# Patient Record
Sex: FEMALE | Race: BLACK OR AFRICAN AMERICAN | NOT HISPANIC OR LATINO | Employment: OTHER | URBAN - METROPOLITAN AREA
[De-identification: names, ages, dates, MRNs, and addresses within clinical notes are randomized per-mention and may not be internally consistent; named-entity substitution may affect disease eponyms.]

---

## 2017-02-06 ENCOUNTER — HOSPITAL ENCOUNTER (EMERGENCY)
Facility: MEDICAL CENTER | Age: 82
End: 2017-02-07
Attending: EMERGENCY MEDICINE
Payer: MEDICARE

## 2017-02-06 DIAGNOSIS — R55 NEAR SYNCOPE: ICD-10-CM

## 2017-02-06 LAB
ALBUMIN SERPL BCP-MCNC: 3.9 G/DL (ref 3.2–4.9)
ALBUMIN/GLOB SERPL: 1.5 G/DL
ALP SERPL-CCNC: 99 U/L (ref 30–99)
ALT SERPL-CCNC: 13 U/L (ref 2–50)
ANION GAP SERPL CALC-SCNC: 4 MMOL/L (ref 0–11.9)
APPEARANCE UR: ABNORMAL
APTT PPP: 28.2 SEC (ref 24.7–36)
AST SERPL-CCNC: 25 U/L (ref 12–45)
BACTERIA #/AREA URNS HPF: ABNORMAL /HPF
BASOPHILS # BLD AUTO: 0.6 % (ref 0–1.8)
BASOPHILS # BLD: 0.03 K/UL (ref 0–0.12)
BILIRUB SERPL-MCNC: 0.7 MG/DL (ref 0.1–1.5)
BILIRUB UR QL STRIP.AUTO: NEGATIVE
BUN SERPL-MCNC: 14 MG/DL (ref 8–22)
CALCIUM SERPL-MCNC: 9.6 MG/DL (ref 8.5–10.5)
CHLORIDE SERPL-SCNC: 97 MMOL/L (ref 96–112)
CO2 SERPL-SCNC: 34 MMOL/L (ref 20–33)
COLOR UR: YELLOW
CREAT SERPL-MCNC: 3.1 MG/DL (ref 0.5–1.4)
CULTURE IF INDICATED INDCX: YES UA CULTURE
EKG IMPRESSION: NORMAL
EOSINOPHIL # BLD AUTO: 0.06 K/UL (ref 0–0.51)
EOSINOPHIL NFR BLD: 1.1 % (ref 0–6.9)
EPI CELLS #/AREA URNS HPF: ABNORMAL /HPF
ERYTHROCYTE [DISTWIDTH] IN BLOOD BY AUTOMATED COUNT: 57.9 FL (ref 35.9–50)
GFR SERPL CREATININE-BSD FRML MDRD: 14 ML/MIN/1.73 M 2
GLOBULIN SER CALC-MCNC: 2.6 G/DL (ref 1.9–3.5)
GLUCOSE SERPL-MCNC: 86 MG/DL (ref 65–99)
GLUCOSE UR STRIP.AUTO-MCNC: NEGATIVE MG/DL
HCT VFR BLD AUTO: 32.5 % (ref 37–47)
HGB BLD-MCNC: 10.2 G/DL (ref 12–16)
IMM GRANULOCYTES # BLD AUTO: 0.02 K/UL (ref 0–0.11)
IMM GRANULOCYTES NFR BLD AUTO: 0.4 % (ref 0–0.9)
INR PPP: 1.12 (ref 0.87–1.13)
KETONES UR STRIP.AUTO-MCNC: NEGATIVE MG/DL
LEUKOCYTE ESTERASE UR QL STRIP.AUTO: ABNORMAL
LYMPHOCYTES # BLD AUTO: 1.05 K/UL (ref 1–4.8)
LYMPHOCYTES NFR BLD: 19.6 % (ref 22–41)
MCH RBC QN AUTO: 31.7 PG (ref 27–33)
MCHC RBC AUTO-ENTMCNC: 31.4 G/DL (ref 33.6–35)
MCV RBC AUTO: 100.9 FL (ref 81.4–97.8)
MICRO URNS: ABNORMAL
MONOCYTES # BLD AUTO: 0.64 K/UL (ref 0–0.85)
MONOCYTES NFR BLD AUTO: 12 % (ref 0–13.4)
MUCOUS THREADS #/AREA URNS HPF: ABNORMAL /HPF
NEUTROPHILS # BLD AUTO: 3.55 K/UL (ref 2–7.15)
NEUTROPHILS NFR BLD: 66.3 % (ref 44–72)
NITRITE UR QL STRIP.AUTO: NEGATIVE
NRBC # BLD AUTO: 0 K/UL
NRBC BLD AUTO-RTO: 0 /100 WBC
PH UR STRIP.AUTO: 8.5 [PH]
PLATELET # BLD AUTO: 107 K/UL (ref 164–446)
PMV BLD AUTO: 12 FL (ref 9–12.9)
POTASSIUM SERPL-SCNC: 4.9 MMOL/L (ref 3.6–5.5)
PROT SERPL-MCNC: 6.5 G/DL (ref 6–8.2)
PROT UR QL STRIP: 200 MG/DL
PROTHROMBIN TIME: 14.8 SEC (ref 12–14.6)
RBC # BLD AUTO: 3.22 M/UL (ref 4.2–5.4)
RBC # URNS HPF: ABNORMAL /HPF
RBC UR QL AUTO: ABNORMAL
SODIUM SERPL-SCNC: 135 MMOL/L (ref 135–145)
SP GR UR STRIP.AUTO: 1.01
TROPONIN I SERPL-MCNC: 0.04 NG/ML (ref 0–0.04)
WBC # BLD AUTO: 5.4 K/UL (ref 4.8–10.8)
WBC #/AREA URNS HPF: ABNORMAL /HPF

## 2017-02-06 PROCEDURE — 700111 HCHG RX REV CODE 636 W/ 250 OVERRIDE (IP): Performed by: EMERGENCY MEDICINE

## 2017-02-06 PROCEDURE — 36415 COLL VENOUS BLD VENIPUNCTURE: CPT

## 2017-02-06 PROCEDURE — 81001 URINALYSIS AUTO W/SCOPE: CPT

## 2017-02-06 PROCEDURE — 80053 COMPREHEN METABOLIC PANEL: CPT

## 2017-02-06 PROCEDURE — 93005 ELECTROCARDIOGRAM TRACING: CPT | Performed by: EMERGENCY MEDICINE

## 2017-02-06 PROCEDURE — 85025 COMPLETE CBC W/AUTO DIFF WBC: CPT

## 2017-02-06 PROCEDURE — 304562 HCHG STAT O2 MASK/CANNULA

## 2017-02-06 PROCEDURE — 85730 THROMBOPLASTIN TIME PARTIAL: CPT

## 2017-02-06 PROCEDURE — 85610 PROTHROMBIN TIME: CPT

## 2017-02-06 PROCEDURE — 87086 URINE CULTURE/COLONY COUNT: CPT

## 2017-02-06 PROCEDURE — 96365 THER/PROPH/DIAG IV INF INIT: CPT

## 2017-02-06 PROCEDURE — 99284 EMERGENCY DEPT VISIT MOD MDM: CPT

## 2017-02-06 PROCEDURE — 84484 ASSAY OF TROPONIN QUANT: CPT

## 2017-02-06 RX ORDER — CEFTRIAXONE 1 G/1
1 INJECTION, POWDER, FOR SOLUTION INTRAMUSCULAR; INTRAVENOUS ONCE
Status: COMPLETED | OUTPATIENT
Start: 2017-02-07 | End: 2017-02-06

## 2017-02-06 RX ORDER — CEFDINIR 300 MG/1
300 CAPSULE ORAL 2 TIMES DAILY
Qty: 20 CAP | Refills: 0 | Status: SHIPPED | OUTPATIENT
Start: 2017-02-06 | End: 2017-05-12

## 2017-02-06 RX ORDER — NITROFURANTOIN 25; 75 MG/1; MG/1
100 CAPSULE ORAL 2 TIMES DAILY
Qty: 14 CAP | Refills: 0 | Status: SHIPPED | OUTPATIENT
Start: 2017-02-06 | End: 2017-05-12

## 2017-02-06 RX ADMIN — CEFTRIAXONE 1 G: 1 INJECTION, POWDER, FOR SOLUTION INTRAMUSCULAR; INTRAVENOUS at 23:51

## 2017-02-06 ASSESSMENT — LIFESTYLE VARIABLES: DO YOU DRINK ALCOHOL: NO

## 2017-02-06 NOTE — ED AVS SNAPSHOT
Home Care Instructions                                                                                                                Skye James   MRN: [id number]    Department:  [facility], Emergency Dept   Date of Visit:  2/6/2017            [facility], Emergency Dept    [address] [place] [id number]    Phone:  [phone]      You were seen by     Angel Edge M.D.      Your Diagnosis Was     Near syncope     R55       These are the medications you received during your hospitalization from 02/06/2017 2026 to 02/07/2017 0029     Date/Time Order Dose Route Action    02/06/2017 2351 cefTRIAXone (ROCEPHIN) injection 1 g 1 g Intravenous Given      Follow-up Information     1. Follow up with [facility], Emergency Dept.    Specialty:  Emergency Medicine    Why:  for any further symptoms    Contact information    [address]  [facility] [address] [phone]        2. Follow up with Alistair Gandara M.D. In 2 days.    Specialty:  Internal Medicine    Contact information    [address] [place] [id number] [phone]        Medication Information     Review all of your home medications and newly ordered medications with your primary doctor and/or pharmacist as soon as possible. Follow medication instructions as directed by your doctor and/or pharmacist.     Please keep your complete medication list with you and share with your physician. Update the information when medications are discontinued, doses are changed, or new medications (including over-the-counter products) are added; and carry medication information at all times in the event of emergency situations.               Medication List      START taking these medications        Instructions    cefdinir 300 MG Caps   Commonly known as:  OMNICEF    Take 1 Cap by mouth 2 times a day.   Dose:  300 mg       nitrofurantoin monohydr macro 100 MG Caps   Commonly known  as:  MACROBID    Take 1 Cap by mouth 2 times a day.   Dose:  100 mg         ASK your doctor about these medications        Instructions    DIALYVITE 800 PO    Take  by mouth.       * losartan 25 MG Tabs   Commonly known as:  COZAAR    Take 25 mg by mouth every day.   Dose:  25 mg       * losartan 50 MG Tabs   Commonly known as:  COZAAR    Take 1 Tab by mouth every day.   Dose:  50 mg       metoprolol SR 25 MG Tb24   Commonly known as:  TOPROL XL    Take 25 mg by mouth every day.   Dose:  25 mg       SENSIPAR 90 MG Tabs   Generic drug:  Cinacalcet HCl        Sevelamer Carbonate 800 MG Tabs    Take 1 Tab by mouth 2 Times a Day.   Dose:  1 Tab       simvastatin 40 MG Tabs   Commonly known as:  ZOCOR    Take 40 mg by mouth every evening.   Dose:  40 mg       warfarin 4 MG Tabs   Commonly known as:  COUMADIN    Take 1 Tab by mouth every day.   Dose:  4 mg       * Notice:  This list has 2 medication(s) that are the same as other medications prescribed for you. Read the directions carefully, and ask your doctor or other care provider to review them with you.            Procedures and tests performed during your visit     CBC WITH DIFFERENTIAL    CMP    EKG (ER)    ESTIMATED GFR    PT/INR    PTT    TROPONIN    URINALYSIS,CULTURE IF INDICATED    URINE CULTURE(NEW)    URINE MICROSCOPIC (W/UA)        Discharge Instructions       Near-Syncope  Near-syncope (commonly known as near fainting) is sudden weakness, dizziness, or feeling like you might pass out. During an episode of near-syncope, you may also develop pale skin, have tunnel vision, or feel sick to your stomach (nauseous). Near-syncope may occur when getting up after sitting or while standing for a long time. It is caused by a sudden decrease in blood flow to the brain. This decrease can result from various causes or triggers, most of which are not serious. However, because near-syncope can sometimes be a sign of something serious, a medical evaluation is required. The  specific cause is often not determined.  HOME CARE INSTRUCTIONS   Monitor your condition for any changes. The following actions may help to alleviate any discomfort you are experiencing:  · Have someone stay with you until you feel stable.  · Lie down right away and prop your feet up if you start feeling like you might faint. Breathe deeply and steadily. Wait until all the symptoms have passed. Most of these episodes last only a few minutes. You may feel tired for several hours.    · Drink enough fluids to keep your urine clear or pale yellow.    · If you are taking blood pressure or heart medicine, get up slowly when seated or lying down. Take several minutes to sit and then stand. This can reduce dizziness.  · Follow up with your health care provider as directed.   SEEK IMMEDIATE MEDICAL CARE IF:   · You have a severe headache.    · You have unusual pain in the chest, abdomen, or back.    · You are bleeding from the mouth or rectum, or you have black or tarry stool.    · You have an irregular or very fast heartbeat.    · You have repeated fainting or have seizure-like jerking during an episode.    · You faint when sitting or lying down.    · You have confusion.    · You have difficulty walking.    · You have severe weakness.    · You have vision problems.    MAKE SURE YOU:   · Understand these instructions.  · Will watch your condition.  · Will get help right away if you are not doing well or get worse.     This information is not intended to replace advice given to you by your health care provider. Make sure you discuss any questions you have with your health care provider.     Document Released: 12/18/2006 Document Revised: 12/23/2014 Document Reviewed: 05/23/2014  Elsevier Interactive Patient Education ©2016 [employer]            Patient Information     Patient Information    Following emergency treatment: all patient requiring follow-up care must return either to a private physician or a clinic if your  condition worsens before you are able to obtain further medical attention, please return to the emergency room.     Billing Information    At [facility], we work to make the billing process streamlined for our patients.  Our Representatives are here to answer any questions you may have regarding your hospital bill.  If you have insurance coverage and have supplied your insurance information to us, we will submit a claim to your insurer on your behalf.  Should you have any questions regarding your bill, we can be reached online or by phone as follows:  Online: You are able pay your bills online or live chat with our representatives about any billing questions you may have. We are here to help Monday - Friday from 8:00am to 7:30pm and 9:00am - 12:00pm on Saturdays.  Please visit [email or web address]  for more information.   Phone:  [phone] or [phone]    Please note that your emergency physician, surgeon, pathologist, radiologist, anesthesiologist, and other specialists are not employed by [facility] and will therefore bill separately for their services.  Please contact them directly for any questions concerning their bills at the numbers below:     Emergency Physician Services:  [phone]  [facility]:  [phone]  [facility]:  [phone]  [facility]:  [phone]    1. Your final bill may vary from the amount quoted upon discharge if all procedures are not complete at that time, or if your doctor has additional procedures of which we are not aware. You will receive an additional bill if you return to the Emergency Department at [facility] for suture removal regardless of the facility of which the sutures were placed.     2. Please arrange for settlement of this account at the emergency registration.    3. All self-pay accounts are due in full at the time of treatment.  If you are unable to meet this obligation  then payment is expected within 4-5 days.     4. If you have had radiology studies (CT, X-ray, Ultrasound, MRI), you have received a preliminary result during your emergency department visit. Please contact the radiology department [phone] to receive a copy of your final result. Please discuss the Final result with your primary physician or with the follow up physician provided.     Crisis Hotline:  [place] Crisis Hotline:  1-406-MXSNGGJ or 1-315.162.8914  [place] Crisis Hotline:    1-777.924.1379 or 590-760-3294         ED Discharge Follow Up Questions    1. In order to provide you with very good care, we would like to follow up with a phone call in the next few days.  May we have your permission to contact you?     YES /  NO    2. What is the best phone number to call you? (       )_____-__________    3. What is the best time to call you?      Morning  /  Afternoon  /  Evening                   Patient Signature:  ____________________________________________________________    Date:  ____________________________________________________________      Your appointments     Jun 23, 2017  9:00 AM   Established Patient with Alistair Gandara M.D.   [facility]    [address] [id number] [phone]           You will be receiving a confirmation call a few days before your appointment from our automated call confirmation system.

## 2017-02-06 NOTE — ED AVS SNAPSHOT
2/7/2017          Skye James  [address]  [place] [address]    Dear Skye:    [facility] wants to ensure your discharge home is safe and you or your loved ones have had all your questions answered regarding your care after you leave the hospital.    You may receive a telephone call within two days of your discharge.  This call is to make certain you understand your discharge instructions as well as ensure we provided you with the best care possible during your stay with us.     The call will only last approximately 3-5 minutes and will be done by a nurse.    Once again, we want to ensure your discharge home is safe and that you have a clear understanding of any next steps in your care.  If you have any questions or concerns, please do not hesitate to contact us, we are here for you.  Thank you for choosing [facility] for your healthcare needs.    Sincerely,    Jorge Mckenzie    [facility]

## 2017-02-06 NOTE — ED AVS SNAPSHOT
[employer] ([employer]) Access Code: [email or web address]  Expires: 3/9/2017 12:28 AM    Your email address is not on file at [facility].  Email Addresses are required for you to sign up for [employer] ([employer]), please contact [phone] to verify your personal information and to provide your email address prior to attempting to register for [employer] ([employer]).    Skye James  [address] [place] [address]    [employer] ([employer])  A secure, online tool to manage your health information     [facility]’s [employer] ([employer])® is a secure, online tool that connects you to your personalized health information from the privacy of your home -- day or night - making it very easy for you to manage your healthcare. Once the activation process is completed, you can even access your medical information using the [employer] ([employer]) katheryn, which is available for free in the Apple Katheryn store or Google Play store.     To learn more about [employer] ([employer]), visit [email or web address]    There are two levels of access available (as shown below):   My Chart Features  [facility] Primary Care Doctor [facility]  Specialists [facility]  Urgent  Care Non-[facility] Primary Care Doctor   Email your healthcare team securely and privately 24/7 X X X    Manage appointments: schedule your next appointment; view details of past/upcoming appointments X      Request prescription refills. X      View recent personal medical records, including lab and immunizations X X X X   View health record, including health history, allergies, medications X X X X   Read reports about your outpatient visits, procedures, consult and ER notes X X X X   See your discharge summary, which is a recap of your hospital and/or ER visit that includes your diagnosis, lab results, and care plan X X  X     How to register for [employer]:  Once your e-mail address has been verified, follow the following steps to sign up for [employer] ([employer]).     1. Go to  [email or web address]  2. Click on the Sign Up Now box, which takes you to the New Member Sign Up page.  You will need to provide the following information:  a. Enter your [employer] Access Code exactly as it appears at the top of this page. (You will not need to use this code after you’ve completed the sign-up process. If you do not sign up before the expiration date, you must request a new code.)   b. Enter your date of birth.   c. Enter your home email address.   d. Click Submit, and follow the next screen’s instructions.  3. Create a D[employer] Pharm[employer]st ID. This will be your [employer] login ID and cannot be changed, so think of one that is secure and easy to remember.  4. Create a [employer] password. You can change your password at any time.  5. Enter your Password Reset Question and Answer. This can be used at a later time if you forget your password.   6. Enter your e-mail address. This allows you to receive e-mail notifications when new information is available in [employer].  7. Click Sign Up. You can now view your health information.    For assistance activating your [employer] account, call [phone]

## 2017-02-07 VITALS
HEART RATE: 65 BPM | SYSTOLIC BLOOD PRESSURE: 179 MMHG | TEMPERATURE: 99 F | DIASTOLIC BLOOD PRESSURE: 67 MMHG | HEIGHT: 62 IN | BODY MASS INDEX: 21.16 KG/M2 | WEIGHT: 115 LBS | RESPIRATION RATE: 20 BRPM | OXYGEN SATURATION: 90 %

## 2017-02-07 NOTE — ED NOTES
Pt's granddaughter (Ariana) called @ [phone] for ride. Per granddaughter, she will talk to her mother to come pick the pt.

## 2017-02-07 NOTE — DISCHARGE INSTRUCTIONS
Near-Syncope  Near-syncope (commonly known as near fainting) is sudden weakness, dizziness, or feeling like you might pass out. During an episode of near-syncope, you may also develop pale skin, have tunnel vision, or feel sick to your stomach (nauseous). Near-syncope may occur when getting up after sitting or while standing for a long time. It is caused by a sudden decrease in blood flow to the brain. This decrease can result from various causes or triggers, most of which are not serious. However, because near-syncope can sometimes be a sign of something serious, a medical evaluation is required. The specific cause is often not determined.  HOME CARE INSTRUCTIONS   Monitor your condition for any changes. The following actions may help to alleviate any discomfort you are experiencing:  · Have someone stay with you until you feel stable.  · Lie down right away and prop your feet up if you start feeling like you might faint. Breathe deeply and steadily. Wait until all the symptoms have passed. Most of these episodes last only a few minutes. You may feel tired for several hours.    · Drink enough fluids to keep your urine clear or pale yellow.    · If you are taking blood pressure or heart medicine, get up slowly when seated or lying down. Take several minutes to sit and then stand. This can reduce dizziness.  · Follow up with your health care provider as directed.   SEEK IMMEDIATE MEDICAL CARE IF:   · You have a severe headache.    · You have unusual pain in the chest, abdomen, or back.    · You are bleeding from the mouth or rectum, or you have black or tarry stool.    · You have an irregular or very fast heartbeat.    · You have repeated fainting or have seizure-like jerking during an episode.    · You faint when sitting or lying down.    · You have confusion.    · You have difficulty walking.    · You have severe weakness.    · You have vision problems.    MAKE SURE YOU:   · Understand these instructions.  · Will  watch your condition.  · Will get help right away if you are not doing well or get worse.     This information is not intended to replace advice given to you by your health care provider. Make sure you discuss any questions you have with your health care provider.     Document Released: 12/18/2006 Document Revised: 12/23/2014 Document Reviewed: 05/23/2014  [employer] Interactive Patient Education ©2016 Elsevier Inc.

## 2017-02-07 NOTE — ED PROVIDER NOTES
"ED Provider Note    CHIEF COMPLAINT  Chief Complaint   Patient presents with   • Near Syncopal     @ 1800. Per pt, pt had an episode of nause prior to the event. Pt denies SOB and CP. Per report, pt had dialysis earlier today        HPI  Skye James is a 81 y.o. female who presents to the emergency department after near syncopal episode. The patient states that she was at [facility] with her grandson when she was eating some chicken McNuggets and then felt extremely nauseated and felt like she was given a pass out. The patient did have an episode of emesis and then a near syncopal episode but she states that she did not lose consciousness. She did not have any focal weakness. She states periodically over the last couple weeks she's had several spells where she felt like she is going to collapse. Again she did not have any focal weakness. The patient does have dialysis-dependent renal failure and she goes to dialysis on Monday Wednesday and Friday and she has not missed an appointment. She has not had any fevers. She is not any change in bowel or bladder function. She states she does have a heart murmur due to a leaking valve but otherwise denies chest pain and states she had no difficulty breathing with the event tonight. She is on anticoagulants for history of a DVT.    REVIEW OF SYSTEMS  See HPI for further details. All other systems are negative.     PAST MEDICAL HISTORY  Past Medical History   Diagnosis Date   • GOUT    • other      \"thick blood\"   • Hypertension    • CKD (chronic kidney disease)    • Renal disorder    • Arrhythmia    • Heart murmur    • MEDICAL HOME 4/23/2013   • Arthritis      left knee   • DVT (deep venous thrombosis) (CMS-HCC)      history of DVT 3 times   • CATARACT      gabi surgery  completed   • Preoperative clearance 7/15/2013   • Unspecified hemorrhagic conditions (CMS-HCC)      on coumadin   • Dialysis      M, W, F in [place]   • Pneumonia 5/24/2014   • Other specified disorder " "of intestines      constipation   • ASTHMA      no meds necessary since moving to [place] from [place]   • Asthma      inhalers as needed   • Anemia    • Dental disorder      dentures   • Falls    • Anginal syndrome (CMS-HCC)    • Glaucoma    • Fall    • Indigestion    • Disorder of thyroid    • Chronic anticoagulation    • Moderate mitral regurgitation    • PAH (pulmonary artery hypertension) - liekly due to mitral regurgitation but with history of thromboembolic disease        SOCIAL HISTORY  Social History     Social History   • Marital Status:      Spouse Name: N/A   • Number of Children: N/A   • Years of Education: N/A     Social History Main Topics   • Smoking status: Never Smoker    • Smokeless tobacco: Never Used   • Alcohol Use: No   • Drug Use: No   • Sexual Activity: No     Other Topics Concern   • Not on file     Social History Narrative           PHYSICAL EXAM  VITAL SIGNS: /67 mmHg  Pulse 67  Temp(Src) 37.6 °C (99.7 °F)  Resp 16  Ht 1.575 m (5' 2\")  Wt 52.164 kg (115 lb)  BMI 21.03 kg/m2  SpO2 99%  LMP 01/01/1970  Constitutional: Chronically ill in appearance.   HENT: Normocephalic, Atraumatic, tympanic membranes are intact and nonerythematous bilaterally, Oropharynx moist without exudates or erythema, Nose normal.   Eyes: PERRLA, EOMI, Conjunctiva normal.  Neck: Supple without meningismus  Lymphatic: No lymphadenopathy noted.   Cardiovascular: Normal heart rate, Normal rhythm, systolic murmurs, No rubs, No gallops.   Thorax & Lungs: Normal breath sounds, No respiratory distress, No wheezing, No chest tenderness.   Abdomen: Bowel sounds normal, Soft, No tenderness, no rebound, no guarding, no distention, No masses, No pulsatile masses.   Skin: Warm, Dry, No erythema, No rash.   Back: No tenderness, No CVA tenderness.   Extremities: Atraumatic with symmetric distal pulses, No edema, No tenderness, No cyanosis, No clubbing.   Neurologic: Alert & oriented x 3, cranial nerves II through " XII are intact, Normal motor function, Normal sensory function, No focal deficits noted.   Psychiatric: Affect normal, Judgment normal, Mood normal.     EKG  Twelve-lead EKG interpreted by myself shows a normal sinus rhythm with a ventricular rate of 63, normal QRS, normal intervals, T waves inverted in V1 and V2, no ST segment elevation or depression.    COURSE & MEDICAL DECISION MAKING  Pertinent Labs & Imaging studies reviewed. (See chart for details)  This an 81-year-old female who presents emergency department after near syncopal episode. Her blood work appears stable. She did go to dialysis earlier today and they may have removed too much fluid and this compounded with her mitral regurgitation may have caused the near syncope. She's been in a sinus rhythm throughout her stay in the emergency department. She is neurologically intact. She does have evidence of a urinary tract infection but no evidence of urosepsis. She'll receive Macrobid therapy and we discussed inpatient observation versus discharge and she like to go home at this time. Therefore the patient be discharged instructions to follow up with primary care doctor in 48-72 hours for repeat examination and return for any further symptoms.    FINAL IMPRESSION  1. Near syncope  2. Urinary tract infection     Disposition  The patient will be discharged in stable condition.    Electronically signed by: Angel Edge, 2/6/2017 8:36 PM

## 2017-02-07 NOTE — ED NOTES
Patient given discharge teaching and education. Verbalizes understanding. Given chance to ask questions, all questions answered. Educated patient of signs and symptoms to returned to ER, and educated patient they can return for any concerning symptoms. One prescriptions provided to patient. Education given to patient about medications.Patient states they have their belongings. Denies additional needs at this time. Reports pain is well controlled. Patient monitored every hour and PRN for safety and comfort. Patient from [place]  via wheelchair.

## 2017-02-07 NOTE — ED NOTES
[place]. Pt given warm blankets. Pt updated on POC. All questions answered. Will continue to monitor.

## 2017-02-07 NOTE — ED NOTES
"Skye Oneal James  81 y.o. female  Chief Complaint   Patient presents with   • Near Syncopal     @ 1800. Per pt, pt had an episode of nause prior to the event. Pt denies SOB and CP. Per report, pt had dialysis earlier today      Pt BIB [facility] for above complaints. Per pt, pt was at [facility] when she started \"feeling sick and weak.\" Pt speaking in full sentences. A&OX4. Follows commands and responds appropriately to questions. ERP @ BS  "

## 2017-02-08 ENCOUNTER — TELEPHONE (OUTPATIENT)
Dept: MEDICAL GROUP | Facility: CLINIC | Age: 82
End: 2017-02-08

## 2017-02-08 LAB
BACTERIA UR CULT: NORMAL
SIGNIFICANT IND 70042: NORMAL
SITE SITE: NORMAL
SOURCE SOURCE: NORMAL

## 2017-02-08 NOTE — TELEPHONE ENCOUNTER
Patient was seen at the ER on 2/6/17 for kidney infection she was given Macrobid and Cefdinir, Patient would like to know if she can take this medications with the ones she already takes     Please Advice

## 2017-02-09 NOTE — TELEPHONE ENCOUNTER
Please inform patient continue with Macrobid for now, if no improvement then we'll switch the medication

## 2017-03-21 ENCOUNTER — HOSPITAL ENCOUNTER (EMERGENCY)
Facility: MEDICAL CENTER | Age: 82
End: 2017-03-21
Attending: EMERGENCY MEDICINE
Payer: MEDICARE

## 2017-03-21 ENCOUNTER — APPOINTMENT (OUTPATIENT)
Dept: RADIOLOGY | Facility: MEDICAL CENTER | Age: 82
End: 2017-03-21
Attending: EMERGENCY MEDICINE
Payer: MEDICARE

## 2017-03-21 VITALS
HEIGHT: 61 IN | RESPIRATION RATE: 16 BRPM | SYSTOLIC BLOOD PRESSURE: 169 MMHG | HEART RATE: 62 BPM | TEMPERATURE: 97.3 F | DIASTOLIC BLOOD PRESSURE: 43 MMHG | OXYGEN SATURATION: 94 %

## 2017-03-21 DIAGNOSIS — R10.9 ABDOMINAL CRAMPS: ICD-10-CM

## 2017-03-21 LAB
ALBUMIN SERPL BCP-MCNC: 3.8 G/DL (ref 3.2–4.9)
ALBUMIN/GLOB SERPL: 1.2 G/DL
ALP SERPL-CCNC: 140 U/L (ref 30–99)
ALT SERPL-CCNC: 14 U/L (ref 2–50)
ANION GAP SERPL CALC-SCNC: 10 MMOL/L (ref 0–11.9)
APTT PPP: 29.1 SEC (ref 24.7–36)
AST SERPL-CCNC: 28 U/L (ref 12–45)
BASOPHILS # BLD AUTO: 0.9 % (ref 0–1.8)
BASOPHILS # BLD: 0.03 K/UL (ref 0–0.12)
BILIRUB SERPL-MCNC: 0.9 MG/DL (ref 0.1–1.5)
BUN SERPL-MCNC: 21 MG/DL (ref 8–22)
CALCIUM SERPL-MCNC: 8.3 MG/DL (ref 8.5–10.5)
CHLORIDE SERPL-SCNC: 97 MMOL/L (ref 96–112)
CO2 SERPL-SCNC: 32 MMOL/L (ref 20–33)
CREAT SERPL-MCNC: 4.5 MG/DL (ref 0.5–1.4)
EOSINOPHIL # BLD AUTO: 0.1 K/UL (ref 0–0.51)
EOSINOPHIL NFR BLD: 3 % (ref 0–6.9)
ERYTHROCYTE [DISTWIDTH] IN BLOOD BY AUTOMATED COUNT: 56.5 FL (ref 35.9–50)
GFR SERPL CREATININE-BSD FRML MDRD: 9 ML/MIN/1.73 M 2
GLOBULIN SER CALC-MCNC: 3.3 G/DL (ref 1.9–3.5)
GLUCOSE SERPL-MCNC: 87 MG/DL (ref 65–99)
HCT VFR BLD AUTO: 33.5 % (ref 37–47)
HGB BLD-MCNC: 10.6 G/DL (ref 12–16)
IMM GRANULOCYTES # BLD AUTO: 0 K/UL (ref 0–0.11)
IMM GRANULOCYTES NFR BLD AUTO: 0 % (ref 0–0.9)
INR PPP: 1.08 (ref 0.87–1.13)
LIPASE SERPL-CCNC: 23 U/L (ref 11–82)
LYMPHOCYTES # BLD AUTO: 0.98 K/UL (ref 1–4.8)
LYMPHOCYTES NFR BLD: 29.3 % (ref 22–41)
MCH RBC QN AUTO: 31.5 PG (ref 27–33)
MCHC RBC AUTO-ENTMCNC: 31.6 G/DL (ref 33.6–35)
MCV RBC AUTO: 99.7 FL (ref 81.4–97.8)
MONOCYTES # BLD AUTO: 0.37 K/UL (ref 0–0.85)
MONOCYTES NFR BLD AUTO: 11 % (ref 0–13.4)
NEUTROPHILS # BLD AUTO: 1.87 K/UL (ref 2–7.15)
NEUTROPHILS NFR BLD: 55.8 % (ref 44–72)
NRBC # BLD AUTO: 0 K/UL
NRBC BLD AUTO-RTO: 0 /100 WBC
PLATELET # BLD AUTO: 118 K/UL (ref 164–446)
PMV BLD AUTO: 11.6 FL (ref 9–12.9)
POTASSIUM SERPL-SCNC: 4.5 MMOL/L (ref 3.6–5.5)
PROT SERPL-MCNC: 7.1 G/DL (ref 6–8.2)
PROTHROMBIN TIME: 14.3 SEC (ref 12–14.6)
RBC # BLD AUTO: 3.36 M/UL (ref 4.2–5.4)
SODIUM SERPL-SCNC: 139 MMOL/L (ref 135–145)
WBC # BLD AUTO: 3.4 K/UL (ref 4.8–10.8)

## 2017-03-21 PROCEDURE — 80053 COMPREHEN METABOLIC PANEL: CPT

## 2017-03-21 PROCEDURE — 74176 CT ABD & PELVIS W/O CONTRAST: CPT

## 2017-03-21 PROCEDURE — 85730 THROMBOPLASTIN TIME PARTIAL: CPT

## 2017-03-21 PROCEDURE — 99284 EMERGENCY DEPT VISIT MOD MDM: CPT

## 2017-03-21 PROCEDURE — 85610 PROTHROMBIN TIME: CPT

## 2017-03-21 PROCEDURE — 83690 ASSAY OF LIPASE: CPT

## 2017-03-21 PROCEDURE — 85025 COMPLETE CBC W/AUTO DIFF WBC: CPT

## 2017-03-21 NOTE — ED AVS SNAPSHOT
3/21/2017          Skye James  [address]  [place] [address]    Dear Skye:    [facility] wants to ensure your discharge home is safe and you or your loved ones have had all your questions answered regarding your care after you leave the hospital.    You may receive a telephone call within two days of your discharge.  This call is to make certain you understand your discharge instructions as well as ensure we provided you with the best care possible during your stay with us.     The call will only last approximately 3-5 minutes and will be done by a nurse.    Once again, we want to ensure your discharge home is safe and that you have a clear understanding of any next steps in your care.  If you have any questions or concerns, please do not hesitate to contact us, we are here for you.  Thank you for choosing [facility] for your healthcare needs.    Sincerely,    Jorge Mckenzie    [facility]

## 2017-03-21 NOTE — ED PROVIDER NOTES
"ED Provider Note    CHIEF COMPLAINT  Chief Complaint   Patient presents with   • Abdominal Cramps     pt reports 1 episode of loose stool this morning.        HPI  Skye James is a 81 y.o. female who presents for evaluation of crampy abdominal pain mild nausea. The patient has an extensive past medical history as listed below. She is on dialysis Monday Wednesday and Friday she went to her dialysis appointment yesterday. She's had some reported abdominal cramps, slightly right of midline. She denies any vomiting blood or hematochezia emesis or melena. She is apparently on Coumadin for history of blood clots. Denies any other symptoms such as chest pain diaphoresis fevers chills. Nothing seems to make it better or worse pain is nonradiating    REVIEW OF SYSTEMS  See HPI for further details. No lethargy cyanosis or apnea All other systems are negative.     PAST MEDICAL HISTORY  Past Medical History   Diagnosis Date   • GOUT    • other      \"thick blood\"   • Hypertension    • CKD (chronic kidney disease)    • Renal disorder    • Arrhythmia    • Heart murmur    • MEDICAL HOME 4/23/2013   • Arthritis      left knee   • DVT (deep venous thrombosis) (CMS-HCC)      history of DVT 3 times   • CATARACT      gabi surgery  completed   • Preoperative clearance 7/15/2013   • Unspecified hemorrhagic conditions (CMS-HCC)      on coumadin   • Dialysis      M, W, F in [place]   • Pneumonia 5/24/2014   • Other specified disorder of intestines      constipation   • ASTHMA      no meds necessary since moving to [place] from [place]   • Asthma      inhalers as needed   • Anemia    • Dental disorder      dentures   • Falls    • Anginal syndrome (CMS-HCC)    • Glaucoma    • Fall    • Indigestion    • Disorder of thyroid    • Chronic anticoagulation    • Moderate mitral regurgitation    • PAH (pulmonary artery hypertension) - liekly due to mitral regurgitation but with history of thromboembolic disease        FAMILY " HISTORY  Noncontributory    SOCIAL HISTORY  Social History     Social History   • Marital Status:      Spouse Name: N/A   • Number of Children: N/A   • Years of Education: N/A     Social History Main Topics   • Smoking status: Never Smoker    • Smokeless tobacco: Never Used   • Alcohol Use: No   • Drug Use: No   • Sexual Activity: No     Other Topics Concern   • Not on file     Social History Narrative     No IV drugs  SURGICAL HISTORY  Past Surgical History   Procedure Laterality Date   • Tonsillectomy     • Hysterectomy laparoscopy       L side   • Av fistula creation  6/22/2010     Performed by MICHA KIRBY at SURGERY [facility] ORS   • Recovery  4/3/2012     Performed by SURGERY, IR-RECOVERY at SURGERY SAME DAY [facility] ORS   • Cataract phaco with iol  6/20/2012     Performed by LEO RENDON at [facility]GERY [facility]S   • Recovery  11/27/2012     Performed by Ir-Recovery Surgery at SURGERY SAME DAY [facility] ORS   • Recovery  7/16/2013     Performed by Ir-Recovery Surgery at SURGERY SAME DAY [facility] ORS   • Recovery  8/5/2014     Performed by Ir-Recovery Surgery at SURGERY SAME DAY [facility] ORS       CURRENT MEDICATIONS  No current facility-administered medications for this encounter.    Current outpatient prescriptions:   •  nitrofurantoin monohydr macro (MACROBID) 100 MG Cap, Take 1 Cap by mouth 2 times a day., Disp: 14 Cap, Rfl: 0  •  cefdinir (OMNICEF) 300 MG Cap, Take 1 Cap by mouth 2 times a day., Disp: 20 Cap, Rfl: 0  •  losartan (COZAAR) 50 MG Tab, Take 1 Tab by mouth every day., Disp: 90 Tab, Rfl: 3  •  losartan (COZAAR) 25 MG Tab, Take 25 mg by mouth every day., Disp: , Rfl:   •  SENSIPAR 90 MG Tab, , Disp: , Rfl:   •  warfarin (COUMADIN) 4 MG Tab, Take 1 Tab by mouth every day., Disp: 90 Tab, Rfl: 3  •  metoprolol SR (TOPROL XL) 25 MG TABLET SR 24 HR, Take 25 mg by mouth every day., Disp: , Rfl:   •  B Complex-C-Folic Acid (DIALYVITE 800 PO), Take  by mouth., Disp: , Rfl:   •   "Sevelamer Carbonate 800 MG Tab, Take 1 Tab by mouth 2 Times a Day., Disp: , Rfl:   •  simvastatin (ZOCOR) 40 MG Tab, Take 40 mg by mouth every evening., Disp: , Rfl:       ALLERGIES  Allergies   Allergen Reactions   • Nkda [No Known Drug Allergy]        PHYSICAL EXAM  VITAL SIGNS: /43 mmHg  Pulse 62  Temp(Src) 36.3 °C (97.3 °F) (Temporal)  Resp 16  Ht 1.549 m (5' 1\")  SpO2 94%  LMP 01/01/1970      Constitutional: Well developed, Well nourished, No acute distress, Non-toxic appearance.   HENT: Normocephalic, Atraumatic, Bilateral external ears normal, Oropharynx moist, No oral exudates, Nose normal.   Eyes: PERRLA, EOMI, Conjunctiva normal, No discharge.   Neck: Normal range of motion, No tenderness, Supple, No stridor.   Cardiovascular: Normal heart rate, Normal rhythm, No murmurs, No rubs, No gallops.   Thorax & Lungs: Normal breath sounds, No respiratory distress, No wheezing, No chest tenderness.   Abdomen: Bowel sounds normal, Soft, minimal right mid and lower abdominal pain no rebound or guarding  Skin: Warm, Dry, No erythema, No rash.   Back: No tenderness, No CVA tenderness.   Extremities: Intact distal pulses, No edema, No tenderness, No cyanosis, No clubbing. Palpable thrill overlying the AV fistula in the right upper extremity  Neurologic: Alert & oriented x 3, Normal motor function, Normal sensory function, No focal deficits noted.   Psychiatric: Affect normal, Judgment normal, Mood normal.       COURSE & MEDICAL DECISION MAKING  Pertinent Labs & Imaging studies reviewed. (See chart for details)  Results for orders placed or performed during the hospital encounter of 03/21/17   CBC WITH DIFFERENTIAL   Result Value Ref Range    WBC 3.4 (L) 4.8 - 10.8 K/uL    RBC 3.36 (L) 4.20 - 5.40 M/uL    Hemoglobin 10.6 (L) 12.0 - 16.0 g/dL    Hematocrit 33.5 (L) 37.0 - 47.0 %    MCV 99.7 (H) 81.4 - 97.8 fL    MCH 31.5 27.0 - 33.0 pg    MCHC 31.6 (L) 33.6 - 35.0 g/dL    RDW 56.5 (H) 35.9 - 50.0 fL    " Platelet Count 118 (L) 164 - 446 K/uL    MPV 11.6 9.0 - 12.9 fL    Neutrophils-Polys 55.80 44.00 - 72.00 %    Lymphocytes 29.30 22.00 - 41.00 %    Monocytes 11.00 0.00 - 13.40 %    Eosinophils 3.00 0.00 - 6.90 %    Basophils 0.90 0.00 - 1.80 %    Immature Granulocytes 0.00 0.00 - 0.90 %    Nucleated RBC 0.00 /100 WBC    Neutrophils (Absolute) 1.87 (L) 2.00 - 7.15 K/uL    Lymphs (Absolute) 0.98 (L) 1.00 - 4.80 K/uL    Monos (Absolute) 0.37 0.00 - 0.85 K/uL    Eos (Absolute) 0.10 0.00 - 0.51 K/uL    Baso (Absolute) 0.03 0.00 - 0.12 K/uL    Immature Granulocytes (abs) 0.00 0.00 - 0.11 K/uL    NRBC (Absolute) 0.00 K/uL   COMP METABOLIC PANEL   Result Value Ref Range    Sodium 139 135 - 145 mmol/L    Potassium 4.5 3.6 - 5.5 mmol/L    Chloride 97 96 - 112 mmol/L    Co2 32 20 - 33 mmol/L    Anion Gap 10.0 0.0 - 11.9    Glucose 87 65 - 99 mg/dL    Bun 21 8 - 22 mg/dL    Creatinine 4.50 (H) 0.50 - 1.40 mg/dL    Calcium 8.3 (L) 8.5 - 10.5 mg/dL    AST(SGOT) 28 12 - 45 U/L    ALT(SGPT) 14 2 - 50 U/L    Alkaline Phosphatase 140 (H) 30 - 99 U/L    Total Bilirubin 0.9 0.1 - 1.5 mg/dL    Albumin 3.8 3.2 - 4.9 g/dL    Total Protein 7.1 6.0 - 8.2 g/dL    Globulin 3.3 1.9 - 3.5 g/dL    A-G Ratio 1.2 g/dL   LIPASE   Result Value Ref Range    Lipase 23 11 - 82 U/L   PROTHROMBIN TIME   Result Value Ref Range    PT 14.3 12.0 - 14.6 sec    INR 1.08 0.87 - 1.13   APTT   Result Value Ref Range    APTT 29.1 24.7 - 36.0 sec   ESTIMATED GFR   Result Value Ref Range    GFR If  11 (A) >60 mL/min/1.73 m 2    GFR If Non African American 9 (A) >60 mL/min/1.73 m 2      CT-ABDOMEN-PELVIS W/O   Final Result         1. Diffuse anasarca. No evidence of acute inflammatory change but study is very limited due to nonuse of intravenous contrast.      2. Colonic diverticula.      3. Patchy left lower lobe opacity, atelectasis versus consolidation.      4. Cardiomegaly.          The patient here has reassuring laboratory studies other than  chronic renal failure. No leukocytosis anemia or bandemia. CT scan without contrast was performed due to her ongoing ability to produce urine and did not want to risk additional nephrotoxicity. She does have evidence of anasarca but no obvious inflammatory or obstructive process. Here she has no evidence of peritonitis on clinical exam. I performed serial abdominal exams and there is no evidence of peritonitis and her pain spontaneously resolved. I counseled the patient on return precautions  FINAL IMPRESSION  1.   1. Abdominal cramps             Electronically signed by: Harsha Tate, 3/21/2017 10:44 AM

## 2017-03-21 NOTE — ED AVS SNAPSHOT
Home Care Instructions                                                                                                                Skye James   MRN: [id number]    Department:  [facility], Emergency Dept   Date of Visit:  3/21/2017            [facility], Emergency Dept    [address] [place] [id number]    Phone:  [phone]      You were seen by     Harsha Tate M.D.      Your Diagnosis Was     Abdominal cramps     R10.9       Follow-up Information     1. Follow up with [facility], Emergency Dept In 2 days.    Specialty:  Emergency Medicine    Why:  As needed, If symptoms worsen    Contact information    [address] [place] [id number] [phone]      Medication Information     Review all of your home medications and newly ordered medications with your primary doctor and/or pharmacist as soon as possible. Follow medication instructions as directed by your doctor and/or pharmacist.     Please keep your complete medication list with you and share with your physician. Update the information when medications are discontinued, doses are changed, or new medications (including over-the-counter products) are added; and carry medication information at all times in the event of emergency situations.               Medication List      ASK your doctor about these medications        Instructions    Morning Afternoon Evening Bedtime    cefdinir 300 MG Caps   Commonly known as:  OMNICEF        Take 1 Cap by mouth 2 times a day.   Dose:  300 mg                        DIALYVITE 800 PO        Take  by mouth.                        * losartan 25 MG Tabs   Commonly known as:  COZAAR        Take 25 mg by mouth every day.   Dose:  25 mg                        * losartan 50 MG Tabs   Commonly known as:  COZAAR        Take 1 Tab by mouth every day.   Dose:  50 mg                        metoprolol SR 25 MG Tb24   Commonly known as:  TOPROL XL           Take 25 mg by mouth every day.   Dose:  25 mg                        nitrofurantoin monohydr macro 100 MG Caps   Commonly known as:  MACROBID        Take 1 Cap by mouth 2 times a day.   Dose:  100 mg                        SENSIPAR 90 MG Tabs   Generic drug:  Cinacalcet HCl                             Sevelamer Carbonate 800 MG Tabs        Take 1 Tab by mouth 2 Times a Day.   Dose:  1 Tab                        simvastatin 40 MG Tabs   Commonly known as:  ZOCOR        Take 40 mg by mouth every evening.   Dose:  40 mg                        warfarin 4 MG Tabs   Commonly known as:  COUMADIN        Take 1 Tab by mouth every day.   Dose:  4 mg                        * Notice:  This list has 2 medication(s) that are the same as other medications prescribed for you. Read the directions carefully, and ask your doctor or other care provider to review them with you.            Procedures and tests performed during your visit     APTT    CBC WITH DIFFERENTIAL    COMP METABOLIC PANEL    CT-ABDOMEN-PELVIS W/O    ESTIMATED GFR    LIPASE    PROTHROMBIN TIME        Discharge Instructions       Abdominal Pain (Nonspecific)  Your exam might not show the exact reason you have abdominal pain. Since there are many different causes of abdominal pain, another checkup and more tests may be needed. It is very important to follow up for lasting (persistent) or worsening symptoms. A possible cause of abdominal pain in any person who still has his or her appendix is acute appendicitis. Appendicitis is often hard to diagnose. Normal blood tests, urine tests, ultrasound, and CT scans do not completely rule out early appendicitis or other causes of abdominal pain. Sometimes, only the changes that happen over time will allow appendicitis and other causes of abdominal pain to be determined. Other potential problems that may require surgery may also take time to become more apparent. Because of this, it is important that you follow all of the  instructions below.  HOME CARE INSTRUCTIONS   · Rest as much as possible.   · Do not eat solid food until your pain is gone.   · While adults or children have pain: A diet of water, weak decaffeinated tea, broth or bouillon, gelatin, oral rehydration solutions (ORS), frozen ice pops, or ice chips may be helpful.   · When pain is gone in adults or children: Start a light diet (dry toast, crackers, applesauce, or white rice). Increase the diet slowly as long as it does not bother you. Eat no dairy products (including cheese and eggs) and no spicy, fatty, fried, or high-fiber foods.   · Use no alcohol, caffeine, or cigarettes.   · Take your regular medicines unless your caregiver told you not to.   · Take any prescribed medicine as directed.   · Only take over-the-counter or prescription medicines for pain, discomfort, or fever as directed by your caregiver. Do not give aspirin to children.   If your caregiver has given you a follow-up appointment, it is very important to keep that appointment. Not keeping the appointment could result in a permanent injury and/or lasting (chronic) pain and/or disability. If there is any problem keeping the appointment, you must call to reschedule.   SEEK IMMEDIATE MEDICAL CARE IF:   · Your pain is not gone in 24 hours.   · Your pain becomes worse, changes location, or feels different.   · You or your child has an oral temperature above 102° F (38.9° C), not controlled by medicine.   · Your baby is older than 3 months with a rectal temperature of 102° F (38.9° C) or higher.   · Your baby is 3 months old or younger with a rectal temperature of 100.4° F (38° C) or higher.   · You have shaking chills.   · You keep throwing up (vomiting) or cannot drink liquids.   · There is blood in your vomit or you see blood in your bowel movements.   · Your bowel movements become dark or black.   · You have frequent bowel movements.   · Your bowel movements stop (become blocked) or you cannot pass gas.     · You have bloody, frequent, or painful urination.   · You have yellow discoloration in the skin or whites of the eyes.   · Your stomach becomes bloated or bigger.   · You have dizziness or fainting.   · You have chest or back pain.   MAKE SURE YOU:   · Understand these instructions.   · Will watch your condition.   · Will get help right away if you are not doing well or get worse.   Document Released: 12/18/2006 Document Revised: 03/11/2013 Document Reviewed: 11/15/2010  ExitCare® Patient Information ©2013 [employer].          Patient Information     Patient Information    Following emergency treatment: all patient requiring follow-up care must return either to a private physician or a clinic if your condition worsens before you are able to obtain further medical attention, please return to the emergency room.     Billing Information    [facility] we work to make the billing process streamlined for our patients.  Our Representatives are here to answer any questions you may have regarding your hospital bill.  If you have insurance coverage and have supplied your insurance information to us, we will submit a claim to your insurer on your behalf.  Should you have any questions regarding your bill, we can be reached online or by phone as follows:  Online: You are able pay your bills online or live chat with our representatives about any billing questions you may have. We are here to help Monday - Friday from 8:00am to 7:30pm and 9:00am - 12:00pm on Saturdays.  Please visit [email or web address]  for more information.   Phone:  [phone] or [phone]    Please note that your emergency physician, surgeon, pathologist, radiologist, anesthesiologist, and other specialists are not employed by [facility] and will therefore bill separately for their services.  Please contact them directly for any questions concerning their bills at the numbers below:     Emergency Physician  Services:  [phone]  [facility]:  [phone]  [facility]:  [phone]  [facility]:  [phone]    1. Your final bill may vary from the amount quoted upon discharge if all procedures are not complete at that time, or if your doctor has additional procedures of which we are not aware. You will receive an additional bill if you return to the Emergency Department at [facility] for suture removal regardless of the facility of which the sutures were placed.     2. Please arrange for settlement of this account at the emergency registration.    3. All self-pay accounts are due in full at the time of treatment.  If you are unable to meet this obligation then payment is expected within 4-5 days.     4. If you have had radiology studies (CT, X-ray, Ultrasound, MRI), you have received a preliminary result during your emergency department visit. Please contact the radiology department [phone] to receive a copy of your final result. Please discuss the Final result with your primary physician or with the follow up physician provided.     Crisis Hotline:  [place] Crisis Hotline:  9-300-IRVDQLK or 1-627.938.9921  [place] Crisis Hotline:    1-815.602.4031 or 066-941-3663         ED Discharge Follow Up Questions    1. In order to provide you with very good care, we would like to follow up with a phone call in the next few days.  May we have your permission to contact you?     YES /  NO    2. What is the best phone number to call you? (       )_____-__________    3. What is the best time to call you?      Morning  /  Afternoon  /  Evening                   Patient Signature:  ____________________________________________________________    Date:  ____________________________________________________________      Your appointments     Jun 23, 2017  9:00 AM   Established Patient with Alistair Gandara M.D.   [facility]    [address]  [facility] [address]   [phone]           You will be receiving a confirmation call a few days before your appointment from our automated call confirmation system.

## 2017-03-21 NOTE — ED AVS SNAPSHOT
Flyr Access Code: [email or web address]  Expires: 4/18/2017 10:52 AM    Your email address is not on file at [facility] [employer].  Email Addresses are required for you to sign up for Flyr, please contact [phone] to verify your personal information and to provide your email address prior to attempting to register for Flyr.    Skye James  [address] [place] [address]    Flyr  A secure, online tool to manage your health information     [employer]’s Flyr® is a secure, online tool that connects you to your personalized health information from the privacy of your home -- day or night - making it very easy for you to manage your healthcare. Once the activation process is completed, you can even access your medical information using the Flyr katheryn, which is available for free in the Apple Katheryn store or Google Play store.     To learn more about Flyr, visit [email or web address]    There are two levels of access available (as shown below):   My Chart Features  [facility] Primary Care Doctor [facility]  Specialists [facility]  Urgent  Care Non-[facility] Primary Care Doctor   Email your healthcare team securely and privately 24/7 X X X    Manage appointments: schedule your next appointment; view details of past/upcoming appointments X      Request prescription refills. X      View recent personal medical records, including lab and immunizations X X X X   View health record, including health history, allergies, medications X X X X   Read reports about your outpatient visits, procedures, consult and ER notes X X X X   See your discharge summary, which is a recap of your hospital and/or ER visit that includes your diagnosis, lab results, and care plan X X  X     How to register for imojit:  Once your e-mail address has been verified, follow the following steps to sign up for imojit.     1. Go to  [email or web address]  2. Click on the Sign Up Now box, which takes you to the New Member Sign Up page.  You will need to provide the following information:  a. Enter your [employer] Access Code exactly as it appears at the top of this page. (You will not need to use this code after you’ve completed the sign-up process. If you do not sign up before the expiration date, you must request a new code.)   b. Enter your date of birth.   c. Enter your home email address.   d. Click Submit, and follow the next screen’s instructions.  3. Create a [employer] ID. This will be your [employer] login ID and cannot be changed, so think of one that is secure and easy to remember.  4. Create a [employer] password. You can change your password at any time.  5. Enter your Password Reset Question and Answer. This can be used at a later time if you forget your password.   6. Enter your e-mail address. This allows you to receive e-mail notifications when new information is available in [employer].  7. Click Sign Up. You can now view your health information.    For assistance activating your [employer] account, call [phone]

## 2017-03-21 NOTE — ED NOTES
"Chief Complaint   Patient presents with   • Abdominal Cramps     pt reports 1 episode of loose stool this morning.      Pt reports generally not feeling well, on dialysis, last dialysis yesterday.  Blood pressure 169/43, pulse 62, temperature 36.3 °C (97.3 °F), temperature source Temporal, resp. rate 16, height 1.549 m (5' 1\"), last menstrual period 01/01/1970, SpO2 94 %.  Pt informed of wait times. Educated on triage process.  Asked to return to triage RN for any new or worsening of symptoms. Thanked for patience.        "

## 2017-03-21 NOTE — DISCHARGE INSTRUCTIONS
Abdominal Pain (Nonspecific)  Your exam might not show the exact reason you have abdominal pain. Since there are many different causes of abdominal pain, another checkup and more tests may be needed. It is very important to follow up for lasting (persistent) or worsening symptoms. A possible cause of abdominal pain in any person who still has his or her appendix is acute appendicitis. Appendicitis is often hard to diagnose. Normal blood tests, urine tests, ultrasound, and CT scans do not completely rule out early appendicitis or other causes of abdominal pain. Sometimes, only the changes that happen over time will allow appendicitis and other causes of abdominal pain to be determined. Other potential problems that may require surgery may also take time to become more apparent. Because of this, it is important that you follow all of the instructions below.  HOME CARE INSTRUCTIONS   · Rest as much as possible.   · Do not eat solid food until your pain is gone.   · While adults or children have pain: A diet of water, weak decaffeinated tea, broth or bouillon, gelatin, oral rehydration solutions (ORS), frozen ice pops, or ice chips may be helpful.   · When pain is gone in adults or children: Start a light diet (dry toast, crackers, applesauce, or white rice). Increase the diet slowly as long as it does not bother you. Eat no dairy products (including cheese and eggs) and no spicy, fatty, fried, or high-fiber foods.   · Use no alcohol, caffeine, or cigarettes.   · Take your regular medicines unless your caregiver told you not to.   · Take any prescribed medicine as directed.   · Only take over-the-counter or prescription medicines for pain, discomfort, or fever as directed by your caregiver. Do not give aspirin to children.   If your caregiver has given you a follow-up appointment, it is very important to keep that appointment. Not keeping the appointment could result in a permanent injury and/or lasting (chronic) pain  and/or disability. If there is any problem keeping the appointment, you must call to reschedule.   SEEK IMMEDIATE MEDICAL CARE IF:   · Your pain is not gone in 24 hours.   · Your pain becomes worse, changes location, or feels different.   · You or your child has an oral temperature above 102° F (38.9° C), not controlled by medicine.   · Your baby is older than 3 months with a rectal temperature of 102° F (38.9° C) or higher.   · Your baby is 3 months old or younger with a rectal temperature of 100.4° F (38° C) or higher.   · You have shaking chills.   · You keep throwing up (vomiting) or cannot drink liquids.   · There is blood in your vomit or you see blood in your bowel movements.   · Your bowel movements become dark or black.   · You have frequent bowel movements.   · Your bowel movements stop (become blocked) or you cannot pass gas.   · You have bloody, frequent, or painful urination.   · You have yellow discoloration in the skin or whites of the eyes.   · Your stomach becomes bloated or bigger.   · You have dizziness or fainting.   · You have chest or back pain.   MAKE SURE YOU:   · Understand these instructions.   · Will watch your condition.   · Will get help right away if you are not doing well or get worse.   Document Released: 12/18/2006 Document Revised: 03/11/2013 Document Reviewed: 11/15/2010  ExitCare® Patient Information ©2013 [employer].

## 2017-04-16 ENCOUNTER — HOSPITAL ENCOUNTER (EMERGENCY)
Facility: MEDICAL CENTER | Age: 82
End: 2017-04-17
Attending: EMERGENCY MEDICINE
Payer: MEDICARE

## 2017-04-16 VITALS
BODY MASS INDEX: 19.72 KG/M2 | SYSTOLIC BLOOD PRESSURE: 149 MMHG | RESPIRATION RATE: 22 BRPM | OXYGEN SATURATION: 91 % | TEMPERATURE: 97.8 F | DIASTOLIC BLOOD PRESSURE: 69 MMHG | WEIGHT: 107.14 LBS | HEART RATE: 56 BPM | HEIGHT: 62 IN

## 2017-04-16 DIAGNOSIS — Z87.440 HISTORY OF URINARY TRACT INFECTION: ICD-10-CM

## 2017-04-16 LAB
APPEARANCE UR: ABNORMAL
BACTERIA #/AREA URNS HPF: ABNORMAL /HPF
BILIRUB UR QL CFM: NEGATIVE
COLOR UR: YELLOW
CULTURE IF INDICATED INDCX: YES UA CULTURE
EPI CELLS #/AREA URNS HPF: ABNORMAL /HPF
GLUCOSE UR STRIP.AUTO-MCNC: NEGATIVE MG/DL
KETONES UR STRIP.AUTO-MCNC: ABNORMAL MG/DL
LEUKOCYTE ESTERASE UR QL STRIP.AUTO: NEGATIVE
MICRO URNS: ABNORMAL
MUCOUS THREADS #/AREA URNS HPF: ABNORMAL /HPF
NITRITE UR QL STRIP.AUTO: NEGATIVE
PH UR STRIP.AUTO: 5.5 [PH]
PROT UR QL STRIP: 100 MG/DL
RBC # URNS HPF: ABNORMAL /HPF
RBC UR QL AUTO: ABNORMAL
SP GR UR STRIP.AUTO: 1.02
UNIDENT CRYS URNS QL MICRO: ABNORMAL /HPF
WBC #/AREA URNS HPF: ABNORMAL /HPF
YEAST #/AREA URNS HPF: ABNORMAL /HPF

## 2017-04-16 PROCEDURE — 99283 EMERGENCY DEPT VISIT LOW MDM: CPT

## 2017-04-16 PROCEDURE — 51701 INSERT BLADDER CATHETER: CPT

## 2017-04-16 PROCEDURE — 87086 URINE CULTURE/COLONY COUNT: CPT

## 2017-04-16 PROCEDURE — 81001 URINALYSIS AUTO W/SCOPE: CPT

## 2017-04-16 ASSESSMENT — PAIN SCALES - GENERAL: PAINLEVEL_OUTOF10: 0

## 2017-04-16 NOTE — ED AVS SNAPSHOT
4/17/2017    Skye James  [address]  [place] [address]    Dear Skye:    [facility] wants to ensure your discharge home is safe and you or your loved ones have had all of your questions answered regarding your care after you leave the hospital.    Below is a list of resources and contact information should you have any questions regarding your hospital stay, follow-up instructions, or active medical symptoms.    Questions or Concerns Regarding… Contact   Medical Questions Related to Your Discharge  (7 days a week, 8am-5pm) Contact a Nurse Care Coordinator   [phone]   Medical Questions Not Related to Your Discharge  (24 hours a day / 7 days a week)  Contact the Nurse Health Line   [phone]    Medications or Discharge Instructions Refer to your discharge packet   or contact your [facility] Primary Care Provider   [phone]   Follow-up Appointment(s) Schedule your appointment via Adonit   or contact Scheduling [phone]   Billing Review your statement via Adonit  or contact Billing [phone]   Medical Records Review your records via Adonit   or contact Medical Records [phone]     You may receive a telephone call within two days of discharge. This call is to make certain you understand your discharge instructions and have the opportunity to have any questions answered. You can also easily access your medical information, test results and upcoming appointments via the Adonit free online health management tool. You can learn more and sign up at [email or web address]. For assistance setting up your Adonit account, please call [phone].    Once again, we want to ensure your discharge home is safe and that you have a clear understanding of any next steps in your care. If you have any questions or concerns, please do not hesitate to contact us, we are here for you. Thank you for choosing [facility] for your healthcare needs.    Sincerely,    Your [facility] Healthcare Team

## 2017-04-16 NOTE — ED AVS SNAPSHOT
Home Care Instructions                                                                                                                Skye James   MRN: [id number]    Department:  [facility], Emergency Dept   Date of Visit:  4/16/2017            [facility], Emergency Dept    [address]    [place] [address]    Phone:  [phone]      You were seen by     1. Steven Green M.D.    2. Cathryn Steele M.D.      Your Diagnosis Was     History of urinary tract infection     Z87.440       Follow-up Information     1. Follow up with Alistair Gandara M.D..    Specialty:  Internal Medicine    Contact information    [address] [place] [address] [phone]        Medication Information     Review all of your home medications and newly ordered medications with your primary doctor and/or pharmacist as soon as possible. Follow medication instructions as directed by your doctor and/or pharmacist.     Please keep your complete medication list with you and share with your physician. Update the information when medications are discontinued, doses are changed, or new medications (including over-the-counter products) are added; and carry medication information at all times in the event of emergency situations.               Medication List      ASK your doctor about these medications        Instructions    Morning Afternoon Evening Bedtime    cefdinir 300 MG Caps   Commonly known as:  OMNICEF        Take 1 Cap by mouth 2 times a day.   Dose:  300 mg                        DIALYVITE 800 PO        Take  by mouth.                        * losartan 25 MG Tabs   Commonly known as:  COZAAR        Take 25 mg by mouth every day.   Dose:  25 mg                        * losartan 50 MG Tabs   Commonly known as:  COZAAR        Take 1 Tab by mouth every day.   Dose:  50 mg                        metoprolol SR 25 MG Tb24   Commonly known as:  TOPROL XL       Take 25 mg by mouth every day.   Dose:  25 mg                        nitrofurantoin monohydr macro 100 MG Caps   Commonly known as:  MACROBID        Take 1 Cap by mouth 2 times a day.   Dose:  100 mg                        SENSIPAR 90 MG Tabs   Generic drug:  Cinacalcet HCl                             Sevelamer Carbonate 800 MG Tabs        Take 1 Tab by mouth 2 Times a Day.   Dose:  1 Tab                        simvastatin 40 MG Tabs   Commonly known as:  ZOCOR        Take 40 mg by mouth every evening.   Dose:  40 mg                        warfarin 4 MG Tabs   Commonly known as:  COUMADIN        Take 1 Tab by mouth every day.   Dose:  4 mg                        * Notice:  This list has 2 medication(s) that are the same as other medications prescribed for you. Read the directions carefully, and ask your doctor or other care provider to review them with you.            Procedures and tests performed during your visit     INSERTION CATH MINI    UR BILI ICTOTEST    URINALYSIS CULTURE, IF INDICATED    URINE MICROSCOPIC (W/UA)        Discharge Instructions       Urinary Tract Infection  Urinary tract infections (UTIs) can develop anywhere along your urinary tract. Your urinary tract is your body's drainage system for removing wastes and extra water. Your urinary tract includes two kidneys, two ureters, a bladder, and a urethra. Your kidneys are a pair of bean-shaped organs. Each kidney is about the size of your fist. They are located below your ribs, one on each side of your spine.  CAUSES  Infections are caused by microbes, which are microscopic organisms, including fungi, viruses, and bacteria. These organisms are so small that they can only be seen through a microscope. Bacteria are the microbes that most commonly cause UTIs.  SYMPTOMS   Symptoms of UTIs may vary by age and gender of the patient and by the location of the infection. Symptoms in young women typically include a frequent and intense urge to urinate  and a painful, burning feeling in the bladder or urethra during urination. Older women and men are more likely to be tired, shaky, and weak and have muscle aches and abdominal pain. A fever may mean the infection is in your kidneys. Other symptoms of a kidney infection include pain in your back or sides below the ribs, nausea, and vomiting.  DIAGNOSIS  To diagnose a UTI, your caregiver will ask you about your symptoms. Your caregiver also will ask to provide a urine sample. The urine sample will be tested for bacteria and white blood cells. White blood cells are made by your body to help fight infection.  TREATMENT   Typically, UTIs can be treated with medication. Because most UTIs are caused by a bacterial infection, they usually can be treated with the use of antibiotics. The choice of antibiotic and length of treatment depend on your symptoms and the type of bacteria causing your infection.  HOME CARE INSTRUCTIONS  · If you were prescribed antibiotics, take them exactly as your caregiver instructs you. Finish the medication even if you feel better after you have only taken some of the medication.  · Drink enough water and fluids to keep your urine clear or pale yellow.  · Avoid caffeine, tea, and carbonated beverages. They tend to irritate your bladder.  · Empty your bladder often. Avoid holding urine for long periods of time.  · Empty your bladder before and after sexual intercourse.  · After a bowel movement, women should cleanse from front to back. Use each tissue only once.  SEEK MEDICAL CARE IF:   · You have back pain.  · You develop a fever.  · Your symptoms do not begin to resolve within 3 days.  SEEK IMMEDIATE MEDICAL CARE IF:   · You have severe back pain or lower abdominal pain.  · You develop chills.  · You have nausea or vomiting.  · You have continued burning or discomfort with urination.  MAKE SURE YOU:   · Understand these instructions.  · Will watch your condition.  · Will get help right away if  you are not doing well or get worse.     This information is not intended to replace advice given to you by your health care provider. Make sure you discuss any questions you have with your health care provider.     Document Released: 09/27/2006 Document Revised: 01/08/2016 Document Reviewed: 01/25/2013  [employer] Interactive Patient Education ©2016 [employer]            Patient Information     Patient Information    Following emergency treatment: all patient requiring follow-up care must return either to a private physician or a clinic if your condition worsens before you are able to obtain further medical attention, please return to the emergency room.     Billing Information    At [facility], we work to make the billing process streamlined for our patients.  Our Representatives are here to answer any questions you may have regarding your hospital bill.  If you have insurance coverage and have supplied your insurance information to us, we will submit a claim to your insurer on your behalf.  Should you have any questions regarding your bill, we can be reached online or by phone as follows:  Online: You are able pay your bills online or live chat with our representatives about any billing questions you may have. We are here to help Monday - Friday from 8:00am to 7:30pm and 9:00am - 12:00pm on Saturdays.  Please visit [email or web address]  for more information.   Phone:  [phone] or [phone]    Please note that your emergency physician, surgeon, pathologist, radiologist, anesthesiologist, and other specialists are not employed by [facility] and will therefore bill separately for their services.  Please contact them directly for any questions concerning their bills at the numbers below:     Emergency Physician Services:  [phone]  [facility]:  [phone]  [facility]:  [phone]  [facility]:  [phone]    1. Your  final bill may vary from the amount quoted upon discharge if all procedures are not complete at that time, or if your doctor has additional procedures of which we are not aware. You will receive an additional bill if you return to the Emergency Department at [facility] for suture removal regardless of the facility of which the sutures were placed.     2. Please arrange for settlement of this account at the emergency registration.    3. All self-pay accounts are due in full at the time of treatment.  If you are unable to meet this obligation then payment is expected within 4-5 days.     4. If you have had radiology studies (CT, X-ray, Ultrasound, MRI), you have received a preliminary result during your emergency department visit. Please contact the radiology department [phone] to receive a copy of your final result. Please discuss the Final result with your primary physician or with the follow up physician provided.     Crisis Hotline:  [place] Crisis Hotline:  4-724-RGGLHON or 1-920.438.6898  [place] Crisis Hotline:    1-398.296.8203 or 766-308-3222         ED Discharge Follow Up Questions    1. In order to provide you with very good care, we would like to follow up with a phone call in the next few days.  May we have your permission to contact you?     YES /  NO    2. What is the best phone number to call you? (       )_____-__________    3. What is the best time to call you?      Morning  /  Afternoon  /  Evening                   Patient Signature:  ____________________________________________________________    Date:  ____________________________________________________________      Your appointments     Jun 23, 2017  9:00 AM   Established Patient with Alistair Gandara M.D.   [facility]    [address] [id number] [phone]           You will be receiving a confirmation call a few days before your appointment from our automated call  confirmation system.

## 2017-04-16 NOTE — ED AVS SNAPSHOT
LeadPoint Access Code: [email or web address]  Expires: 4/18/2017 10:52 AM    Your email address is not on file at [facility].  Email Addresses are required for you to sign up for LeadPoint, please contact [phone] to verify your personal information and to provide your email address prior to attempting to register for LeadPoint.    Skye James  [address] [place] [address]    LeadPoint  A secure, online tool to manage your health information     [facility]’s LeadPoint® is a secure, online tool that connects you to your personalized health information from the privacy of your home -- day or night - making it very easy for you to manage your healthcare. Once the activation process is completed, you can even access your medical information using the LeadPoint katheryn, which is available for free in the Apple Katheryn store or Google Play store.     To learn more about LeadPoint, visit [email or web address]    There are two levels of access available (as shown below):   My Chart Features  [facility] Primary Care Doctor [facility]  Specialists [facility]  Urgent  Care Non-[facility] Primary Care Doctor   Email your healthcare team securely and privately 24/7 X X X    Manage appointments: schedule your next appointment; view details of past/upcoming appointments X      Request prescription refills. X      View recent personal medical records, including lab and immunizations X X X X   View health record, including health history, allergies, medications X X X X   Read reports about your outpatient visits, procedures, consult and ER notes X X X X   See your discharge summary, which is a recap of your hospital and/or ER visit that includes your diagnosis, lab results, and care plan X X  X     How to register for Xubat:  Once your e-mail address has been verified, follow the following steps to sign up for Xubat.     1. Go to  [email or web address]  2. Click on the Sign Up Now box, which takes you to the New Member Sign Up page.  You will need to provide the following information:  a. Enter your [employer] K[employer] Access Code exactly as it appears at the top of this page. (You will not need to use this code after you’ve completed the sign-up process. If you do not sign up before the expiration date, you must request a new code.)   b. Enter your date of birth.   c. Enter your home email address.   d. Click Submit, and follow the next screen’s instructions.  3. Create a i2O Watert ID. This will be your [employer] K[employer] login ID and cannot be changed, so think of one that is secure and easy to remember.  4. Create a [employer] K[employer] password. You can change your password at any time.  5. Enter your Password Reset Question and Answer. This can be used at a later time if you forget your password.   6. Enter your e-mail address. This allows you to receive e-mail notifications when new information is available in [employer] K[employer]  7. Click Sign Up. You can now view your health information.    For assistance activating your [employer] account, call [phone]

## 2017-04-17 NOTE — DISCHARGE INSTRUCTIONS
Urinary Tract Infection  Urinary tract infections (UTIs) can develop anywhere along your urinary tract. Your urinary tract is your body's drainage system for removing wastes and extra water. Your urinary tract includes two kidneys, two ureters, a bladder, and a urethra. Your kidneys are a pair of bean-shaped organs. Each kidney is about the size of your fist. They are located below your ribs, one on each side of your spine.  CAUSES  Infections are caused by microbes, which are microscopic organisms, including fungi, viruses, and bacteria. These organisms are so small that they can only be seen through a microscope. Bacteria are the microbes that most commonly cause UTIs.  SYMPTOMS   Symptoms of UTIs may vary by age and gender of the patient and by the location of the infection. Symptoms in young women typically include a frequent and intense urge to urinate and a painful, burning feeling in the bladder or urethra during urination. Older women and men are more likely to be tired, shaky, and weak and have muscle aches and abdominal pain. A fever may mean the infection is in your kidneys. Other symptoms of a kidney infection include pain in your back or sides below the ribs, nausea, and vomiting.  DIAGNOSIS  To diagnose a UTI, your caregiver will ask you about your symptoms. Your caregiver also will ask to provide a urine sample. The urine sample will be tested for bacteria and white blood cells. White blood cells are made by your body to help fight infection.  TREATMENT   Typically, UTIs can be treated with medication. Because most UTIs are caused by a bacterial infection, they usually can be treated with the use of antibiotics. The choice of antibiotic and length of treatment depend on your symptoms and the type of bacteria causing your infection.  HOME CARE INSTRUCTIONS  · If you were prescribed antibiotics, take them exactly as your caregiver instructs you. Finish the medication even if you feel better after you  have only taken some of the medication.  · Drink enough water and fluids to keep your urine clear or pale yellow.  · Avoid caffeine, tea, and carbonated beverages. They tend to irritate your bladder.  · Empty your bladder often. Avoid holding urine for long periods of time.  · Empty your bladder before and after sexual intercourse.  · After a bowel movement, women should cleanse from front to back. Use each tissue only once.  SEEK MEDICAL CARE IF:   · You have back pain.  · You develop a fever.  · Your symptoms do not begin to resolve within 3 days.  SEEK IMMEDIATE MEDICAL CARE IF:   · You have severe back pain or lower abdominal pain.  · You develop chills.  · You have nausea or vomiting.  · You have continued burning or discomfort with urination.  MAKE SURE YOU:   · Understand these instructions.  · Will watch your condition.  · Will get help right away if you are not doing well or get worse.     This information is not intended to replace advice given to you by your health care provider. Make sure you discuss any questions you have with your health care provider.     Document Released: 09/27/2006 Document Revised: 01/08/2016 Document Reviewed: 01/25/2013  [employer] Interactive Patient Education ©2016 [employer]

## 2017-04-17 NOTE — ED NOTES
"Chief Complaint   Patient presents with   • Urinary Retention     pt a dialysis pt MWF and state in the last week she has the urge but \"nothing comes out.\"   /69 mmHg  Pulse 69  Temp(Src) 36.6 °C (97.8 °F)  Resp 22  Ht 1.575 m (5' 2\")  Wt 48.6 kg (107 lb 2.3 oz)  BMI 19.59 kg/m2  LMP 01/01/1970    Pt amb to triage with above complaint. Pt states dialysis pt for approx 5 years. Pt states 2 weeks ago seen at emergency and was told she has a bladder infection.  "

## 2017-04-17 NOTE — ED PROVIDER NOTES
"ED Provider Note    CHIEF COMPLAINT  Chief Complaint   Patient presents with   • Urinary Retention     pt a dialysis pt MWF and state in the last week she has the urge but \"nothing comes out.\"       HPI  Skye James is a 81 y.o. female who presents with history of bladder infection. She is on dialysis and does make some urine she was told she had a bladder infection 2 weeks ago she is here to recheck to see if she has a bladder infection still. She states she has a urinary in retention. She does not make it much urine. She gets dialysis Monday Wednesday Friday. She has no fever chills nausea vomiting abdominal pain chest pain or shortness of breath all other systems negative    REVIEW OF SYSTEMS  See HPI for further details    PAST MEDICAL HISTORY  Past Medical History   Diagnosis Date   • GOUT    • other      \"thick blood\"   • Hypertension    • CKD (chronic kidney disease)    • Renal disorder    • Arrhythmia    • Heart murmur    • MEDICAL HOME 4/23/2013   • Arthritis      left knee   • DVT (deep venous thrombosis) (CMS-HCC)      history of DVT 3 times   • CATARACT      gabi surgery  completed   • Preoperative clearance 7/15/2013   • Unspecified hemorrhagic conditions (CMS-HCC)      on coumadin   • Dialysis      M, W, F in [place]   • Pneumonia 5/24/2014   • Other specified disorder of intestines      constipation   • ASTHMA      no meds necessary since moving to [place] from [place]   • Asthma      inhalers as needed   • Anemia    • Dental disorder      dentures   • Falls    • Anginal syndrome (CMS-HCC)    • Glaucoma    • Fall    • Indigestion    • Disorder of thyroid    • Chronic anticoagulation    • Moderate mitral regurgitation    • PAH (pulmonary artery hypertension) - liekly due to mitral regurgitation but with history of thromboembolic disease        FAMILY HISTORY  Family History   Problem Relation Age of Onset   • Hypertension Mother        SOCIAL HISTORY  Social History     Social History   • Marital " "Status:      Spouse Name: N/A   • Number of Children: N/A   • Years of Education: N/A     Social History Main Topics   • Smoking status: Never Smoker    • Smokeless tobacco: Never Used   • Alcohol Use: No   • Drug Use: No   • Sexual Activity: No     Other Topics Concern   • None     Social History Narrative       SURGICAL HISTORY  Past Surgical History   Procedure Laterality Date   • Tonsillectomy     • Hysterectomy laparoscopy       L side   • Av fistula creation  6/22/2010     Performed by MICHA KIRBY at SURGERY [facility] ORS   • Recovery  4/3/2012     [facility]   • Cataract phaco with iol  6/20/2012     Performed by LEO RENDON at [facility]   • Recovery  11/27/2012     Performed by Ir-Recovery Surgery at [facility]   • Recovery  7/16/2013     [facility]   • Recovery  8/5/2014     Performed by Ir-Recovery Surgery at [facility]       CURRENT MEDICATIONS  Home Medications     **Home medications have not yet been reviewed for this encounter**          ALLERGIES  Allergies   Allergen Reactions   • Nkda [No Known Drug Allergy]        PHYSICAL EXAM  VITAL SIGNS: /69 mmHg  Pulse 69  Temp(Src) 36.6 °C (97.8 °F)  Resp 22  Ht 1.575 m (5' 2\")  Wt 48.6 kg (107 lb 2.3 oz)  BMI 19.59 kg/m2  LMP 01/01/1970    Constitutional: Patient is alert and oriented x3 in no distress   HENT: Moist mucous membranes  Eyes: No conjunctivitis or icterus  Cardiovascular: Normal heart rate   Thorax & Lungs: Clear to auscultation  Abdomen: Nontender  Back: No CVA tenderness  Psychiatric: Affect normal, Judgment normal, Mood normal.     Results for orders placed or performed during the hospital encounter of 04/16/17   URINALYSIS CULTURE, IF INDICATED   Result Value Ref Range    Micro Urine Req Microscopic         COURSE & MEDICAL DECISION MAKING  Pertinent " Labs & Imaging studies reviewed. (See chart for details)  I did review the previous urine analysis. The patient had urinalysis 50 white cells but numerous epithelial cells felt it was a contaminated specimen. I think the best way to assess whether the patient truly has a UTI is to get a cath UA she is agreeable less being obtained.    Urinalysis was obtained and shows no UTI. Patient's discharged to follow up for dialysis        FINAL IMPRESSION  1. History of UTI  2.   3.         Electronically signed by: Steven Green, 4/16/2017 9:27 PM

## 2017-04-17 NOTE — ED NOTES
Seen and examined by erp-orders recvd . Plan reviewed with pt and grand dtr. Urine spec obtained via minicath

## 2017-04-17 NOTE — ED PROVIDER NOTES
ED Provider Note    2333: Patient updated unremarkable urinalysis, 0-2 white blood cells, nitrite negative, no evidence of UTI. She has dialysis tomorrow and will follow up with primary care.

## 2017-04-19 LAB
BACTERIA UR CULT: NORMAL
SIGNIFICANT IND 70042: NORMAL
SITE SITE: NORMAL
SOURCE SOURCE: NORMAL

## 2017-05-12 ENCOUNTER — RESOLUTE PROFESSIONAL BILLING HOSPITAL PROF FEE (OUTPATIENT)
Dept: HOSPITALIST | Facility: MEDICAL CENTER | Age: 82
End: 2017-05-12
Payer: MEDICARE

## 2017-05-12 ENCOUNTER — APPOINTMENT (OUTPATIENT)
Dept: RADIOLOGY | Facility: MEDICAL CENTER | Age: 82
DRG: 871 | End: 2017-05-12
Attending: EMERGENCY MEDICINE
Payer: MEDICARE

## 2017-05-12 ENCOUNTER — HOSPITAL ENCOUNTER (INPATIENT)
Facility: MEDICAL CENTER | Age: 82
LOS: 5 days | DRG: 871 | End: 2017-05-17
Attending: EMERGENCY MEDICINE | Admitting: HOSPITALIST
Payer: MEDICARE

## 2017-05-12 DIAGNOSIS — J18.9 PNEUMONIA DUE TO INFECTIOUS ORGANISM, UNSPECIFIED LATERALITY, UNSPECIFIED PART OF LUNG: ICD-10-CM

## 2017-05-12 PROBLEM — R65.10 SIRS (SYSTEMIC INFLAMMATORY RESPONSE SYNDROME) (HCC): Status: ACTIVE | Noted: 2017-05-12

## 2017-05-12 PROBLEM — J96.01 ACUTE RESPIRATORY FAILURE WITH HYPOXIA (HCC): Status: ACTIVE | Noted: 2017-05-12

## 2017-05-12 LAB
ALBUMIN SERPL BCP-MCNC: 3.7 G/DL (ref 3.2–4.9)
ALBUMIN/GLOB SERPL: 1.1 G/DL
ALP SERPL-CCNC: 132 U/L (ref 30–99)
ALT SERPL-CCNC: 13 U/L (ref 2–50)
AMORPH CRY #/AREA URNS HPF: PRESENT /HPF
ANION GAP SERPL CALC-SCNC: 8 MMOL/L (ref 0–11.9)
APPEARANCE UR: CLEAR
AST SERPL-CCNC: 33 U/L (ref 12–45)
BASOPHILS # BLD AUTO: 1.2 % (ref 0–1.8)
BASOPHILS # BLD: 0.04 K/UL (ref 0–0.12)
BILIRUB SERPL-MCNC: 1 MG/DL (ref 0.1–1.5)
BILIRUB UR QL STRIP.AUTO: NEGATIVE
BUN SERPL-MCNC: 16 MG/DL (ref 8–22)
CALCIUM SERPL-MCNC: 9.1 MG/DL (ref 8.5–10.5)
CHLORIDE SERPL-SCNC: 94 MMOL/L (ref 96–112)
CO2 SERPL-SCNC: 33 MMOL/L (ref 20–33)
COLOR UR: YELLOW
CREAT SERPL-MCNC: 3.25 MG/DL (ref 0.5–1.4)
EOSINOPHIL # BLD AUTO: 0.02 K/UL (ref 0–0.51)
EOSINOPHIL NFR BLD: 0.6 % (ref 0–6.9)
ERYTHROCYTE [DISTWIDTH] IN BLOOD BY AUTOMATED COUNT: 56.9 FL (ref 35.9–50)
GFR SERPL CREATININE-BSD FRML MDRD: 14 ML/MIN/1.73 M 2
GLOBULIN SER CALC-MCNC: 3.4 G/DL (ref 1.9–3.5)
GLUCOSE SERPL-MCNC: 85 MG/DL (ref 65–99)
GLUCOSE UR STRIP.AUTO-MCNC: NEGATIVE MG/DL
HCT VFR BLD AUTO: 33.7 % (ref 37–47)
HGB BLD-MCNC: 10.9 G/DL (ref 12–16)
IMM GRANULOCYTES # BLD AUTO: 0.01 K/UL (ref 0–0.11)
IMM GRANULOCYTES NFR BLD AUTO: 0.3 % (ref 0–0.9)
INR PPP: 1.08 (ref 0.87–1.13)
KETONES UR STRIP.AUTO-MCNC: NEGATIVE MG/DL
LACTATE BLD-SCNC: 1.4 MMOL/L (ref 0.5–2)
LACTATE BLD-SCNC: 1.5 MMOL/L (ref 0.5–2)
LEUKOCYTE ESTERASE UR QL STRIP.AUTO: NEGATIVE
LYMPHOCYTES # BLD AUTO: 0.53 K/UL (ref 1–4.8)
LYMPHOCYTES NFR BLD: 16.2 % (ref 22–41)
MCH RBC QN AUTO: 31.7 PG (ref 27–33)
MCHC RBC AUTO-ENTMCNC: 32.3 G/DL (ref 33.6–35)
MCV RBC AUTO: 98 FL (ref 81.4–97.8)
MICRO URNS: ABNORMAL
MONOCYTES # BLD AUTO: 0.52 K/UL (ref 0–0.85)
MONOCYTES NFR BLD AUTO: 15.9 % (ref 0–13.4)
MUCOUS THREADS #/AREA URNS HPF: NORMAL /HPF
NEUTROPHILS # BLD AUTO: 2.15 K/UL (ref 2–7.15)
NEUTROPHILS NFR BLD: 65.8 % (ref 44–72)
NITRITE UR QL STRIP.AUTO: NEGATIVE
NRBC # BLD AUTO: 0 K/UL
NRBC BLD AUTO-RTO: 0 /100 WBC
PH UR STRIP.AUTO: 8 [PH]
PLATELET # BLD AUTO: 71 K/UL (ref 164–446)
PMV BLD AUTO: 12 FL (ref 9–12.9)
POTASSIUM SERPL-SCNC: 4 MMOL/L (ref 3.6–5.5)
PROT SERPL-MCNC: 7.1 G/DL (ref 6–8.2)
PROT UR QL STRIP: >=500 MG/DL
PROTHROMBIN TIME: 14.3 SEC (ref 12–14.6)
RBC # BLD AUTO: 3.44 M/UL (ref 4.2–5.4)
RBC # URNS HPF: NORMAL /HPF
RBC UR QL AUTO: NEGATIVE
SODIUM SERPL-SCNC: 135 MMOL/L (ref 135–145)
SP GR UR STRIP.AUTO: 1
WBC # BLD AUTO: 3.3 K/UL (ref 4.8–10.8)
WBC #/AREA URNS HPF: NORMAL /HPF

## 2017-05-12 PROCEDURE — 700111 HCHG RX REV CODE 636 W/ 250 OVERRIDE (IP): Performed by: EMERGENCY MEDICINE

## 2017-05-12 PROCEDURE — 87503 INFLUENZA DNA AMP PROB ADDL: CPT

## 2017-05-12 PROCEDURE — 99285 EMERGENCY DEPT VISIT HI MDM: CPT

## 2017-05-12 PROCEDURE — 96365 THER/PROPH/DIAG IV INF INIT: CPT

## 2017-05-12 PROCEDURE — 87502 INFLUENZA DNA AMP PROBE: CPT

## 2017-05-12 PROCEDURE — 71010 DX-CHEST-PORTABLE (1 VIEW): CPT

## 2017-05-12 PROCEDURE — 36415 COLL VENOUS BLD VENIPUNCTURE: CPT

## 2017-05-12 PROCEDURE — 80053 COMPREHEN METABOLIC PANEL: CPT

## 2017-05-12 PROCEDURE — 304561 HCHG STAT O2

## 2017-05-12 PROCEDURE — 99223 1ST HOSP IP/OBS HIGH 75: CPT | Mod: AI | Performed by: HOSPITALIST

## 2017-05-12 PROCEDURE — 87040 BLOOD CULTURE FOR BACTERIA: CPT

## 2017-05-12 PROCEDURE — A9270 NON-COVERED ITEM OR SERVICE: HCPCS | Performed by: HOSPITALIST

## 2017-05-12 PROCEDURE — 83605 ASSAY OF LACTIC ACID: CPT

## 2017-05-12 PROCEDURE — 85025 COMPLETE CBC W/AUTO DIFF WBC: CPT

## 2017-05-12 PROCEDURE — 85610 PROTHROMBIN TIME: CPT

## 2017-05-12 PROCEDURE — 770006 HCHG ROOM/CARE - MED/SURG/GYN SEMI*

## 2017-05-12 PROCEDURE — 700105 HCHG RX REV CODE 258: Performed by: HOSPITALIST

## 2017-05-12 PROCEDURE — 700102 HCHG RX REV CODE 250 W/ 637 OVERRIDE(OP): Performed by: HOSPITALIST

## 2017-05-12 PROCEDURE — 304562 HCHG STAT O2 MASK/CANNULA

## 2017-05-12 PROCEDURE — 87086 URINE CULTURE/COLONY COUNT: CPT

## 2017-05-12 PROCEDURE — 700111 HCHG RX REV CODE 636 W/ 250 OVERRIDE (IP): Performed by: HOSPITALIST

## 2017-05-12 PROCEDURE — 81001 URINALYSIS AUTO W/SCOPE: CPT

## 2017-05-12 RX ORDER — METOPROLOL SUCCINATE 25 MG/1
25 TABLET, EXTENDED RELEASE ORAL DAILY
Status: DISCONTINUED | OUTPATIENT
Start: 2017-05-12 | End: 2017-05-17 | Stop reason: HOSPADM

## 2017-05-12 RX ORDER — AZITHROMYCIN 250 MG/1
250 TABLET, FILM COATED ORAL EVERY 24 HOURS
Status: COMPLETED | OUTPATIENT
Start: 2017-05-13 | End: 2017-05-16

## 2017-05-12 RX ORDER — AMOXICILLIN 250 MG
2 CAPSULE ORAL 2 TIMES DAILY
Status: DISCONTINUED | OUTPATIENT
Start: 2017-05-12 | End: 2017-05-17 | Stop reason: HOSPADM

## 2017-05-12 RX ORDER — SEVELAMER HYDROCHLORIDE 800 MG/1
800 TABLET, FILM COATED ORAL 2 TIMES DAILY WITH MEALS
Status: DISCONTINUED | OUTPATIENT
Start: 2017-05-12 | End: 2017-05-17 | Stop reason: HOSPADM

## 2017-05-12 RX ORDER — AMPICILLIN AND SULBACTAM 2; 1 G/1; G/1
3 INJECTION, POWDER, FOR SOLUTION INTRAMUSCULAR; INTRAVENOUS ONCE
Status: COMPLETED | OUTPATIENT
Start: 2017-05-12 | End: 2017-05-12

## 2017-05-12 RX ORDER — SODIUM CHLORIDE 9 MG/ML
1000 INJECTION, SOLUTION INTRAVENOUS ONCE
Status: COMPLETED | OUTPATIENT
Start: 2017-05-12 | End: 2017-05-12

## 2017-05-12 RX ORDER — SODIUM CHLORIDE 9 MG/ML
500 INJECTION, SOLUTION INTRAVENOUS
Status: DISCONTINUED | OUTPATIENT
Start: 2017-05-12 | End: 2017-05-17 | Stop reason: HOSPADM

## 2017-05-12 RX ORDER — AZITHROMYCIN 500 MG/1
500 INJECTION, POWDER, LYOPHILIZED, FOR SOLUTION INTRAVENOUS ONCE
Status: DISCONTINUED | OUTPATIENT
Start: 2017-05-12 | End: 2017-05-12

## 2017-05-12 RX ORDER — ONDANSETRON 4 MG/1
4 TABLET, ORALLY DISINTEGRATING ORAL EVERY 4 HOURS PRN
Status: DISCONTINUED | OUTPATIENT
Start: 2017-05-12 | End: 2017-05-17 | Stop reason: HOSPADM

## 2017-05-12 RX ORDER — SIMVASTATIN 40 MG
40 TABLET ORAL NIGHTLY
Status: DISCONTINUED | OUTPATIENT
Start: 2017-05-12 | End: 2017-05-17 | Stop reason: HOSPADM

## 2017-05-12 RX ORDER — AMPICILLIN AND SULBACTAM 2; 1 G/1; G/1
3 INJECTION, POWDER, FOR SOLUTION INTRAMUSCULAR; INTRAVENOUS ONCE
Status: DISCONTINUED | OUTPATIENT
Start: 2017-05-12 | End: 2017-05-12

## 2017-05-12 RX ORDER — SODIUM CHLORIDE 9 MG/ML
30 INJECTION, SOLUTION INTRAVENOUS
Status: DISCONTINUED | OUTPATIENT
Start: 2017-05-12 | End: 2017-05-12

## 2017-05-12 RX ORDER — GUAIFENESIN/DEXTROMETHORPHAN 100-10MG/5
10 SYRUP ORAL EVERY 6 HOURS PRN
Status: DISCONTINUED | OUTPATIENT
Start: 2017-05-12 | End: 2017-05-17 | Stop reason: HOSPADM

## 2017-05-12 RX ORDER — LOSARTAN POTASSIUM 25 MG/1
25 TABLET ORAL DAILY
COMMUNITY

## 2017-05-12 RX ORDER — ONDANSETRON 2 MG/ML
4 INJECTION INTRAMUSCULAR; INTRAVENOUS EVERY 4 HOURS PRN
Status: DISCONTINUED | OUTPATIENT
Start: 2017-05-12 | End: 2017-05-17 | Stop reason: HOSPADM

## 2017-05-12 RX ORDER — WARFARIN SODIUM 4 MG/1
4 TABLET ORAL
Status: COMPLETED | OUTPATIENT
Start: 2017-05-12 | End: 2017-05-13

## 2017-05-12 RX ORDER — BISACODYL 10 MG
10 SUPPOSITORY, RECTAL RECTAL
Status: DISCONTINUED | OUTPATIENT
Start: 2017-05-12 | End: 2017-05-17 | Stop reason: HOSPADM

## 2017-05-12 RX ORDER — POLYETHYLENE GLYCOL 3350 17 G/17G
1 POWDER, FOR SOLUTION ORAL
Status: DISCONTINUED | OUTPATIENT
Start: 2017-05-12 | End: 2017-05-17 | Stop reason: HOSPADM

## 2017-05-12 RX ORDER — AZITHROMYCIN 250 MG/1
500 TABLET, FILM COATED ORAL ONCE
Status: COMPLETED | OUTPATIENT
Start: 2017-05-12 | End: 2017-05-12

## 2017-05-12 RX ORDER — SEVELAMER CARBONATE 800 MG/1
1 TABLET, FILM COATED ORAL 2 TIMES DAILY
Status: DISCONTINUED | OUTPATIENT
Start: 2017-05-12 | End: 2017-05-12

## 2017-05-12 RX ORDER — ACETAMINOPHEN 325 MG/1
650 TABLET ORAL EVERY 6 HOURS PRN
Status: DISCONTINUED | OUTPATIENT
Start: 2017-05-12 | End: 2017-05-17 | Stop reason: HOSPADM

## 2017-05-12 RX ORDER — LOSARTAN POTASSIUM 25 MG/1
25 TABLET ORAL DAILY
Status: DISCONTINUED | OUTPATIENT
Start: 2017-05-12 | End: 2017-05-17 | Stop reason: HOSPADM

## 2017-05-12 RX ADMIN — METOPROLOL SUCCINATE 25 MG: 25 TABLET, EXTENDED RELEASE ORAL at 20:32

## 2017-05-12 RX ADMIN — SODIUM CHLORIDE 1000 ML: 9 INJECTION, SOLUTION INTRAVENOUS at 20:11

## 2017-05-12 RX ADMIN — RENAGEL 800 MG: 800 TABLET ORAL at 20:32

## 2017-05-12 RX ADMIN — AMPICILLIN SODIUM AND SULBACTAM SODIUM 3 G: 2; 1 INJECTION, POWDER, FOR SOLUTION INTRAMUSCULAR; INTRAVENOUS at 20:35

## 2017-05-12 RX ADMIN — ACETAMINOPHEN 650 MG: 325 TABLET, FILM COATED ORAL at 20:11

## 2017-05-12 RX ADMIN — SIMVASTATIN 40 MG: 40 TABLET, FILM COATED ORAL at 20:32

## 2017-05-12 RX ADMIN — AZITHROMYCIN 500 MG: 250 TABLET, FILM COATED ORAL at 20:32

## 2017-05-12 RX ADMIN — GUAIFENESIN AND DEXTROMETHORPHAN 10 ML: 100; 10 SYRUP ORAL at 23:35

## 2017-05-12 RX ADMIN — LOSARTAN POTASSIUM 25 MG: 50 TABLET, FILM COATED ORAL at 20:32

## 2017-05-12 ASSESSMENT — COPD QUESTIONNAIRES
COPD SCREENING SCORE: 4
HAVE YOU SMOKED AT LEAST 100 CIGARETTES IN YOUR ENTIRE LIFE: NO/DON'T KNOW
DURING THE PAST 4 WEEKS HOW MUCH DID YOU FEEL SHORT OF BREATH: SOME OF THE TIME
DO YOU EVER COUGH UP ANY MUCUS OR PHLEGM?: NO/ONLY WITH OCCASIONAL COLDS OR INFECTIONS

## 2017-05-12 ASSESSMENT — LIFESTYLE VARIABLES
DO YOU DRINK ALCOHOL: NO
EVER_SMOKED: NEVER
ALCOHOL_USE: NO

## 2017-05-12 ASSESSMENT — PAIN SCALES - GENERAL: PAINLEVEL_OUTOF10: 5

## 2017-05-12 NOTE — IP AVS SNAPSHOT
5/17/2017    Skye James  [address]  [place] [address]    Dear Skye:    [facility] wants to ensure your discharge home is safe and you or your loved ones have had all of your questions answered regarding your care after you leave the hospital.    Below is a list of resources and contact information should you have any questions regarding your hospital stay, follow-up instructions, or active medical symptoms.    Questions or Concerns Regarding… Contact   Medical Questions Related to Your Discharge  (7 days a week, 8am-5pm) Contact a Nurse Care Coordinator   [phone]   Medical Questions Not Related to Your Discharge  (24 hours a day / 7 days a week)  Contact the Nurse Health Line   [phone]    Medications or Discharge Instructions Refer to your discharge packet   or contact your [facility] Primary Care Provider   [phone]   Follow-up Appointment(s) Schedule your appointment via Kelan   or contact Scheduling [phone]   Billing Review your statement via Kelan  or contact Billing [phone]   Medical Records Review your records via Kelan   or contact Medical Records [phone]     You may receive a telephone call within two days of discharge. This call is to make certain you understand your discharge instructions and have the opportunity to have any questions answered. You can also easily access your medical information, test results and upcoming appointments via the Kelan free online health management tool. You can learn more and sign up at Submittable/Kelan. For assistance setting up your Kelan account, please call [phone].    Once again, we want to ensure your discharge home is safe and that you have a clear understanding of any next steps in your care. If you have any questions or concerns, please do not hesitate to contact us, we are here for you. Thank you for choosing [facility] for your healthcare needs.    Sincerely,    Your [facility] Healthcare Team

## 2017-05-12 NOTE — IP AVS SNAPSHOT
[employer] Access Code: [email or web address]  Expires: 6/15/2017 12:44 PM    Your email address is not on file at [facility].  Email Addresses are required for you to sign up for [employer], please contact [phone] to verify your personal information and to provide your email address prior to attempting to register for [employer].    Skye James  [address] [place] [address]    [employer]  A secure, online tool to manage your health information     [facility]’s [employer]® is a secure, online tool that connects you to your personalized health information from the privacy of your home -- day or night - making it very easy for you to manage your healthcare. Once the activation process is completed, you can even access your medical information using the [employer] katheryn, which is available for free in the Apple Katheryn store or Google Play store.     To learn more about [employer], visit [email or web address]    There are two levels of access available (as shown below):   My Chart Features  [facility] Primary Care Doctor [facility]  Specialists [facility]  Urgent  Care Non-[facility] Primary Care Doctor   Email your healthcare team securely and privately 24/7 X X X    Manage appointments: schedule your next appointment; view details of past/upcoming appointments X      Request prescription refills. X      View recent personal medical records, including lab and immunizations X X X X   View health record, including health history, allergies, medications X X X X   Read reports about your outpatient visits, procedures, consult and ER notes X X X X   See your discharge summary, which is a recap of your hospital and/or ER visit that includes your diagnosis, lab results, and care plan X X  X     How to register for [employer]:  Once your e-mail address has been verified, follow the following steps to sign up for [employer].     1. Go to  [email or web address]  2. Click on the Sign Up Now box, which takes you to the New Member Sign Up page.  You will need to provide the following information:  a. Enter your [employer] Access Code exactly as it appears at the top of this page. (You will not need to use this code after you’ve completed the sign-up process. If you do not sign up before the expiration date, you must request a new code.)   b. Enter your date of birth.   c. Enter your home email address.   d. Click Submit, and follow the next screen’s instructions.  3. Create a MedicAnimal.comt ID. This will be your [employer] login ID and cannot be changed, so think of one that is secure and easy to remember.  4. Create a [employer] password. You can change your password at any time.  5. Enter your Password Reset Question and Answer. This can be used at a later time if you forget your password.   6. Enter your e-mail address. This allows you to receive e-mail notifications when new information is available in [employer].  7. Click Sign Up. You can now view your health information.    For assistance activating your [employer] account, call [phone]

## 2017-05-12 NOTE — IP AVS SNAPSHOT
" <p align=\"LEFT\"><IMG SRC=\"//EMRWB/blob$/[email or web address]\" alt=\"Image\" WIDTH=\"50%\" HEIGHT=\"200\" BORDER=\"\"></p>                   Name:Skye Pateson  Medical Record Number:[id number]  CSN: [id number]    YOB: 1935   Age: 81 y.o.  Sex: female  HT:1.575 m (5' 2\") WT: 52.3 kg (115 lb 4.8 oz)          Admit Date: 5/12/2017     Discharge Date:   Today's Date: 5/17/2017  Attending Doctor:  Kavitha Sheth M.D.                  Allergies:  Nkda          Your appointments     Jun 23, 2017  9:00 AM   Established Patient with Alistair Gandara M.D.   [facility] (--)    [address]  [address]  [place] [address] [phone]           You will be receiving a confirmation call a few days before your appointment from our automated call confirmation system.              Follow-up Information     1. Follow up with Alistair Gandara M.D.. Schedule an appointment as soon as possible for a visit in 2 weeks.    Specialty:  Internal Medicine    Why:  Follow Up    Contact information    [address] [place] [address] Dr TAISHA LYONS [address] [phone]          2. Follow up with [facility], Emergency Dept.    Specialty:  Emergency Medicine    Why:  As needed, If symptoms worsen    Contact information    [address] [facility] [address] [address] [phone]         Medication List      Take these Medications        Instructions    acetaminophen 325 MG Tabs   Commonly known as:  TYLENOL    Take 2 Tabs by mouth every 6 hours as needed (Mild Pain; (Pain scale 1-3); Temp greater than 100.5 F).   Dose:  650 mg       amoxicillin-clavulanate 500-125 MG Tabs   Start taking on:  5/18/2017   Commonly known as:  AUGMENTIN    Take 1 Tab by mouth every 24 hours for 5 days.   Dose:  1 Tab       benzocaine-menthol 15-3.6 MG Lozg   Commonly known as:  CEPACOL    Spray 1 Lozenge in mouth/throat every 2 hours as needed.   Dose:  1 Lozenge       guaifenesin -10 MG/5ML Syrp syrup   Commonly known as:  " ROBITUSSIN DM    Take 10 mL by mouth every 6 hours as needed for Cough.   Dose:  10 mL       losartan 25 MG Tabs   Commonly known as:  COZAAR    Take 25 mg by mouth every day.   Dose:  25 mg       metoprolol SR 25 MG Tb24   Commonly known as:  TOPROL XL    Take 25 mg by mouth every day.   Dose:  25 mg       ondansetron 4 MG Tbdp   Commonly known as:  ZOFRAN ODT    Take 1 Tab by mouth every four hours as needed for Nausea/Vomiting (give PO if IV route is unavailable. May give per feeding tube.).   Dose:  4 mg       oseltamivir 30 MG Caps   Commonly known as:  TAMIFLU    Take 1 Cap by mouth Once for 1 dose. At 2100   Dose:  30 mg       predniSONE 20 MG Tabs   Commonly known as:  DELTASONE    Take 20mg for 3 days then 10mg for 3 days then stop       senna-docusate 8.6-50 MG Tabs   Commonly known as:  PERICOLACE or SENOKOT S    Take 2 Tabs by mouth 2 Times a Day.   Dose:  2 Tab       SENSIPAR 90 MG Tabs   Generic drug:  Cinacalcet HCl    Take 1 Tab by mouth every day.   Dose:  1 Tab       sevelamer 800 MG Tabs   Commonly known as:  RENAGEL    Take 1 Tab by mouth 2 times a day, with meals.   Dose:  800 mg       Sevelamer Carbonate 800 MG Tabs    Take 1 Tab by mouth 2 Times a Day.   Dose:  1 Tab       simvastatin 40 MG Tabs   Commonly known as:  ZOCOR    Take 40 mg by mouth every evening.   Dose:  40 mg       warfarin 2 MG Tabs   Commonly known as:  COUMADIN    Take 1 Tab by mouth COUMADIN-DAILY.   Dose:  2 mg

## 2017-05-13 ENCOUNTER — APPOINTMENT (OUTPATIENT)
Dept: RADIOLOGY | Facility: MEDICAL CENTER | Age: 82
DRG: 871 | End: 2017-05-13
Attending: HOSPITALIST
Payer: MEDICARE

## 2017-05-13 PROBLEM — A41.9 SEPSIS, UNSPECIFIED: Status: ACTIVE | Noted: 2017-05-12

## 2017-05-13 PROBLEM — D68.32 HEMORRHAGIC DISORDER DUE TO EXTRINSIC CIRCULATING ANTICOAGULANTS (HCC): Status: ACTIVE | Noted: 2017-05-13

## 2017-05-13 PROBLEM — D69.6 THROMBOCYTOPENIA (HCC): Status: ACTIVE | Noted: 2017-05-13

## 2017-05-13 PROBLEM — E78.5 HLD (HYPERLIPIDEMIA): Status: ACTIVE | Noted: 2017-05-13

## 2017-05-13 LAB
ANION GAP SERPL CALC-SCNC: 11 MMOL/L (ref 0–11.9)
BUN SERPL-MCNC: 25 MG/DL (ref 8–22)
CALCIUM SERPL-MCNC: 8.6 MG/DL (ref 8.5–10.5)
CHLORIDE SERPL-SCNC: 97 MMOL/L (ref 96–112)
CO2 SERPL-SCNC: 30 MMOL/L (ref 20–33)
CREAT SERPL-MCNC: 4.21 MG/DL (ref 0.5–1.4)
CRP SERPL HS-MCNC: 2.22 MG/DL (ref 0–0.75)
ERYTHROCYTE [DISTWIDTH] IN BLOOD BY AUTOMATED COUNT: 58.5 FL (ref 35.9–50)
FLUAV H1 2009 PAND RNA SPEC QL NAA+PROBE: NOT DETECTED
FLUAV RNA SPEC QL NAA+PROBE: NEGATIVE
FLUBV RNA SPEC QL NAA+PROBE: POSITIVE
GFR SERPL CREATININE-BSD FRML MDRD: 10 ML/MIN/1.73 M 2
GLUCOSE SERPL-MCNC: 82 MG/DL (ref 65–99)
GRAM STN SPEC: NORMAL
HCT VFR BLD AUTO: 34.1 % (ref 37–47)
HGB BLD-MCNC: 10.6 G/DL (ref 12–16)
INR PPP: 1.25 (ref 0.87–1.13)
MCH RBC QN AUTO: 31 PG (ref 27–33)
MCHC RBC AUTO-ENTMCNC: 31.1 G/DL (ref 33.6–35)
MCV RBC AUTO: 99.7 FL (ref 81.4–97.8)
PLATELET # BLD AUTO: 57 K/UL (ref 164–446)
PMV BLD AUTO: 12.8 FL (ref 9–12.9)
POTASSIUM SERPL-SCNC: 4.7 MMOL/L (ref 3.6–5.5)
PROTHROMBIN TIME: 16.1 SEC (ref 12–14.6)
RBC # BLD AUTO: 3.42 M/UL (ref 4.2–5.4)
SIGNIFICANT IND 70042: NORMAL
SITE SITE: NORMAL
SODIUM SERPL-SCNC: 138 MMOL/L (ref 135–145)
SOURCE SOURCE: NORMAL
WBC # BLD AUTO: 3.9 K/UL (ref 4.8–10.8)

## 2017-05-13 PROCEDURE — 80048 BASIC METABOLIC PNL TOTAL CA: CPT

## 2017-05-13 PROCEDURE — 85027 COMPLETE CBC AUTOMATED: CPT

## 2017-05-13 PROCEDURE — 87070 CULTURE OTHR SPECIMN AEROBIC: CPT

## 2017-05-13 PROCEDURE — 86140 C-REACTIVE PROTEIN: CPT

## 2017-05-13 PROCEDURE — 770020 HCHG ROOM/CARE - TELE (206)

## 2017-05-13 PROCEDURE — A9270 NON-COVERED ITEM OR SERVICE: HCPCS

## 2017-05-13 PROCEDURE — A9270 NON-COVERED ITEM OR SERVICE: HCPCS | Performed by: HOSPITALIST

## 2017-05-13 PROCEDURE — 700102 HCHG RX REV CODE 250 W/ 637 OVERRIDE(OP): Performed by: INTERNAL MEDICINE

## 2017-05-13 PROCEDURE — 99233 SBSQ HOSP IP/OBS HIGH 50: CPT | Performed by: INTERNAL MEDICINE

## 2017-05-13 PROCEDURE — 700102 HCHG RX REV CODE 250 W/ 637 OVERRIDE(OP)

## 2017-05-13 PROCEDURE — 700105 HCHG RX REV CODE 258: Performed by: HOSPITALIST

## 2017-05-13 PROCEDURE — 700102 HCHG RX REV CODE 250 W/ 637 OVERRIDE(OP): Performed by: HOSPITALIST

## 2017-05-13 PROCEDURE — 700111 HCHG RX REV CODE 636 W/ 250 OVERRIDE (IP): Performed by: HOSPITALIST

## 2017-05-13 PROCEDURE — 36415 COLL VENOUS BLD VENIPUNCTURE: CPT

## 2017-05-13 PROCEDURE — 71010 DX-CHEST-PORTABLE (1 VIEW): CPT

## 2017-05-13 PROCEDURE — 87205 SMEAR GRAM STAIN: CPT

## 2017-05-13 PROCEDURE — 85610 PROTHROMBIN TIME: CPT

## 2017-05-13 PROCEDURE — A9270 NON-COVERED ITEM OR SERVICE: HCPCS | Performed by: INTERNAL MEDICINE

## 2017-05-13 RX ORDER — IBUPROFEN 200 MG
200 TABLET ORAL EVERY 6 HOURS PRN
Status: DISCONTINUED | OUTPATIENT
Start: 2017-05-13 | End: 2017-05-14

## 2017-05-13 RX ORDER — WARFARIN SODIUM 4 MG/1
4 TABLET ORAL
Status: DISCONTINUED | OUTPATIENT
Start: 2017-05-13 | End: 2017-05-15

## 2017-05-13 RX ORDER — OSELTAMIVIR PHOSPHATE 30 MG/1
30 CAPSULE ORAL DAILY
Status: DISCONTINUED | OUTPATIENT
Start: 2017-05-13 | End: 2017-05-14

## 2017-05-13 RX ADMIN — AZITHROMYCIN 250 MG: 250 TABLET, FILM COATED ORAL at 21:05

## 2017-05-13 RX ADMIN — OSELTAMIVIR PHOSPHATE 30 MG: 30 CAPSULE ORAL at 10:13

## 2017-05-13 RX ADMIN — WARFARIN SODIUM 4 MG: 4 TABLET ORAL at 19:18

## 2017-05-13 RX ADMIN — WARFARIN SODIUM 4 MG: 4 TABLET ORAL at 00:30

## 2017-05-13 RX ADMIN — RENAGEL 800 MG: 800 TABLET ORAL at 10:13

## 2017-05-13 RX ADMIN — LOSARTAN POTASSIUM 25 MG: 50 TABLET, FILM COATED ORAL at 10:12

## 2017-05-13 RX ADMIN — SIMVASTATIN 40 MG: 40 TABLET, FILM COATED ORAL at 21:05

## 2017-05-13 RX ADMIN — STANDARDIZED SENNA CONCENTRATE AND DOCUSATE SODIUM 2 TABLET: 8.6; 5 TABLET, FILM COATED ORAL at 21:05

## 2017-05-13 RX ADMIN — AMPICILLIN SODIUM AND SULBACTAM SODIUM 3 G: 2; 1 INJECTION, POWDER, FOR SOLUTION INTRAMUSCULAR; INTRAVENOUS at 02:39

## 2017-05-13 RX ADMIN — METOPROLOL SUCCINATE 25 MG: 25 TABLET, EXTENDED RELEASE ORAL at 21:08

## 2017-05-13 RX ADMIN — IBUPROFEN 200 MG: 200 TABLET, FILM COATED ORAL at 12:44

## 2017-05-13 RX ADMIN — AMPICILLIN SODIUM AND SULBACTAM SODIUM 3 G: 2; 1 INJECTION, POWDER, FOR SOLUTION INTRAMUSCULAR; INTRAVENOUS at 10:12

## 2017-05-13 ASSESSMENT — PAIN SCALES - GENERAL
PAINLEVEL_OUTOF10: 0
PAINLEVEL_OUTOF10: 5
PAINLEVEL_OUTOF10: 5
PAINLEVEL_OUTOF10: 0
PAINLEVEL_OUTOF10: 0

## 2017-05-13 NOTE — CARE PLAN
Problem: Respiratory:  Goal: Respiratory status will improve  Outcome: PROGRESSING AS EXPECTED  Encourage patient to wear oxygen therapy    Problem: Fluid Volume:  Goal: Will maintain balanced intake and output  Outcome: PROGRESSING AS EXPECTED  Monitor patient fluid status every shift

## 2017-05-13 NOTE — PROGRESS NOTES
Hospital Medicine Interval Note  Date of Service:  5/13/2017    Chief Complaint  81/F with HTN, HLD ESRD on HD MWF, h/o recurrent DVT, on AC, Hypothyroidism, admitted for fevers, cough productive of yellow phlegm, and chills with malaise and fatigue. CXR showed interstitial densities. WBC 3.3. Crea 3.25, with normal K, Na. Lactate 1.4. UA unimpressive. Requiring O2. Felt to have CAP. Started on antibiotics.             Interval Problem Update  5/13/2017 - uneventful night. VSS. Requiring 2L O2 NC. Had initial fevers, but afebrile overnight. Influenza B positive. No labs this morning. CXR showed worsening left basilar opacity could relate to worsening atelectasis or developing pneumonia (personally reviewed).    > Seen and examined. Looks weak. States she's SOB. (+) cough with yellow phlegm. Denied any CP, nausea, vomiting, abd pain, diarrhea.       Consultants/Specialty  Nephrology    Disposition  Monitor on telemetry       ROS     Pertinent positives/negatives as mentioned above.     A complete review of systems was done. All other systems were negative.      Physical Exam Laboratory/Imaging   Filed Vitals:    05/12/17 2129 05/13/17 0000 05/13/17 0400 05/13/17 0800   BP:  121/37 164/63 146/52   Pulse:  62 77 68   Temp:  36.7 °C (98.1 °F) 36.8 °C (98.3 °F) 37.5 °C (99.5 °F)   Resp:  18 16 19   Height:       Weight: 45.8 kg (100 lb 15.5 oz) 46.7 kg (102 lb 15.3 oz)     SpO2:  99% 100% 98%     Physical Exam   Constitutional: She is oriented to person, place, and time. She appears well-developed and well-nourished. No distress.   Weak looking   HENT:   Head: Normocephalic and atraumatic.   Mouth/Throat: No oropharyngeal exudate.   Eyes: Conjunctivae are normal. Pupils are equal, round, and reactive to light. No scleral icterus.   Neck: Normal range of motion. Neck supple.   Cardiovascular: Normal rate and regular rhythm.  Exam reveals no gallop and no friction rub.    No murmur heard.  Pulmonary/Chest: Effort normal.  No respiratory distress. She has no wheezes. She has no rales. She exhibits no tenderness.   Diminished air entry, otherwise CTA.    Abdominal: Soft. Bowel sounds are normal. She exhibits no distension. There is no tenderness. There is no rebound and no guarding.   Musculoskeletal: Normal range of motion. She exhibits no edema or tenderness.   Lymphadenopathy:     She has no cervical adenopathy.   Neurological: She is alert and oriented to person, place, and time. No cranial nerve deficit.   Skin: Skin is warm and dry. No rash noted. No erythema. No pallor.   Psychiatric: Her behavior is normal. Judgment and thought content normal.   Flat affect   Nursing note and vitals reviewed.        Lab Results   Component Value Date/Time    WBC 3.9* 05/13/2017 07:58 AM    HEMOGLOBIN 10.6* 05/13/2017 07:58 AM    HEMATOCRIT 34.1* 05/13/2017 07:58 AM    PLATELET COUNT 57* 05/13/2017 07:58 AM     Lab Results   Component Value Date/Time    SODIUM 138 05/13/2017 07:58 AM    POTASSIUM 4.7 05/13/2017 07:58 AM    CHLORIDE 97 05/13/2017 07:58 AM    CO2 30 05/13/2017 07:58 AM    GLUCOSE 82 05/13/2017 07:58 AM    BUN 25* 05/13/2017 07:58 AM    CREATININE 4.21* 05/13/2017 07:58 AM      Assessment/Plan    Anemia of chronic disease (present on admission)  Assessment & Plan  - stable. Monitor Hgb. Transfuse for Hgb<7.     Essential hypertension (present on admission)  Assessment & Plan  - Continue metoprolol and Cozaar.    Sepsis due to influenza B (CMS-[facility]) (present on admission)  Assessment & Plan  - given her age and co-morbidities, will start her on tamiflu to complete course. Continue IV unasyn and azithromycin for now to cover for possible concomitant pneumonia. Will check CRP, and if low or trending down, will consider stopping antibiotics. Trend WBC.   - continue close hemodynamic monitoring. SO far, VS stable. Will defer IVF for now as dialysis dependent. Continue telemetry.     Acute respiratory failure with hypoxia due to influenza  B, not due to sepsis (CMS-HCC) (present on admission)  Assessment & Plan  - start tamiflu as above. Continue RT protocol, and O2 to keep sats>88%.     History of DVT of lower extremity (present on admission)  Assessment & Plan  - Continue on Coumadin.    End stage renal disease (CMS-[facility]) (present on admission)  Assessment & Plan  - I spoke with Dr. Mccarthy (nephrology) to resume her HD schedule while she's in-house.   - continue home sevelamer.    HLD (hyperlipidemia) (present on admission)  Assessment & Plan  - Continue Zocor.     Chronic thrombocytopenia (CMS-HCC) (present on admission)  Assessment & Plan  - stable. Monitor for bleeding. CBC in AM.     Hemorrhagic disorder due to extrinsic circulating anticoagulants (CMS-HCC) (present on admission)  Assessment & Plan  - for h/o recurrent DVT. Continue coumadin per pharmacy dosing.      Labs reviewed, Medications reviewed and Radiology images reviewed  Morataya catheter: No Morataya      DVT Prophylaxis: Warfarin (Coumadin)    Ulcer prophylaxis: Not indicated  Antibiotics: Treating active infection/contamination beyond 24 hours perioperative coverage  Assessed for rehab: Patient was assess for and/or received rehabilitation services during this hospitalization

## 2017-05-13 NOTE — PROGRESS NOTES
Inpatient Anticoagulation Service Note    Date: 5/12/2017  Reason for Anticoagulation: Deep Vein Thrombosis  Hemoglobin Value: 10.9  Hematocrit Value: 33.7  Lab Platelet Value: 71  Target INR: 2.0 to 3.0  INR from last 7 days     Date/Time INR Value    05/12/17 1825 1.08        Dose from last 7 days     Date/Time Dose (mg)    05/12/17 2300 4        Average Dose (mg):  (4 mg - concerned for noncompliance)  Significant Interactions: Antibiotics, Statin  Bridge Therapy: No     Comments:   History of multiple DVT.  INR today is nearly normal. Patient is not likely taking warfarin at home.  New interactions with antibiotics.  Patient was on warfarin [facility] at [facility] in [facility]. INR became supratherapeutic on her home dose.  Will start home dose tonight but will dose conservatively considering her likely noncompliance, new interactions, and acute illness.  INR tomorrow. Monitor for bleeding.      Plan: Warfarin 4mg po x1. Trend INR.   Education Material Provided?: No (chronic warfarin patient)  Pharmacist suggested discharge dosing: Possibly the home dose, question patient compliance.     Juan Miguel Conley, PharmD, BCPS

## 2017-05-13 NOTE — CARE PLAN
Problem: Knowledge Deficit  Goal: Knowledge of disease process/condition, treatment plan, diagnostic tests, and medications will improve  Outcome: PROGRESSING AS EXPECTED  Educate patient and family members on good handwashing    Problem: Fluid Volume:  Goal: Will maintain balanced intake and output  Outcome: PROGRESSING AS EXPECTED  Monitor patient's Intake and output

## 2017-05-13 NOTE — PROGRESS NOTES
Report received at bedside, assumed care. Pt is sleeping comfortably in bed, unlabored breathing. A&O x 4. No other concerns, complains or distress. Tele box on. Chart reviewed. Bed in lowest position, treaded slipper sock on, and call light within reach.

## 2017-05-13 NOTE — PROGRESS NOTES
Report received from ER nurse ; patient arrived to room 707 bed 2 accompanied by transporter and her son in stable condition with IV unasyn infusing. Patient complained of mild pain at IV site , IV positional and flushing; patient states pain subsided after placement verified. She is awake, alert and oriented with productive cough and right arm AV shunt with palpable thrill and audible bruit with dressing clean , dry and intact. Her sclera are reddened and her face appears to be swollen. Al pulses are palpable and she has a heart murmur. Skin is warm , loose and dry.

## 2017-05-13 NOTE — ED NOTES
Bib family c/o weakness, sob, fever, cough productive of clearand  bodyaches x 2 days, sepsis score of 3, charge rn aware

## 2017-05-13 NOTE — H&P
CHIEF COMPLAINT:  Fevers, cough, and chills.    PRIMARY MEDICAL PHYSICIAN:  Dr. Alistair Gandara.    HISTORY OF PRESENT ILLNESS:  This is an 81-year-old female with the multiple   medical problems who comes in with complaints of fever, cough, and chills.    The patient started feeling poorly 2 days ago on Wednesday with malaise and   fatigue.  Yesterday, she started to have fevers with shaking chills.  She also   developed cough, productive of yellow sputum with shortness of breath.  Her   granddaughter had nausea and vomiting and her son and grandson both have sore   throat.  Patient herself also reports a sore throat.  Patient does not use   oxygen at home.  She has had no abdominal pain, diarrhea, or dysuria and   otherwise prior to this was in her usual state of health, which is rather   frail and poor.    REVIEW OF SYSTEMS:  A full review of systems was completed and all pertinent   positives and negatives are included in the HPI above.    PAST MEDICAL HISTORY:  1.  Hypertension.  2.  Hyperlipidemia.  3.  End-stage renal disease, on hemodialysis Monday, Wednesday, and Friday.  4.  Deep venous thrombosis x3, on chronic anticoagulation.  5.  Angina.  6.  Glaucoma.  7.  Hypothyroidism.  8.  Pulmonary arterial hypertension.    PAST SURGICAL HISTORY:  1.  Tonsillectomy.  2.  Hysterectomy.  3.  AV fistula creation.  4.  Cataract surgery.    SOCIAL HISTORY:  No tobacco, alcohol, or illicit drugs.    FAMILY HISTORY:  Mother with hypertension.    ALLERGIES:  No known drug allergies.    HOME MEDICATIONS:  1.  Cozaar 25 mg p.o. daily.  2.  Sensipar 90 mg p.o. daily.  3.  Coumadin 4 mg p.o. daily.  4.  Metoprolol 25 mg p.o. daily.  5.  Renagel 800 mg p.o. b.i.d.  6.  Zocor 40 mg p.o. daily.    PHYSICAL EXAMINATION:  VITAL SIGNS:  Temperature 101.8, heart rate 82, respirations 16, blood   pressure 148/99, and patient is saturating at 80% on room air.  GENERAL:  This is an elderly and frail -American female who is in no    acute distress.  HEENT:  Normocephalic and atraumatic.  EOMI.  Dry mucous membranes.  NECK:  Supple.  There is no JVD and there is no supraclavicular adenopathy.  CARDIOVASCULAR:  Positive S1, S2, regular rate and rhythm.  No murmurs, rubs,   or gallops appreciated.  PULMONARY:  Very poor air movement bilaterally with coarse breath sounds at   the bases.  No wheezes, rubs, or rhonchi heard.  GASTROINTESTINAL:  Soft, nontender, and nondistended.  Positive bowel sounds.    No hepatosplenomegaly.  EXTREMITIES:  Warm and well-perfused:  No clubbing, cyanosis, or edema.    Capillary refill is less than 3 seconds.  Distal pulses are intact.  NEUROLOGICAL:  A and O x3.  Cranial nerves II-XII grossly intact.    LABORATORY STUDIES:  WBC 3.3, hemoglobin 10.9, hematocrit 33.7, and platelet   count 71.  Sodium 135, potassium 4.0, chloride 94, CO2 of 33, glucose 85, BUN   16, and creatinine 3.25.  GFR is 16.  Lactic acid 1.4.  UA is negative for   nitrites or leukocyte esterase.    RADIOGRAPHIC STUDIES:  Chest x-ray:  Interstitial densities, which could be   edema or fibrosis, enlarged cardiac silhouette, and aortic atherosclerotic   plaque.    ASSESSMENT AND PLAN:  This is an 81-year-old female who comes in with   complaints of fevers, cough and chills for the last 2 days.  Upon assessment   in the ER, she is noted to be febrile and hypoxic.  1.  Acute hypoxic respiratory failure with systemic inflammatory response   syndrome:  The patient is saturating at 80% on room air.  She is not on oxygen   at baseline.  She is febrile.  She has leukopenia, although this is chronic.    Otherwise, her blood pressure is stable with no lactic acidosis.  This could   be possible early sepsis and pulmonary etiology, although chest x-ray at this   point does not show any clear consolidations.  However, given her symptoms and   her age, we will cover empirically for community-acquired pneumonia with   Unasyn and azithromycin.  She will be on  respiratory therapy protocol.  We   will check her sputum sample.  We will follow up her blood cultures.  I will   check a influenza swab.  Her blood pressure is currently elevated.  Therefore,   there is no need for fluid resuscitation at this time.  I will plan to repeat   a chest x-ray in the morning to see, if anything has declared itself after a   night of fluid resuscitation.  2.  Hypertension.  Continue metoprolol and Cozaar.  3.  Hyperlipidemia.  Continue Zocor.  4.  End-stage renal disease.  Continue home sevelamer and we will plan to   consult nephrology in the morning for inpatient dialysis.  5.  Recurrent deep venous thrombosis:  Continue on Coumadin.  6.  Anemia of chronic disease:  At baseline.  7.  Thrombocytopenia:  The patient has had a history of this in the past,   there is no active bleeding at this time, we will continue to monitor.    CODE:  Full.       ____________________________________     MD REBECA FRY / LASHAY    DD:  05/12/2017 21:09:28  DT:  05/12/2017 21:31:38    D#:  [id number]  Job#:  [id number]

## 2017-05-13 NOTE — ED PROVIDER NOTES
ED Provider Note    Scribed for Oscar Recinos M.D. by Destiny Smallwood. 5/12/2017  6:27 PM    Primary care provider: Alistair Gandara M.D.  Means of arrival: Walk-in  History obtained from: Patient   History limited by: None    CHIEF COMPLAINT  Chief Complaint   Patient presents with   • Body Aches   • Weakness   • Shortness of Breath   • Cough       HPI  Skye James is a 81 y.o. female who presents to the Emergency Department for bilateral ear pain, onset 2 days ago. The patient also has a cough and had one episode of vomiting yesterday. She has difficulty standing up when she uses the restroom secondary to feeling light-headed and weak. She denies dysuria, abdominal pain, chest pain, or diarrhea. The patient has history of asthma. She has never drank or smoked in her life. The patient denies history of COPD.     REVIEW OF SYSTEMS  Pertinent positives include fever, ear pain, sore throat, cough, light-headedness, weakness. Pertinent negatives include no diarrhea, dysuria, abdominal pain, chest pain.  All other systems reviewed and negative.    PAST MEDICAL HISTORY   has a past medical history of GOUT; other; Hypertension; CKD (chronic kidney disease); Renal disorder; Arrhythmia; Heart murmur; MEDICAL HOME (4/23/2013); Arthritis; DVT (deep venous thrombosis) (CMS-HCC); CATARACT; Preoperative clearance (7/15/2013); Unspecified hemorrhagic conditions; Dialysis; Pneumonia (5/24/2014); Other specified disorder of intestines; ASTHMA; Asthma; Anemia; Dental disorder; Falls; Anginal syndrome (CMS-HCC); Glaucoma; Fall; Indigestion; Disorder of thyroid; Chronic anticoagulation; Moderate mitral regurgitation; and PAH (pulmonary artery hypertension) - liekly due to mitral regurgitation but with history of thromboembolic disease.    SURGICAL HISTORY   has past surgical history that includes tonsillectomy; hysterectomy laparoscopy; av fistula creation (6/22/2010); recovery (4/3/2012); cataract phaco with iol  "(6/20/2012); recovery (11/27/2012); recovery (7/16/2013); and recovery (8/5/2014).    SOCIAL HISTORY  Social History   Substance Use Topics   • Smoking status: Never Smoker    • Smokeless tobacco: Never Used   • Alcohol Use: No      History   Drug Use No       FAMILY HISTORY  Family History   Problem Relation Age of Onset   • Hypertension Mother        CURRENT MEDICATIONS  No current facility-administered medications on file prior to encounter.     Current Outpatient Prescriptions on File Prior to Encounter   Medication Sig Dispense Refill   • nitrofurantoin monohydr macro (MACROBID) 100 MG Cap Take 1 Cap by mouth 2 times a day. 14 Cap 0   • cefdinir (OMNICEF) 300 MG Cap Take 1 Cap by mouth 2 times a day. 20 Cap 0   • losartan (COZAAR) 50 MG Tab Take 1 Tab by mouth every day. 90 Tab 3   • losartan (COZAAR) 25 MG Tab Take 25 mg by mouth every day.     • SENSIPAR 90 MG Tab      • warfarin (COUMADIN) 4 MG Tab Take 1 Tab by mouth every day. 90 Tab 3   • metoprolol SR (TOPROL XL) 25 MG TABLET SR 24 HR Take 25 mg by mouth every day.     • B Complex-C-Folic Acid (DIALYVITE 800 PO) Take  by mouth.     • Sevelamer Carbonate 800 MG Tab Take 1 Tab by mouth 2 Times a Day.     • simvastatin (ZOCOR) 40 MG Tab Take 40 mg by mouth every evening.           ALLERGIES  Allergies   Allergen Reactions   • Nkda [No Known Drug Allergy]        PHYSICAL EXAM  VITAL SIGNS: /99 mmHg  Pulse 82  Temp(Src) 38.8 °C (101.8 °F)  Resp 16  Ht 1.575 m (5' 2\")  Wt 45.8 kg (100 lb 15.5 oz)  BMI 18.46 kg/m2  SpO2 94%  LMP 01/01/1970    Vital signs reviewed.  Constitutional:  Pleasant. No acute distress.   Head: Normocephalic/atraumatic  Mouth/Throat: Oropharynx is moist, no exudate or erythema.   Neck: Supple. No JVD  Cardiovascular: Regular rate and rhythm.   Pulmonary/Chest: Expiratory wheezing to right chest   Abdominal: Soft nontender  Musculoskeletal: Palpable thrill and fistula over right arm.   Lymphadenopathy: 1 palpable node over " left cervical chain.  Neurological: Normal D tendon reflexes. Strength and sensation intact  Skin: Warm, Dry. No rashes.   Psychiatric: Normal affect.     LABS  Results for orders placed or performed during the hospital encounter of 05/12/17   Lactic acid (lactate)   Result Value Ref Range    Lactic Acid 1.4 0.5 - 2.0 mmol/L   CBC WITH DIFFERENTIAL   Result Value Ref Range    WBC 3.3 (L) 4.8 - 10.8 K/uL    RBC 3.44 (L) 4.20 - 5.40 M/uL    Hemoglobin 10.9 (L) 12.0 - 16.0 g/dL    Hematocrit 33.7 (L) 37.0 - 47.0 %    MCV 98.0 (H) 81.4 - 97.8 fL    MCH 31.7 27.0 - 33.0 pg    MCHC 32.3 (L) 33.6 - 35.0 g/dL    RDW 56.9 (H) 35.9 - 50.0 fL    Platelet Count 71 (L) 164 - 446 K/uL    MPV 12.0 9.0 - 12.9 fL    Neutrophils-Polys 65.80 44.00 - 72.00 %    Lymphocytes 16.20 (L) 22.00 - 41.00 %    Monocytes 15.90 (H) 0.00 - 13.40 %    Eosinophils 0.60 0.00 - 6.90 %    Basophils 1.20 0.00 - 1.80 %    Immature Granulocytes 0.30 0.00 - 0.90 %    Nucleated RBC 0.00 /100 WBC    Neutrophils (Absolute) 2.15 2.00 - 7.15 K/uL    Lymphs (Absolute) 0.53 (L) 1.00 - 4.80 K/uL    Monos (Absolute) 0.52 0.00 - 0.85 K/uL    Eos (Absolute) 0.02 0.00 - 0.51 K/uL    Baso (Absolute) 0.04 0.00 - 0.12 K/uL    Immature Granulocytes (abs) 0.01 0.00 - 0.11 K/uL    NRBC (Absolute) 0.00 K/uL   COMP METABOLIC PANEL   Result Value Ref Range    Sodium 135 135 - 145 mmol/L    Potassium 4.0 3.6 - 5.5 mmol/L    Chloride 94 (L) 96 - 112 mmol/L    Co2 33 20 - 33 mmol/L    Anion Gap 8.0 0.0 - 11.9    Glucose 85 65 - 99 mg/dL    Bun 16 8 - 22 mg/dL    Creatinine 3.25 (H) 0.50 - 1.40 mg/dL    Calcium 9.1 8.5 - 10.5 mg/dL    AST(SGOT) 33 12 - 45 U/L    ALT(SGPT) 13 2 - 50 U/L    Alkaline Phosphatase 132 (H) 30 - 99 U/L    Total Bilirubin 1.0 0.1 - 1.5 mg/dL    Albumin 3.7 3.2 - 4.9 g/dL    Total Protein 7.1 6.0 - 8.2 g/dL    Globulin 3.4 1.9 - 3.5 g/dL    A-G Ratio 1.1 g/dL   URINALYSIS   Result Value Ref Range    Micro Urine Req Microscopic     Color Yellow      Character Clear     Specific Gravity 1.005 <1.035    Ph 8.0 5.0-8.0    Glucose Negative Negative mg/dL    Ketones Negative Negative mg/dL    Protein >=500 (A) Negative mg/dL    Bilirubin Negative Negative    Nitrite Negative Negative    Leukocyte Esterase Negative Negative    Occult Blood Negative Negative   ESTIMATED GFR   Result Value Ref Range    GFR If  16 (A) >60 mL/min/1.73 m 2    GFR If Non  14 (A) >60 mL/min/1.73 m 2   URINE MICROSCOPIC (W/UA)   Result Value Ref Range    WBC 0-2 /hpf    RBC 0-2 /hpf    Mucous Threads Few /hpf    Amorphous Crystal Present /hpf   PROTHROMBIN TIME   Result Value Ref Range    PT 14.3 12.0 - 14.6 sec    INR 1.08 0.87 - 1.13       All labs reviewed by me.    RADIOLOGY  DX-CHEST-PORTABLE (1 VIEW)   Final Result      1.  Interstitial densities which could be edema or fibrosis      2.  Enlarged cardiac silhouette      3.  Aortic atherosclerotic plaque         DX-CHEST-PORTABLE (1 VIEW)    (Results Pending)     The radiologist's interpretation of all radiological studies have been reviewed by me.    COURSE & MEDICAL DECISION MAKING  Pertinent Labs & Imaging studies reviewed. (See chart for details) The patient's [facility] and past medical  records were reviewed    6:27 PM - Patient seen and examined at bedside.  Ordered DX-chest, lactic acid, CBC with differential, CMP, Urinalysis, Urine culture, blood culture to evaluate her symptoms. The differential diagnoses include but are not limited to: pneumonia vs UTI vs sepsis. Patient's lactic acid is negative. The chest x-ray showed questionable fibrosis. I suspect given her cough and fever this is likely secondary to pneumonia. Patient will be started on antibiotics and admitted to the hospital.    7:23 PM Paged [facility].    7:27 PM - I discussed the patient's case and the above findings with Dr. Guzman (Hospitalist) who agrees to admit the patient.      DISPOSITION:  Patient will be  admitted to Dr. Guzman (Hospitalist) in guarded condition.                FINAL IMPRESSION  1. Pneumonia due to infectious organism, unspecified laterality, unspecified part of lung          I, Destiny Smallwood (Scribe), am scribing for, and in the presence of, Oscar Recinos M.D..    Electronically signed by: Destiny Smallwood (Scribe), 5/12/2017    IOscar M.D. personally performed the services described in this documentation, as scribed by Destiny Smallwood in my presence, and it is both accurate and complete.    The note accurately reflects work and decisions made by me.  Oscar Recinos  5/12/2017  8:41 PM

## 2017-05-13 NOTE — RESPIRATORY CARE
COPD EDUCATION by COPD CLINICAL EDUCATOR  5/13/2017 at 6:43 AM by Whitley Bartholomew     Patient reviewed by COPD education team. Patient does not qualify for COPD program.

## 2017-05-13 NOTE — PROGRESS NOTES
2 RN skin assessment done with DyVyang both ears, elbows, under axillary areas, heels and trochanter intact. Abdomen, trunc and sacrum intact with dry flaky skin.

## 2017-05-13 NOTE — ED NOTES
Report received from RN. Assumed care of pt. Mini cath performed, pt tolerated well. Urine sample collected and sent to lab.

## 2017-05-13 NOTE — PROGRESS NOTES
Inpatient Anticoagulation Service Note    Date: 5/13/2017  Reason for Anticoagulation: Deep Vein Thrombosis        Hemoglobin Value: 10.6  Hematocrit Value: 34.1  Lab Platelet Value: 57  Target INR: 2.0 to 3.0    INR from last 7 days     Date/Time INR Value    05/13/17 0758 (!)1.25    05/12/17 1825 1.08        Dose from last 7 days     Date/Time Dose (mg)    05/13/17 1100 4    05/12/17 2300 4        Average Dose (mg):  (4 mg - concerned for noncompliance)  Significant Interactions: Antibiotics, Statin  Bridge Therapy: No      Reversal Agent Administered: Not Applicable  Comments: 82 yo female admitted for acute respiratory failure with hypoxia.  INR upon admission was near baseline. Patient compliance in question. H/H are low but stable. Pt has thrombocytopenia, with plts trending down since the beginning of this year per our records and with previous history as well.  No active bleeding at this time. Patient has history of multiple blood clots and warfarin therapy has been resumed.  INR is sub-therapeutic with upward trend.  Although home dose is warfarin 4mg po qday, pt may not be compliant and require a lower daily dose.  Pt has also been started on antibiotics which may increase the INR as well.  Will give one more dose of warfarin 4mg po tonight and continue to trend INRs.  If significant increase over night, consider decreasing future dosing.     Plan: Give one more dose of warfarin 4mg po tonight and continue to trend INRs.  If significant increase over night, consider decreasing future dosing.     Education Material Provided?: No (chronic warfarin patient)  Pharmacist suggested discharge dosing: Cautiously consider warfarin 4mg po qday with follow up INR within 24 to 48 hours of discharge.      Thalia Diaz Pharm.D.

## 2017-05-13 NOTE — PROGRESS NOTES
Difficulty obtaining IV; 22 G inserted around 0300 by SONAL Waldron RN; Patient resting comfortably in bed.  MS: SB SR: No Ectopy  Rate 57 to 73  .20/.10/.40

## 2017-05-14 PROBLEM — R55 VASOVAGAL SYNCOPE: Status: ACTIVE | Noted: 2017-05-14

## 2017-05-14 LAB
ANION GAP SERPL CALC-SCNC: 10 MMOL/L (ref 0–11.9)
ANISOCYTOSIS BLD QL SMEAR: ABNORMAL
BACTERIA UR CULT: NORMAL
BASE EXCESS BLDA CALC-SCNC: 2 MMOL/L (ref -4–3)
BASOPHILS # BLD AUTO: 0 % (ref 0–1.8)
BASOPHILS # BLD: 0 K/UL (ref 0–0.12)
BODY TEMPERATURE: ABNORMAL CENTIGRADE
BUN SERPL-MCNC: 42 MG/DL (ref 8–22)
BURR CELLS BLD QL SMEAR: NORMAL
CALCIUM SERPL-MCNC: 9 MG/DL (ref 8.5–10.5)
CHLORIDE SERPL-SCNC: 98 MMOL/L (ref 96–112)
CO2 SERPL-SCNC: 30 MMOL/L (ref 20–33)
CREAT SERPL-MCNC: 5.85 MG/DL (ref 0.5–1.4)
CRP SERPL HS-MCNC: 4.53 MG/DL (ref 0–0.75)
EKG IMPRESSION: NORMAL
EOSINOPHIL # BLD AUTO: 0 K/UL (ref 0–0.51)
EOSINOPHIL NFR BLD: 0 % (ref 0–6.9)
ERYTHROCYTE [DISTWIDTH] IN BLOOD BY AUTOMATED COUNT: 59 FL (ref 35.9–50)
GFR SERPL CREATININE-BSD FRML MDRD: 7 ML/MIN/1.73 M 2
GLUCOSE BLD-MCNC: 114 MG/DL (ref 65–99)
GLUCOSE SERPL-MCNC: 104 MG/DL (ref 65–99)
HCO3 BLDA-SCNC: 29 MMOL/L (ref 17–25)
HCT VFR BLD AUTO: 37.3 % (ref 37–47)
HGB BLD-MCNC: 11.9 G/DL (ref 12–16)
INR PPP: 1.3 (ref 0.87–1.13)
LG PLATELETS BLD QL SMEAR: NORMAL
LYMPHOCYTES # BLD AUTO: 0.68 K/UL (ref 1–4.8)
LYMPHOCYTES NFR BLD: 11.4 % (ref 22–41)
MACROCYTES BLD QL SMEAR: ABNORMAL
MANUAL DIFF BLD: ABNORMAL
MCH RBC QN AUTO: 31.8 PG (ref 27–33)
MCHC RBC AUTO-ENTMCNC: 31.9 G/DL (ref 33.6–35)
MCV RBC AUTO: 99.7 FL (ref 81.4–97.8)
MONOCYTES # BLD AUTO: 0.47 K/UL (ref 0–0.85)
MONOCYTES NFR BLD AUTO: 7.9 % (ref 0–13.4)
MORPHOLOGY BLD-IMP: NORMAL
NEUTROPHILS # BLD AUTO: 4.84 K/UL (ref 2–7.15)
NEUTROPHILS NFR BLD: 64 % (ref 44–72)
NEUTS BAND NFR BLD MANUAL: 16.7 % (ref 0–10)
NRBC # BLD AUTO: 0 K/UL
NRBC BLD AUTO-RTO: 0 /100 WBC
OVALOCYTES BLD QL SMEAR: NORMAL
PCO2 BLDA: 57.4 MMHG (ref 26–37)
PH BLDA: 7.33 [PH] (ref 7.4–7.5)
PLATELET # BLD AUTO: 58 K/UL (ref 164–446)
PLATELET BLD QL SMEAR: NORMAL
PMV BLD AUTO: 13.3 FL (ref 9–12.9)
PO2 BLDA: 40.5 MMHG (ref 64–87)
POIKILOCYTOSIS BLD QL SMEAR: NORMAL
POTASSIUM SERPL-SCNC: 4.8 MMOL/L (ref 3.6–5.5)
PROTHROMBIN TIME: 16.6 SEC (ref 12–14.6)
RBC # BLD AUTO: 3.74 M/UL (ref 4.2–5.4)
RBC BLD AUTO: PRESENT
SAO2 % BLDA: 67.5 % (ref 93–99)
SIGNIFICANT IND 70042: NORMAL
SITE SITE: NORMAL
SODIUM SERPL-SCNC: 138 MMOL/L (ref 135–145)
SOURCE SOURCE: NORMAL
WBC # BLD AUTO: 6 K/UL (ref 4.8–10.8)

## 2017-05-14 PROCEDURE — 80048 BASIC METABOLIC PNL TOTAL CA: CPT

## 2017-05-14 PROCEDURE — A9270 NON-COVERED ITEM OR SERVICE: HCPCS

## 2017-05-14 PROCEDURE — 85610 PROTHROMBIN TIME: CPT

## 2017-05-14 PROCEDURE — 82962 GLUCOSE BLOOD TEST: CPT

## 2017-05-14 PROCEDURE — 99233 SBSQ HOSP IP/OBS HIGH 50: CPT | Performed by: INTERNAL MEDICINE

## 2017-05-14 PROCEDURE — 700105 HCHG RX REV CODE 258: Performed by: INTERNAL MEDICINE

## 2017-05-14 PROCEDURE — A9270 NON-COVERED ITEM OR SERVICE: HCPCS | Performed by: INTERNAL MEDICINE

## 2017-05-14 PROCEDURE — 700102 HCHG RX REV CODE 250 W/ 637 OVERRIDE(OP): Performed by: HOSPITALIST

## 2017-05-14 PROCEDURE — 93010 ELECTROCARDIOGRAM REPORT: CPT | Performed by: INTERNAL MEDICINE

## 2017-05-14 PROCEDURE — 700102 HCHG RX REV CODE 250 W/ 637 OVERRIDE(OP)

## 2017-05-14 PROCEDURE — 85007 BL SMEAR W/DIFF WBC COUNT: CPT

## 2017-05-14 PROCEDURE — A9270 NON-COVERED ITEM OR SERVICE: HCPCS | Performed by: HOSPITALIST

## 2017-05-14 PROCEDURE — 36415 COLL VENOUS BLD VENIPUNCTURE: CPT

## 2017-05-14 PROCEDURE — 85027 COMPLETE CBC AUTOMATED: CPT

## 2017-05-14 PROCEDURE — 700105 HCHG RX REV CODE 258: Performed by: HOSPITALIST

## 2017-05-14 PROCEDURE — 700111 HCHG RX REV CODE 636 W/ 250 OVERRIDE (IP): Performed by: HOSPITALIST

## 2017-05-14 PROCEDURE — 93005 ELECTROCARDIOGRAM TRACING: CPT | Performed by: NURSE PRACTITIONER

## 2017-05-14 PROCEDURE — 770020 HCHG ROOM/CARE - TELE (206)

## 2017-05-14 PROCEDURE — 82803 BLOOD GASES ANY COMBINATION: CPT

## 2017-05-14 PROCEDURE — 86140 C-REACTIVE PROTEIN: CPT

## 2017-05-14 PROCEDURE — 700102 HCHG RX REV CODE 250 W/ 637 OVERRIDE(OP): Performed by: INTERNAL MEDICINE

## 2017-05-14 RX ORDER — OSELTAMIVIR PHOSPHATE 30 MG/1
30 CAPSULE ORAL
Status: DISCONTINUED | OUTPATIENT
Start: 2017-05-15 | End: 2017-05-17 | Stop reason: HOSPADM

## 2017-05-14 RX ORDER — SODIUM CHLORIDE 9 MG/ML
250 INJECTION, SOLUTION INTRAVENOUS ONCE
Status: COMPLETED | OUTPATIENT
Start: 2017-05-14 | End: 2017-05-14

## 2017-05-14 RX ORDER — SODIUM CHLORIDE 9 MG/ML
INJECTION, SOLUTION INTRAVENOUS
Status: ACTIVE
Start: 2017-05-14 | End: 2017-05-14

## 2017-05-14 RX ADMIN — RENAGEL 800 MG: 800 TABLET ORAL at 09:23

## 2017-05-14 RX ADMIN — ONDANSETRON 4 MG: 2 INJECTION INTRAMUSCULAR; INTRAVENOUS at 03:23

## 2017-05-14 RX ADMIN — WARFARIN SODIUM 4 MG: 4 TABLET ORAL at 17:26

## 2017-05-14 RX ADMIN — BENZOCAINE: 200 SPRAY DENTAL; ORAL; PERIODONTAL at 15:17

## 2017-05-14 RX ADMIN — ACETAMINOPHEN 650 MG: 325 TABLET, FILM COATED ORAL at 15:24

## 2017-05-14 RX ADMIN — RENAGEL 800 MG: 800 TABLET ORAL at 18:25

## 2017-05-14 RX ADMIN — AZITHROMYCIN 250 MG: 250 TABLET, FILM COATED ORAL at 21:37

## 2017-05-14 RX ADMIN — METOPROLOL SUCCINATE 25 MG: 25 TABLET, EXTENDED RELEASE ORAL at 21:37

## 2017-05-14 RX ADMIN — AMPICILLIN SODIUM AND SULBACTAM SODIUM 3 G: 2; 1 INJECTION, POWDER, FOR SOLUTION INTRAMUSCULAR; INTRAVENOUS at 09:24

## 2017-05-14 RX ADMIN — SIMVASTATIN 40 MG: 40 TABLET, FILM COATED ORAL at 21:37

## 2017-05-14 RX ADMIN — SODIUM CHLORIDE 250 ML: 9 INJECTION, SOLUTION INTRAVENOUS at 09:24

## 2017-05-14 RX ADMIN — OSELTAMIVIR PHOSPHATE 30 MG: 30 CAPSULE ORAL at 09:23

## 2017-05-14 RX ADMIN — BENZOCAINE AND MENTHOL 1 LOZENGE: 15; 3.6 LOZENGE ORAL at 15:17

## 2017-05-14 ASSESSMENT — ENCOUNTER SYMPTOMS
WHEEZING: 0
HEADACHES: 0
ABDOMINAL PAIN: 0
NAUSEA: 0
ORTHOPNEA: 0
SENSORY CHANGE: 0
PALPITATIONS: 0
CHILLS: 0
COUGH: 0
VOMITING: 0
SORE THROAT: 1
FOCAL WEAKNESS: 0
DIARRHEA: 1
SHORTNESS OF BREATH: 0
DIZZINESS: 0
MYALGIAS: 0
EYES NEGATIVE: 1
FEVER: 0
BACK PAIN: 0

## 2017-05-14 ASSESSMENT — PAIN SCALES - GENERAL
PAINLEVEL_OUTOF10: 0
PAINLEVEL_OUTOF10: 4
PAINLEVEL_OUTOF10: 0
PAINLEVEL_OUTOF10: 3
PAINLEVEL_OUTOF10: 0

## 2017-05-14 NOTE — CARE PLAN
Problem: Safety  Goal: Will remain free from injury  Intervention: Collaborate with Interdisciplinary Team for safe transfer and mobilization techniques  Educated patient on use of call light, no slip socks on, bed lowest position. All needs attended to. Patient verbalized understanding.           Problem: Infection  Goal: Will remain free from infection  Intervention: Implement standard precautions and perform hand washing before and after patient contact  Educated patient about hand hygiene. Verbalized understanding and can demonstrate effectively. Will continue to education patient about infection precautions and monitor for signs and symptoms of infection.

## 2017-05-14 NOTE — PROGRESS NOTES
CNA calls stating he needs help pt is not acting like herself.  RN walked into room, pt slumped over on BSC.  RN could hear pt breathing but not responding.  Rapid response called.  /76 HR 93 O2 stat 94% 4 L nc.  Monitor room calls stating HR went to 50's but then has gone back up to 90's.  Sternal rub applied, after continued sternal rubs pt answered that she was in a hosptial but did not stay awake and alert.  Two staff members carried pt back to bed.  Once in bed for approximately 5 mins pt back to baseline, responsive to voice, answering questions oriented.  FSBS 114.  Samantha NOYOLA paged, EKG and labs ordered.  Pt denies CP, SOB, or feeling dizzy now while in bed but does c/o of nausea will give Zofran per orders.

## 2017-05-14 NOTE — CONSULTS
DATE OF SERVICE:  05/13/2017    NEPHROLOGY CONSULTATION    DATE OF SERVICE:  05/13/2017    REQUESTING PHYSICIAN:  Sathya Ronquillo MD    REASON FOR CONSULTATION:  To evaluate and provide dialysis for patient with   end-stage renal disease.    HISTORY OF PRESENT ILLNESS:  The patient is a very pleasant 81-year-old female   with end-stage renal disease, on hemodialysis, who has been receiving her   dialysis treatments in [facility] on Monday, Wednesday, Friday,   admitted to the hospital with complaints of 2 days fatigue, mild fever,   chills, productive cough with yellow sputum and worsening sore throat.  She   had sick contacts as her grandchildren had sore throat, nausea and vomiting.    Currently, patient complains of severe sore throat, can barely talk, also   painful swallowing and shortness of breath, improving.  She is still   complaining of productive cough.  No abdominal pain, no nausea or vomiting, no   diarrhea.    REVIEW OF SYSTEMS:  All 12 points reviewed and all negative except history of   present illness per CMS/AMA criteria.    PAST MEDICAL HISTORY:  End-stage renal disease, on hemodialysis Monday,   Wednesday, and Friday; hypertension; hyperlipidemia; deep venous thrombosis;   coronary artery disease; glaucoma; hypothyroidism; pulmonary hypertension.    PAST SURGICAL HISTORY:  Tonsillectomy, hysterectomy, fistula creation,   cataract surgeries.    SOCIAL HISTORY:  No tobacco, alcohol or drug use.  Lives with family.    FAMILY HISTORY:  Hypertension.    ALLERGIES:  No known drug allergies.    OUTPATIENT MEDICATIONS:  Cozaar, Sensipar, Coumadin, metoprolol, Renagel,   Zocor.    PHYSICAL EXAMINATION:  VITAL SIGNS:  Blood pressure 153/53, heart rate 90, temperature 37.2 Celsius.  GENERAL APPEARANCE:  Well-developed, thin female, in no acute distress.  HEENT:  Normocephalic, atraumatic.  Pupils equal, round, reactive to light.    Extraocular movement intact.  Nares patent.  Oropharynx; slightly  dry mucosa,   erythema.  NECK:  Supple.  No increase of jugular venous pressure, no lymphadenopathy, no   thyromegaly.  LUNGS:  Coarse breath sounds.  No wheezes or rhonchi.  HEART:  Regular rate.  No rub or gallop.  ABDOMEN:  Soft, nontender, nondistended.  Bowel sounds present.  EXTREMITIES:  No cyanosis, no clubbing, no edema.  NEUROLOGIC:  Alert and oriented x3.  No focal deficit.  Cranial nerves II-XII   grossly intact.    LABORATORY RESULTS:  Hemoglobin level 10.6, platelets 57, sodium 138,   potassium 4.7, BUN 25 and creatinine 4.21.    ASSESSMENT AND PLAN:  The patient is a very pleasant 81-year-old female with   end-stage renal disease, on hemodialysis, admitted with upper respiratory   tract infection, sore throat.  1.  End-stage renal disease.  We will continue with dialysis per patient's   schedule Monday, Wednesday, Friday.  2.  Electrolytes remains well controlled, to monitor.  3.  Anemia.  Hemoglobin level at goal, to monitor.  4.  Volume overloaded.    RECOMMENDATIONS:  Renal diet.  Hemodialysis Monday, Wednesday, Friday.    Monitor basic metabolic panel.    Thank you for the consult.       ____________________________________     MD RODRICK ALMANZAR / LASHAY    DD:  05/13/2017 14:52:19  DT:  05/13/2017 18:41:12    D#:  [id number]  Job#:  [id number]

## 2017-05-14 NOTE — PROGRESS NOTES
Hospital Medicine Interval Note  Date of Service:  5/14/2017    Chief Complaint  81/F with HTN, HLD ESRD on HD MWF, h/o recurrent DVT, on AC, Hypothyroidism, admitted for fevers, cough productive of yellow phlegm, and chills with malaise and fatigue. CXR showed interstitial densities. WBC 3.3. Crea 3.25, with normal K, Na. Lactate 1.4. UA unimpressive. Requiring O2. Felt to have CAP. Started on antibiotics.  Influenza B positive. No labs this morning. CXR showed worsening left basilar opacity could relate to worsening atelectasis or developing pneumonia.        Interval Problem Update  5/14/2017 - Had unresponsiveness, bradycardia while on commode this morning.  BP soft. Afebrile. Remains on 4L O2 NC. No further fevers. No leukocytosis. Hgb stable. Low platelets stable. Na, K WNL. CRP 4.53.    > Seen and examined. Reports feeling weak. No cough, N/V or abd pain. Does not use home O2. States she's breathing better.       Consultants/Specialty  Nephrology    Disposition  Monitor on telemetry.       ROS     Pertinent positives/negatives as mentioned above.     A complete review of systems was done. All other systems were negative.      Physical Exam Laboratory/Imaging   Filed Vitals:    05/14/17 0308 05/14/17 0315 05/14/17 0320 05/14/17 0400   BP: 124/15 131/54 112/45 113/45   Pulse: 60 61 63 70   Temp:  36.3 °C (97.4 °F)  36.2 °C (97.1 °F)   Resp:  20 20 20   Height:       Weight:       SpO2: 100% 99% 97% 96%     Physical Exam   Constitutional: She is oriented to person, place, and time. She appears well-developed and well-nourished. No distress.   Weak looking   HENT:   Head: Normocephalic and atraumatic.   Mouth/Throat: No oropharyngeal exudate.   Eyes: Conjunctivae are normal. Pupils are equal, round, and reactive to light. No scleral icterus.   Neck: Normal range of motion. Neck supple.   Cardiovascular: Normal rate and regular rhythm.  Exam reveals no gallop and no friction rub.    No murmur  heard.  Pulmonary/Chest: Effort normal. No respiratory distress. She has no wheezes. She has no rales. She exhibits no tenderness.   Diminished air entry, otherwise CTA.    Abdominal: Soft. Bowel sounds are normal. She exhibits no distension. There is no tenderness. There is no rebound and no guarding.   Musculoskeletal: Normal range of motion. She exhibits no edema or tenderness.   Lymphadenopathy:     She has no cervical adenopathy.   Neurological: She is alert and oriented to person, place, and time. No cranial nerve deficit.   Skin: Skin is warm and dry. No rash noted. No erythema. No pallor.   Psychiatric: Her behavior is normal. Judgment and thought content normal.   Flat affect   Nursing note and vitals reviewed.        Lab Results   Component Value Date/Time    WBC 6.0 05/14/2017 02:57 AM    HEMOGLOBIN 11.9* 05/14/2017 02:57 AM    HEMATOCRIT 37.3 05/14/2017 02:57 AM    PLATELET COUNT 58* 05/14/2017 02:57 AM     Lab Results   Component Value Date/Time    SODIUM 138 05/14/2017 02:57 AM    POTASSIUM 4.8 05/14/2017 02:57 AM    CHLORIDE 98 05/14/2017 02:57 AM    CO2 30 05/14/2017 02:57 AM    GLUCOSE 104* 05/14/2017 02:57 AM    BUN 42* 05/14/2017 02:57 AM    CREATININE 5.85* 05/14/2017 02:57 AM      Assessment/Plan    Sepsis due to influenza B, postinfluenza bacterial pneumonia (CMS-HCC) (present on admission)  Assessment & Plan  - Continue tamiflu. As CRP elevated, will continue IV unasyn and azithromycin for now to cover for possible concomitant pneumonia. Continue to trend CRP, and consider stopping antibiotics once CRP normalized. Continue to trend WBC.   - continue close hemodynamic monitoring. Will defer IVF as dialysis dependent. Continue telemetry.     Acute respiratory failure with hypoxia due to influenza B, not due to sepsis (CMS-HCC) (present on admission)  Assessment & Plan  - continue tamiflu as above. Continue RT protocol, and O2 to keep sats>88%.     Vasovagal syncope  Assessment & Plan  - while in  a commode, having a BM.  - monitor on telemetry.     History of DVT of lower extremity (present on admission)  Assessment & Plan  - Continue on Coumadin.    End stage renal disease (CMS-HCC) (present on admission)  Assessment & Plan  - nephrology on-board. Continue HD while she's in-house.   - continue home sevelamer.    Anemia of chronic disease (present on admission)  Assessment & Plan  - stable. Monitor Hgb. Transfuse for Hgb<7.     Essential hypertension (present on admission)  Assessment & Plan  - Continue metoprolol and Cozaar.    HLD (hyperlipidemia) (present on admission)  Assessment & Plan  - Continue Zocor.     Chronic thrombocytopenia (CMS-HCC) (present on admission)  Assessment & Plan  - stable. Continue to monitor, CBC in AM.     Hemorrhagic disorder due to extrinsic circulating anticoagulants (CMS-[facility]) (present on admission)  Assessment & Plan  - for h/o recurrent DVT. Continue coumadin per pharmacy dosing.        Labs reviewed, Medications reviewed and Radiology images reviewed  Morataya catheter: No Morataya      DVT Prophylaxis: Warfarin (Coumadin)    Ulcer prophylaxis: Not indicated  Antibiotics: Treating active infection/contamination beyond 24 hours perioperative coverage  Assessed for rehab: Patient was assess for and/or received rehabilitation services during this hospitalization

## 2017-05-14 NOTE — PROGRESS NOTES
Inpatient Anticoagulation Service Note  Date: 5/14/2017  Estimated Creatinine Clearance: 5.6 mL/min (by C-G formula based on Cr of 5.85).  Reason for Anticoagulation: Deep Vein Thrombosis   Hemoglobin Value: 11.9  Hematocrit Value: 37.3  Lab Platelet Value: 58  Target INR: 2.0 to 3.0  INR from last 7 days     Date/Time INR Value    05/14/17 0257 (!)1.3    05/13/17 0758 (!)1.25    05/12/17 1825 1.08        Dose from last 7 days     Date/Time Dose (mg)    05/14/17 1100 4    05/13/17 1100 4    05/12/17 2300 4        Average Dose (mg): TBD (Home Dose: 4 mg daily per chart review, compliance concerns)  Significant Interactions: Antibiotics, NSAID, Statin  Bridge Therapy: No  Reversal Agent Administered: Not Applicable  Comments: INR up slightly overnight. Home dose noted per chart review, compliance concerns also noted prior to admission. Continued warfarin interactions. H/H low/improved, PLT low (appears chronic). No overt bleeding noted. Ibuprofen located per MAR review, advise against use given ESRD w/iHD and thrombocytopenia. Will give 4 mg today and trend INR with AM labs. Likely to escalate dose if INR continues to stagnate.    Plan:  Warfarin 4 mg 5/14/17  Education Material Provided?: No  Pharmacist suggested discharge dosing: warfarin 4 mg daily (pending clinical disposition; recommend INR 24-48 hours post discharge)    Kevin De Jesus, PHARMD

## 2017-05-14 NOTE — CARE PLAN
Problem: Safety  Goal: Will remain free from injury  Fall precaution in place, mobility assessed and signs placed outside pt door.  Bed alarm turned on.  Reminded pt to call for assistance when needed.  Pt verbalizes understanding.  Bed in the lowest locked position. Call light within reach.

## 2017-05-14 NOTE — PROGRESS NOTES
Nephrology Progress Note, Adult, Complex               Author: Deonna Escobarsebastiánerika Date & Time created: 5/14/2017  1:54 PM     Interval History:  82 y/o female with ESRD/HD  Admitted with flu and PNA  Doing better  Still c/o sore throat    Review of Systems:  Review of Systems   Constitutional: Positive for malaise/fatigue. Negative for fever and chills.   HENT: Positive for congestion and sore throat. Negative for nosebleeds.    Eyes: Negative.    Respiratory: Negative for cough, shortness of breath and wheezing.    Cardiovascular: Negative for chest pain, palpitations, orthopnea and leg swelling.   Gastrointestinal: Positive for diarrhea. Negative for nausea, vomiting and abdominal pain.   Genitourinary: Negative for dysuria.   Musculoskeletal: Positive for joint pain. Negative for myalgias and back pain.   Skin: Negative.    Neurological: Negative for dizziness, sensory change, focal weakness and headaches.       Physical Exam:  Physical Exam   Constitutional: She is oriented to person, place, and time. No distress.   cachectic female   HENT:   Head: Normocephalic and atraumatic.   Mouth/Throat: Oropharynx is clear and moist. No oropharyngeal exudate.   Eyes: Conjunctivae and EOM are normal. Pupils are equal, round, and reactive to light. No scleral icterus.   Neck: Normal range of motion. Neck supple. No thyromegaly present.   Cardiovascular: Normal rate and regular rhythm.  Exam reveals no gallop and no friction rub.    Pulmonary/Chest: She has no wheezes. She has no rales.   Coarse BS   Abdominal: Soft. Bowel sounds are normal. She exhibits no distension. There is no tenderness.   Musculoskeletal: She exhibits no edema.   Lymphadenopathy:     She has no cervical adenopathy.   Neurological: She is alert and oriented to person, place, and time. No cranial nerve deficit.   Skin: Skin is warm. No rash noted. No erythema.       Labs:  Recent Labs      05/14/17   0350   [id number]  7.33*   [id number]  57.4*   [id number]  40.5*    [id number]  67.5*   ARTHCO3  29*   ARTBE  2         Recent Labs      17   SODIUM  135  138  138   POTASSIUM  4.0  4.7  4.8   CHLORIDE  94*  97  98   CO2  33  30  30   BUN  16  25*  42*   CREATININE  3.25*  4.21*  5.85*   CALCIUM  9.1  8.6  9.0     Recent Labs      17   ALTSGPT  13   --    --    ASTSGOT  33   --    --    ALKPHOSPHAT  132*   --    --    TBILIRUBIN  1.0   --    --    GLUCOSE  85  82  104*     Recent Labs      17   RBC  3.44*  3.42*  3.74*   HEMOGLOBIN  10.9*  10.6*  11.9*   HEMATOCRIT  33.7*  34.1*  37.3   PLATELETCT  71*  57*  58*   PROTHROMBTM  14.3  16.1*  16.6*   INR  1.08  1.25*  1.30*     Recent Labs      17   WBC  3.3*  3.9*  6.0   NEUTSPOLYS  65.80   --   64.00   LYMPHOCYTES  16.20*   --   11.40*   MONOCYTES  15.90*   --   7.90   EOSINOPHILS  0.60   --   0.00   BASOPHILS  1.20   --   0.00   ASTSGOT  33   --    --    ALTSGPT  13   --    --    ALKPHOSPHAT  132*   --    --    TBILIRUBIN  1.0   --    --            Hemodynamics:  Temp (24hrs), Av.4 °C (97.5 °F), Min:36.2 °C (97.1 °F), Max:36.9 °C (98.5 °F)  Temperature: 36.4 °C (97.6 °F)  Pulse  Av.1  Min: 58  Max: 94   Blood Pressure : 113/43 mmHg     Respiratory:    Respiration: 18, Pulse Oximetry: 93 %        RUL Breath Sounds: Crackles, RML Breath Sounds: Crackles, RLL Breath Sounds: Diminished, MECCA Breath Sounds: Crackles, LLL Breath Sounds: Diminished  Fluids:    Intake/Output Summary (Last 24 hours) at 17 1354  Last data filed at 17 0900   Gross per 24 hour   Intake   1260 ml   Output      0 ml   Net   1260 ml     Weight: 47 kg (103 lb 9.9 oz)  GI/Nutrition:  Orders Placed This Encounter   Procedures   • Diet Order     Standing Status: Standing      Number of Occurrences: 1      Standing Expiration Date:      Order Specific Question:   Diet:     Answer:  Renal [8]     Medical Decision Making, by Problem:  Active Hospital Problems    Diagnosis   • Sepsis due to influenza B, postinfluenza bacterial pneumonia (CMS-HCC) [A41.9]   • Vasovagal syncope [R55]   • Acute respiratory failure with hypoxia due to influenza B, not due to sepsis (CMS-HCC) [J96.01]   • HLD (hyperlipidemia) [E78.5]   • Chronic thrombocytopenia (CMS-HCC) [D69.6]   • Hemorrhagic disorder due to extrinsic circulating anticoagulants (CMS-HCC) [D68.32]   • Essential hypertension [I10]   • End stage renal disease (CMS-HCC) [N18.6]   • Anemia of chronic disease [D63.8]   • History of DVT of lower extremity [Z86.718]       Labs reviewed, Medications reviewed and Radiology images reviewed                      Assessment and Plan    1.ESRD/HD -MWF  2.HTN: BP well controlled  3.Electrolytes: well controlled.  4.Anemia: Hb at goal  5.PNA  6.Volume:well controlled    Diet: HD MWF          All meds to renal doses

## 2017-05-14 NOTE — PROGRESS NOTES
Call received by RN to discuss patient became unresponsive on the commode, had bradycardia during the event on tele. Returned to bed and regained consciousness. VSS, no hypoxia/hypotension, FSBS normal. Patient is now alert and oriented, VSS, afebrile.     Plan:   -CBC, BMP, ABG, EKG pending  -Continue to monitor    Orders given as appropriate, see order summary.     Rounding MD/APN to reassess in AM.  RN to call with changes.

## 2017-05-14 NOTE — PROGRESS NOTES
Pt is A&Ox4.  Family is at bedside. VSS.  Denies pain.  Two assist pivot to BSC, incontinent at times. Calls appropriately.  Pt updated on POC, needs met and questions answered.  No medical changes from previous assessment noted at this time.  Telemonitor in place. Call light within reach and working properly.

## 2017-05-14 NOTE — PROGRESS NOTES
Piper from Lab called with critical result of CO2 57.4 O2 40.5 at 408. Critical lab result read back to Piper.   Samantha NOYOLA notified of critical lab result at 0410.  Critical lab result read back by Dr. Samantha NOYOLA.  No orders received at this time d/t blood sample from venous sample per Samantha NOYOLA.

## 2017-05-14 NOTE — PROGRESS NOTES
Rounding on pt, pt sleeping, visible rise and fall of chest, fall precautions in place, no signs and symptoms of pain or distress.  Repositioned pt.  Needs met.  Call light within reach.

## 2017-05-15 PROBLEM — J45.901 ASTHMA EXACERBATION: Status: ACTIVE | Noted: 2017-05-15

## 2017-05-15 LAB
ANION GAP SERPL CALC-SCNC: 13 MMOL/L (ref 0–11.9)
ANISOCYTOSIS BLD QL SMEAR: ABNORMAL
BACTERIA SPEC RESP CULT: NORMAL
BASE EXCESS BLDA CALC-SCNC: 3 MMOL/L (ref -4–3)
BASOPHILS # BLD AUTO: 0 % (ref 0–1.8)
BASOPHILS # BLD: 0 K/UL (ref 0–0.12)
BODY TEMPERATURE: ABNORMAL CENTIGRADE
BUN SERPL-MCNC: 58 MG/DL (ref 8–22)
BURR CELLS BLD QL SMEAR: NORMAL
CALCIUM SERPL-MCNC: 8.9 MG/DL (ref 8.5–10.5)
CHLORIDE SERPL-SCNC: 94 MMOL/L (ref 96–112)
CO2 SERPL-SCNC: 29 MMOL/L (ref 20–33)
CREAT SERPL-MCNC: 6.9 MG/DL (ref 0.5–1.4)
CRP SERPL HS-MCNC: 3.77 MG/DL (ref 0–0.75)
EOSINOPHIL # BLD AUTO: 0 K/UL (ref 0–0.51)
EOSINOPHIL NFR BLD: 0 % (ref 0–6.9)
ERYTHROCYTE [DISTWIDTH] IN BLOOD BY AUTOMATED COUNT: 57.3 FL (ref 35.9–50)
GFR SERPL CREATININE-BSD FRML MDRD: 6 ML/MIN/1.73 M 2
GIANT PLATELETS BLD QL SMEAR: NORMAL
GLUCOSE SERPL-MCNC: 116 MG/DL (ref 65–99)
GRAM STN SPEC: NORMAL
HBV SURFACE AB SERPL IA-ACNC: 120.6 MIU/ML (ref 0–10)
HBV SURFACE AG SER QL: NEGATIVE
HCO3 BLDA-SCNC: 29 MMOL/L (ref 17–25)
HCT VFR BLD AUTO: 35.8 % (ref 37–47)
HGB BLD-MCNC: 11.2 G/DL (ref 12–16)
INHALED O2 FLOW RATE: 3 L/MIN (ref 2–10)
INR PPP: 2.01 (ref 0.87–1.13)
LYMPHOCYTES # BLD AUTO: 0.36 K/UL (ref 1–4.8)
LYMPHOCYTES NFR BLD: 5.2 % (ref 22–41)
MACROCYTES BLD QL SMEAR: ABNORMAL
MANUAL DIFF BLD: ABNORMAL
MCH RBC QN AUTO: 31.4 PG (ref 27–33)
MCHC RBC AUTO-ENTMCNC: 31.3 G/DL (ref 33.6–35)
MCV RBC AUTO: 100.3 FL (ref 81.4–97.8)
MONOCYTES # BLD AUTO: 0.66 K/UL (ref 0–0.85)
MONOCYTES NFR BLD AUTO: 9.6 % (ref 0–13.4)
MORPHOLOGY BLD-IMP: NORMAL
NEUTROPHILS # BLD AUTO: 5.88 K/UL (ref 2–7.15)
NEUTROPHILS NFR BLD: 73 % (ref 44–72)
NEUTS BAND NFR BLD MANUAL: 12.2 % (ref 0–10)
NRBC # BLD AUTO: 0 K/UL
NRBC BLD AUTO-RTO: 0 /100 WBC
OVALOCYTES BLD QL SMEAR: NORMAL
PCO2 BLDA: 49.1 MMHG (ref 26–37)
PH BLDA: 7.38 [PH] (ref 7.4–7.5)
PLATELET # BLD AUTO: 44 K/UL (ref 164–446)
PLATELET BLD QL SMEAR: NORMAL
PLATELETS.RETICULATED NFR BLD AUTO: 14.2 K/UL (ref 0.6–13.1)
PMV BLD AUTO: 13.3 FL (ref 9–12.9)
PO2 BLDA: 88.5 MMHG (ref 64–87)
POIKILOCYTOSIS BLD QL SMEAR: NORMAL
POTASSIUM SERPL-SCNC: 5.2 MMOL/L (ref 3.6–5.5)
PROTHROMBIN TIME: 23.4 SEC (ref 12–14.6)
RBC # BLD AUTO: 3.57 M/UL (ref 4.2–5.4)
RBC BLD AUTO: PRESENT
SAO2 % BLDA: 95.5 % (ref 93–99)
SIGNIFICANT IND 70042: NORMAL
SITE SITE: NORMAL
SODIUM SERPL-SCNC: 136 MMOL/L (ref 135–145)
SOURCE SOURCE: NORMAL
WBC # BLD AUTO: 6.9 K/UL (ref 4.8–10.8)

## 2017-05-15 PROCEDURE — 80048 BASIC METABOLIC PNL TOTAL CA: CPT

## 2017-05-15 PROCEDURE — 97165 OT EVAL LOW COMPLEX 30 MIN: CPT

## 2017-05-15 PROCEDURE — 82803 BLOOD GASES ANY COMBINATION: CPT

## 2017-05-15 PROCEDURE — 86140 C-REACTIVE PROTEIN: CPT

## 2017-05-15 PROCEDURE — 85055 RETICULATED PLATELET ASSAY: CPT

## 2017-05-15 PROCEDURE — G8987 SELF CARE CURRENT STATUS: HCPCS | Mod: CL

## 2017-05-15 PROCEDURE — A9270 NON-COVERED ITEM OR SERVICE: HCPCS | Performed by: HOSPITALIST

## 2017-05-15 PROCEDURE — A9270 NON-COVERED ITEM OR SERVICE: HCPCS | Performed by: INTERNAL MEDICINE

## 2017-05-15 PROCEDURE — 700111 HCHG RX REV CODE 636 W/ 250 OVERRIDE (IP): Performed by: HOSPITALIST

## 2017-05-15 PROCEDURE — 36415 COLL VENOUS BLD VENIPUNCTURE: CPT

## 2017-05-15 PROCEDURE — 700105 HCHG RX REV CODE 258: Performed by: HOSPITALIST

## 2017-05-15 PROCEDURE — 85027 COMPLETE CBC AUTOMATED: CPT

## 2017-05-15 PROCEDURE — 85007 BL SMEAR W/DIFF WBC COUNT: CPT

## 2017-05-15 PROCEDURE — 700102 HCHG RX REV CODE 250 W/ 637 OVERRIDE(OP): Performed by: HOSPITALIST

## 2017-05-15 PROCEDURE — 99233 SBSQ HOSP IP/OBS HIGH 50: CPT | Performed by: INTERNAL MEDICINE

## 2017-05-15 PROCEDURE — 86706 HEP B SURFACE ANTIBODY: CPT

## 2017-05-15 PROCEDURE — 90935 HEMODIALYSIS ONE EVALUATION: CPT

## 2017-05-15 PROCEDURE — 97162 PT EVAL MOD COMPLEX 30 MIN: CPT

## 2017-05-15 PROCEDURE — 700111 HCHG RX REV CODE 636 W/ 250 OVERRIDE (IP): Performed by: INTERNAL MEDICINE

## 2017-05-15 PROCEDURE — 87340 HEPATITIS B SURFACE AG IA: CPT

## 2017-05-15 PROCEDURE — G8988 SELF CARE GOAL STATUS: HCPCS | Mod: CJ

## 2017-05-15 PROCEDURE — 770020 HCHG ROOM/CARE - TELE (206)

## 2017-05-15 PROCEDURE — G8978 MOBILITY CURRENT STATUS: HCPCS | Mod: CM

## 2017-05-15 PROCEDURE — G8979 MOBILITY GOAL STATUS: HCPCS | Mod: CI

## 2017-05-15 PROCEDURE — 700105 HCHG RX REV CODE 258

## 2017-05-15 PROCEDURE — 85610 PROTHROMBIN TIME: CPT

## 2017-05-15 PROCEDURE — 5A1D00Z PERFORMANCE OF URINARY FILTRATION, SINGLE: ICD-10-PCS | Performed by: INTERNAL MEDICINE

## 2017-05-15 PROCEDURE — 700102 HCHG RX REV CODE 250 W/ 637 OVERRIDE(OP): Performed by: INTERNAL MEDICINE

## 2017-05-15 RX ORDER — WARFARIN SODIUM 2 MG/1
2 TABLET ORAL
Status: DISCONTINUED | OUTPATIENT
Start: 2017-05-16 | End: 2017-05-16

## 2017-05-15 RX ORDER — PREDNISONE 20 MG/1
40 TABLET ORAL DAILY
Status: DISCONTINUED | OUTPATIENT
Start: 2017-05-15 | End: 2017-05-17 | Stop reason: HOSPADM

## 2017-05-15 RX ORDER — SODIUM CHLORIDE 9 MG/ML
INJECTION, SOLUTION INTRAVENOUS
Status: COMPLETED
Start: 2017-05-15 | End: 2017-05-15

## 2017-05-15 RX ADMIN — RENAGEL 800 MG: 800 TABLET ORAL at 09:07

## 2017-05-15 RX ADMIN — PREDNISONE 40 MG: 20 TABLET ORAL at 14:58

## 2017-05-15 RX ADMIN — OSELTAMIVIR PHOSPHATE 30 MG: 30 CAPSULE ORAL at 20:14

## 2017-05-15 RX ADMIN — SIMVASTATIN 40 MG: 40 TABLET, FILM COATED ORAL at 20:12

## 2017-05-15 RX ADMIN — ACETAMINOPHEN 650 MG: 325 TABLET, FILM COATED ORAL at 20:12

## 2017-05-15 RX ADMIN — AMPICILLIN SODIUM AND SULBACTAM SODIUM 3 G: 2; 1 INJECTION, POWDER, FOR SOLUTION INTRAMUSCULAR; INTRAVENOUS at 09:08

## 2017-05-15 RX ADMIN — RENAGEL 800 MG: 800 TABLET ORAL at 18:30

## 2017-05-15 RX ADMIN — AZITHROMYCIN 250 MG: 250 TABLET, FILM COATED ORAL at 18:30

## 2017-05-15 RX ADMIN — SODIUM CHLORIDE 25 ML: 9 INJECTION, SOLUTION INTRAVENOUS at 09:30

## 2017-05-15 ASSESSMENT — ENCOUNTER SYMPTOMS
ABDOMINAL PAIN: 0
CHILLS: 0
FOCAL WEAKNESS: 0
SENSORY CHANGE: 0
NAUSEA: 0
COUGH: 0
FEVER: 0
DIZZINESS: 0
DIARRHEA: 1
VOMITING: 0
SHORTNESS OF BREATH: 0
ORTHOPNEA: 0

## 2017-05-15 ASSESSMENT — PAIN SCALES - GENERAL
PAINLEVEL_OUTOF10: 4
PAINLEVEL_OUTOF10: 0

## 2017-05-15 ASSESSMENT — GAIT ASSESSMENTS: GAIT LEVEL OF ASSIST: UNABLE TO PARTICIPATE

## 2017-05-15 ASSESSMENT — ACTIVITIES OF DAILY LIVING (ADL): TOILETING: INDEPENDENT

## 2017-05-15 NOTE — PROGRESS NOTES
Rounding on pt, pt sleeping, visible rise and fall of chest, fall precautions in place, no signs and symptoms of pain or distress.  Call light within reach.

## 2017-05-15 NOTE — PROGRESS NOTES
Nephrology Progress Note, Adult, Complex               Author: Keith Najjar Date & Time created: 5/15/2017  1:43 PM     Interval History:  80 y/o female with ESRD/HD  Admitted with flu and PNA  Doing better    Review of Systems:  Review of Systems   Constitutional: Positive for malaise/fatigue. Negative for fever and chills.   Respiratory: Negative for cough and shortness of breath.    Cardiovascular: Negative for chest pain, orthopnea and leg swelling.   Gastrointestinal: Positive for diarrhea. Negative for nausea, vomiting and abdominal pain.   Genitourinary: Negative for dysuria.   Neurological: Negative for dizziness, sensory change and focal weakness.       Physical Exam:  Physical Exam   Constitutional: No distress.   cachectic female   HENT:   Head: Normocephalic and atraumatic.   Mouth/Throat: Oropharynx is clear and moist. No oropharyngeal exudate.   Eyes: No scleral icterus.   Cardiovascular: Normal rate and regular rhythm.    Pulmonary/Chest: She has no wheezes. She has no rales.   Coarse BS   Abdominal: Soft.   Musculoskeletal: She exhibits no edema.   Neurological: She is alert.       Labs:  Recent Labs      05/14/17   0350   [id number]  7.33*   [id number]  57.4*   [id number]  40.5*   [id number]  67.5*   ARTHCO3  29*   ARTBE  2         Recent Labs      05/13/17   0758  05/14/17   0257  05/15/17   0212   SODIUM  138  138  136   POTASSIUM  4.7  4.8  5.2   CHLORIDE  97  98  94*   CO2  30  30  29   BUN  25*  42*  58*   CREATININE  4.21*  5.85*  6.90*   CALCIUM  8.6  9.0  8.9     Recent Labs      05/12/17   1825  05/13/17   0758  05/14/17   0257  05/15/17   0212   ALTSGPT  13   --    --    --    ASTSGOT  33   --    --    --    ALKPHOSPHAT  132*   --    --    --    TBILIRUBIN  1.0   --    --    --    GLUCOSE  85  82  104*  116*     Recent Labs      05/13/17   0758  05/14/17   0257  05/15/17   0212   RBC  3.42*  3.74*  3.57*   HEMOGLOBIN  10.6*  11.9*  11.2*   HEMATOCRIT  34.1*  37.3  35.8*   PLATELETCT  57*  58*   44*   PROTHROMBTM  16.1*  16.6*  23.4*   INR  1.25*  1.30*  2.01*     Recent Labs      17   1825  17   0758  17   0257  05/15/17   0212   WBC  3.3*  3.9*  6.0  6.9   NEUTSPOLYS  65.80   --   64.00  73.00*   LYMPHOCYTES  16.20*   --   11.40*  5.20*   MONOCYTES  15.90*   --   7.90  9.60   EOSINOPHILS  0.60   --   0.00  0.00   BASOPHILS  1.20   --   0.00  0.00   ASTSGOT  33   --    --    --    ALTSGPT  13   --    --    --    ALKPHOSPHAT  132*   --    --    --    TBILIRUBIN  1.0   --    --    --            Hemodynamics:  Temp (24hrs), Av.5 °C (97.7 °F), Min:35.9 °C (96.7 °F), Max:36.9 °C (98.4 °F)  Temperature: 36.6 °C (97.8 °F)  Pulse  Av.6  Min: 58  Max: 94   Blood Pressure : (!) 97/43 mmHg     Respiratory:    Respiration: 20, Pulse Oximetry: 94 %        RUL Breath Sounds: Diminished;Rhonchi, RML Breath Sounds: Diminished;Rhonchi, RLL Breath Sounds: Diminished;Rhonchi, MECCA Breath Sounds: Diminished;Rhonchi, LLL Breath Sounds: Diminished;Rhonchi  Fluids:    Intake/Output Summary (Last 24 hours) at 05/15/17 1343  Last data filed at 05/15/17 1200   Gross per 24 hour   Intake    565 ml   Output      0 ml   Net    565 ml     Weight: 48.626 kg (107 lb 3.2 oz)  GI/Nutrition:  Orders Placed This Encounter   Procedures   • Diet Order     Standing Status: Standing      Number of Occurrences: 1      Standing Expiration Date:      Order Specific Question:  Diet:     Answer:  Renal [8]     Medical Decision Making, by Problem:  Active Hospital Problems    Diagnosis   • Sepsis due to influenza B, postinfluenza bacterial pneumonia (CMS-HCC) [A41.9]   • Vasovagal syncope [R55]   • Acute respiratory failure with hypoxia due to influenza B, not due to sepsis (CMS-HCC) [J96.01]   • HLD (hyperlipidemia) [E78.5]   • Chronic thrombocytopenia (CMS-HCC) [D69.6]   • Hemorrhagic disorder due to extrinsic circulating anticoagulants (CMS-HCC) [D68.32]   • Essential hypertension [I10]   • End stage renal disease  (CMS-HCC) [N18.6]   • Anemia of chronic disease [D63.8]   • History of DVT of lower extremity [Z86.718]       Labs reviewed and Medications reviewed                      Assessment and Plan    1.ESRD/HD -MWF  2.HTN: BP well controlled  3.Electrolytes: well controlled.  4.Anemia: Hb at goal  5.PNA  6.Volume:well controlled    Diet: HD MWF          All meds to renal doses

## 2017-05-15 NOTE — DIETARY
"Nutrition Services: Pt seen for poor po PTA per admit screen    Pt is a, 80 yo female with dx of acute respiratory failure with hypoxia, SIRS. PMHx includes HTN, hyperlipidemia, ESRD on HD 3 x per week, DVT x 3, angina, glaucoma, hypothyroidism, pulmonary arterial HTN.     Pt is currently on a Renal diet and is receiving Boost Plus TID with meals. Pt with variable PO intake (%). Attempted to visit pt twice, pt was busy with other disciplines.     Weight: 48.6 kg (up 3 kg from admit, likely fluid/scale error related)  Height: 5'2\"  Pertinent Labs: Glucose 116, FSBS 114, BUN 58, Creatinine 6.9, GFR 7  Pertinent Medications: Coumadin, Deltasone, Pericolace, Renagel  Fluids: None noted  GI: Last BM 5/15  Skin: Intact    Plan/Recommend: Encourage PO intake. Nutrition Rep to see patient daily for meal preferences. Please document PO intake as percentage of meals consumed. RD to monitor for more consistent PO intake.       "

## 2017-05-15 NOTE — CARE PLAN
Problem: Safety  Goal: Will remain free from injury  Intervention: Provide assistance with mobility  Educated patient on use of call light, no slip socks on, bed lowest position. All needs attended to. Patient verbalized understanding.           Problem: Infection  Goal: Will remain free from infection  Intervention: Implement standard precautions and perform hand washing before and after patient contact  Educated patient about hand hygiene. Verbalized understanding and can demonstrate effectively. Will continue to education patient about infection precautions and monitor for signs and symptoms of infection.

## 2017-05-15 NOTE — PROGRESS NOTES
Morning report taken, patient sleeping/resting. Bed alarm on, in lowest position, call light in reach, appeared slightly tachypnic(RR 24) and oriented x3. Per night RN, this is no change from over night. Will continue to monitor closely for worsening signs of respiratory distress and further confusion.

## 2017-05-15 NOTE — THERAPY
"Physical Therapy Evaluation completed.   Bed Mobility:  Supine to Sit: Moderate Assist  Transfers: Sit to Stand: Moderate Assist  Gait: Level Of Assist: Unable to Participate with No Equipment Needed       Plan of Care: Will benefit from Physical Therapy 3 times per week  Discharge Recommendations: Equipment: Front-Wheel Walker. Post-acute therapy Discharge to a transitional care facility for continued skilled therapy services.    See \"Rehab Therapy-Acute\" Patient Summary Report for complete documentation.   Pt presents w/ weakness, decreased activity tolerance, decreased cognition, decreased weight shifting, decreased balance and decreased posture limiting functional mobility. Pt will benefit from skilled acute PT services to address deficits./ when PT enetered the room pt had her O2 on top of her head and her O2sat was 79%. placed o2 back in pt's nose and it took 5 min f deep breathing and cues to get pt to greater than 90%. Pt needs constant cues during session for deep breathing. pt has poor posture and folds over at wait and placed head in her lap with neck in full flexion. pt unable to get to good upright posture even w/ physical and verbal cues. unsure of pt's baseline. pt very weak and unsteady on feet w/ 1 episode of knee buckling leadint to pt sitting down in chair hard and fast w/ PT support. pt let in chair after session. pt will benefit from post acute placement for further therapy or will need 24/7 physical assistance at home. Rn notified of session.   "

## 2017-05-15 NOTE — DISCHARGE PLANNING
Pt has OP hemodialysis at [facility] 1215.  CLinic has been notified of hospitalization.

## 2017-05-15 NOTE — PROGRESS NOTES
Pt is A&Ox4.  Family is at bedside. VSS.  Denies pain.  Pt updated on POC, needs met and questions answered.  No medical changes from previous assessment noted at this time.  Telemonitor in place.  Call light within reach and working properly.

## 2017-05-15 NOTE — PROGRESS NOTES
Pt oriented to self but has some confusion on the date. Family members at bedside say that this is her baseline. Will keep monitoring throughout the shift. HBSN

## 2017-05-15 NOTE — PROGRESS NOTES
Inpatient Anticoagulation Service Note  Date: 5/15/2017  Estimated Creatinine Clearance: 4.9 mL/min (by C-G formula based on Cr of 6.9).  Reason for Anticoagulation: Deep Vein Thrombosis   Hemoglobin Value: 11.2  Hematocrit Value: 35.8  Lab Platelet Value: 44 (This result has been called to [id number] RN by Oumou Cook on 05 15 2017 at 0237, and has been read back.)  Target INR: 2.0 to 3.0  INR from last 7 days     Date/Time INR Value    05/15/17 0212 (!)2.01    05/14/17 0257 (!)1.3    05/13/17 0758 (!)1.25    05/12/17 1825 1.08        Dose from last 7 days     Date/Time Dose (mg)    05/15/17 1300 0    05/14/17 1100 4    05/13/17 1100 4    05/12/17 2300 4        Average Dose (mg): 4 (Home Dose: 4 mg daily per chart review, compliance concerns)  Significant Interactions: Antibiotics, Statin, Corticosteroids  Bridge Therapy: No   Reversal Agent Administered: Not Applicable  Comments: INR up markedly overnight (0.71 increase). H/H low/stable. PLT down/stable. No overt bleeding noted per chart review. Will hold warfarin tonight and likely adjust dose pending current DDI profile (antimicrobials noted on MAR). May need dose adjustment pending course of admission. Heparin noted per MAR review of prior dialysis sessions from prior visit, may consider investigation of HIT monitoring given drop in PLT given likely frequent exposure.    Plan:  HOLD warfarin 5/15/17  Education Material Provided?: No  Pharmacist suggested discharge dosing: warfarin 2 mg daily (pending duration of antimicrobials)    Kevin De Jesus, PHARMD

## 2017-05-15 NOTE — THERAPY
"Occupational Therapy Evaluation completed.   Functional Status:  PT is max a supine to sit and back to bed, max a to scoot to EOB, max a to don socks, max a for toilet hygiene, mod a for chair xfer, min a for seated light grooming. Pt is limited by confusion, fatigue, weakness.    Plan of Care: Will benefit from Occupational Therapy 3 times per week  Discharge Recommendations:  Equipment: Will Continue to Assess for Equipment Needs. Post-acute therapy Discharge to a transitional care facility for continued skilled therapy services.    See \"Rehab Therapy-Acute\" Patient Summary Report for complete documentation.    "

## 2017-05-15 NOTE — PROGRESS NOTES
"Hospital Medicine Interval Note  Date of Service:  5/15/2017    Chief Complaint  81/F with HTN, HLD ESRD on HD MWF, h/o recurrent DVT, on AC, Hypothyroidism, does not use O2 at home, admitted for fevers, cough productive of yellow phlegm, and chills with malaise and fatigue. CXR showed interstitial densities. WBC 3.3. Crea 3.25, with normal K, Na. Lactate 1.4. UA unimpressive. Requiring O2. Felt to have CAP. Started on antibiotics.  Influenza B positive. No labs this morning. CXR showed worsening left basilar opacity could relate to worsening atelectasis or developing pneumonia. Had unresponsiveness, bradycardia while on commode, felt to be vasovagal.       Interval Problem Update  5/15/2017 - no overnight events. Remains hemodynamically stable and afebrile. Stable on 1.5L O2 NC. No leukocytosis. Hgb stable. Platelet 44. Crea 6.9. Na, K WNL. CRP high but trending down at 3.77.    > Seen and examined. Reports feeling \"some better\". (+) SOB, with non-productive dry cough. Has history of asthma. Wheezy today. No N/V, abd pain, CP. More conversant today.    Consultants/Specialty  Nephrology    Disposition  Monitor on telemetry.       ROS     Pertinent positives/negatives as mentioned above.     A complete review of systems was done. All other systems were negative.      Physical Exam Laboratory/Imaging   Filed Vitals:    05/14/17 2000 05/14/17 2100 05/15/17 0000 05/15/17 0400   BP: 114/46 120/47 131/52 92/34   Pulse: 71 61 68 66   Temp: 36.6 °C (97.9 °F)  36.9 °C (98.4 °F) 36.7 °C (98 °F)   Resp: 16  20 16   Height:       Weight: 48.626 kg (107 lb 3.2 oz)      SpO2: 99%  92% 99%     Physical Exam   Constitutional: She is oriented to person, place, and time. She appears well-developed and well-nourished. No distress.   Weak looking   HENT:   Head: Normocephalic and atraumatic.   Mouth/Throat: No oropharyngeal exudate.   Eyes: Conjunctivae are normal. Pupils are equal, round, and reactive to light. No scleral icterus. "   Neck: Normal range of motion. Neck supple.   Cardiovascular: Normal rate and regular rhythm.  Exam reveals no gallop and no friction rub.    No murmur heard.  Pulmonary/Chest: Effort normal. No respiratory distress. She has wheezes (diffuse B/L). She has no rales. She exhibits no tenderness.   Diminished air entry.   Abdominal: Soft. Bowel sounds are normal. She exhibits no distension. There is no tenderness. There is no rebound and no guarding.   Musculoskeletal: Normal range of motion. She exhibits no edema or tenderness.   Lymphadenopathy:     She has no cervical adenopathy.   Neurological: She is alert and oriented to person, place, and time. No cranial nerve deficit.   Skin: Skin is warm and dry. No rash noted. No erythema. No pallor.   Psychiatric: Her behavior is normal. Judgment and thought content normal.   More conversant. Flat affect   Nursing note and vitals reviewed.        Lab Results   Component Value Date/Time    WBC 6.9 05/15/2017 02:12 AM    HEMOGLOBIN 11.2* 05/15/2017 02:12 AM    HEMATOCRIT 35.8* 05/15/2017 02:12 AM    PLATELET COUNT 44* 05/15/2017 02:12 AM     Lab Results   Component Value Date/Time    SODIUM 136 05/15/2017 02:12 AM    POTASSIUM 5.2 05/15/2017 02:12 AM    CHLORIDE 94* 05/15/2017 02:12 AM    CO2 29 05/15/2017 02:12 AM    GLUCOSE 116* 05/15/2017 02:12 AM    BUN 58* 05/15/2017 02:12 AM    CREATININE 6.90* 05/15/2017 02:12 AM      Assessment/Plan    Sepsis due to influenza B, postinfluenza bacterial pneumonia (CMS-HCC) (present on admission)  Assessment & Plan  - Continue tamiflu. Continue IV unasyn and azithromycin for now to cover for concomitant pneumonia given elevated CRP. Continue to trend CRP, and consider stopping antibiotics if and once CRP normalizes. Continue to trend WBC.   - continue close hemodynamic monitoring. Continue to defer IVF as dialysis dependent and hemodynamically stable. Continue telemetry.     Acute respiratory failure with hypoxia due to influenza B,  asthma exacerbation, not due to sepsis (CMS-HCC) (present on admission)  Assessment & Plan  - continue tamiflu as above. Continue RT protocol, and O2 to keep sats>88%. Start prednisone    Vasovagal syncope  Assessment & Plan  - while in a commode, having a BM. No further episodes.   - monitor on telemetry.     Asthma exacerbation  Assessment & Plan  - precipitated by influenza B.   - start prednisone 40mg PO daily. Continue bronchodilators per RT protocol. Continue O2.     History of DVT of lower extremity (present on admission)  Assessment & Plan  - Continue on Coumadin.    End stage renal disease (CMS-HCC) (present on admission)  Assessment & Plan  - nephrology on-board. Continue HD while she's in-house.   - continue home sevelamer.    Anemia of chronic disease (present on admission)  Assessment & Plan  - stable. Monitor Hgb. Transfuse for Hgb<7.     Essential hypertension (present on admission)  Assessment & Plan  - Continue metoprolol and Cozaar.    HLD (hyperlipidemia) (present on admission)  Assessment & Plan  - Continue Zocor.     Chronic thrombocytopenia (CMS-HCC) (present on admission)  Assessment & Plan  - trending down, likely from viral infection.   - Continue to monitor, CBC in AM.     Hemorrhagic disorder due to extrinsic circulating anticoagulants (CMS-HCC) (present on admission)  Assessment & Plan  - for h/o recurrent DVT. Continue coumadin per pharmacy dosing.      Labs reviewed, Medications reviewed and Radiology images reviewed  Morataya catheter: No Morataya      DVT Prophylaxis: Warfarin (Coumadin)    Ulcer prophylaxis: Not indicated  Antibiotics: Treating active infection/contamination beyond 24 hours perioperative coverage  Assessed for rehab: Patient was assess for and/or received rehabilitation services during this hospitalization

## 2017-05-15 NOTE — ASSESSMENT & PLAN NOTE
- precipitated by influenza B.   - on prednisone 40mg PO daily. Continue bronchodilators per RT protocol. Continue O2; had to increase today

## 2017-05-15 NOTE — DISCHARGE PLANNING
Care Transition Team Assessment    Met with pt at bedside. Pt states she has no needs. Per Ot, pt most likely to discharge to SNF.    Information Source  Orientation : Oriented x 4  Information Given By: Patient  Informant's Name: Skye James  Who is responsible for making decisions for patient? : Patient    Readmission Evaluation  Is this a readmission?: Yes - unplanned readmission  Why do you think you were readmitted?: Acute respiratory failure  Was an appointment arranged for you prior to discharge?: No  Were there new prescriptions you were supposed to fill after you were discharged?: Yes, prescriptions filled  Did you understand your discharge instructions?: Yes  Did you have enough support after your last discharge?: Yes    Elopement Risk  Legal Hold: No  Ambulatory or Self Mobile in Wheelchair: No-Not an Elopement Risk  Elopement Risk: Not at Risk for Elopement    Interdisciplinary Discharge Planning  Does Admitting Nurse Feel This Could be a Complex Discharge?: No  Primary Care Physician: Kay  Lives with - Patient's Self Care Capacity: Adult Children  Patient or legal guardian wants to designate a caregiver (see row info): Yes  Caregiver name: Elmer  Caregiver relationship to patient: SON  Caregiver contact info: [phone]  [place] [place] for Release of Health Information has been completed: Yes  Support Systems: Family Member(s), Friends / Neighbors  Housing / Facility: 1 Story House  Name of Care Facility: HOME  Do You Take your Prescribed Medications Regularly: Yes  Able to Return to Previous ADL's: Yes  Mobility Issues: Yes  Prior Services: Intermittent Physical Support for ADL Per Family  Patient Expects to be Discharged to:: home  Assistance Needed: Yes  Durable Medical Equipment: Walker  DME Provider / Phone: walker    Discharge Preparedness  What is your plan after discharge?: Uncertain - pending medical team collaboration  What are your discharge supports?: Sibling, Other (comment)  (Grandchild)  Prior Functional Level: Ambulatory, Independent with Activities of Daily Living, Independent with Medication Management, Uses Walker  Difficulity with ADLs: Walking  Difficulty with ADLs Comment: Pt uses walker  Difficulity with IADLs: Driving  Difficulity with IADL Comments: Pt doesn't drive    Functional Assesment  Prior Functional Level: Ambulatory, Independent with Activities of Daily Living, Independent with Medication Management, Uses Walker    Finances  Financial Barriers to Discharge: No  Prescription Coverage: Yes (Pharmacy: Pt unsure)    Vision / Hearing Impairment  Vision Impairment : Yes  Hearing Impairment : Yes  Hearing Impairment: Other (Comments) (NONE)  Does Pt Need Special Equipment for the Hearing Impaired?: Yes-But Does not Need for Facility to Arrange Equipment    Values / Beliefs / Concerns  Values / Beliefs Concerns : No  Special Hospitalization Concerns:  (SORE THROAT)    Advance Directive  Advance Directive?: None  Advance Directive offered?: AD Booklet refused    Domestic Abuse  Have you ever been the victim of abuse or violence?: No  Physical Abuse or Sexual Abuse: No  Verbal Abuse or Emotional Abuse: No  Possible Abuse Reported to:: Not Applicable    Psychological Assessment  History of Substance Abuse: None  History of Psychiatric Problems: No  Non-compliant with Treatment: No  Newly Diagnosed Illness: No    Discharge Risks or Barriers  Discharge risks or barriers?: No    Anticipated Discharge Information  Anticipated discharge disposition: Discharge needs currently unknown, SNF  Discharge Address: N/A  Discharge Contact Phone Number: [phone]

## 2017-05-15 NOTE — PROGRESS NOTES
Oumou from Lab called with critical result of platelets 44 at 0237. Critical lab result read back to Samantha NOYOLA.   Samantha NOYOLA notified of critical lab result at 0314.  Critical lab result read back by Samantha NOYOLA.  No orders received at this time.

## 2017-05-16 PROBLEM — R65.10 SIRS (SYSTEMIC INFLAMMATORY RESPONSE SYNDROME) (HCC): Status: ACTIVE | Noted: 2017-05-16

## 2017-05-16 LAB
ANION GAP SERPL CALC-SCNC: 9 MMOL/L (ref 0–11.9)
ANISOCYTOSIS BLD QL SMEAR: ABNORMAL
BASOPHILS # BLD AUTO: 0 % (ref 0–1.8)
BASOPHILS # BLD: 0 K/UL (ref 0–0.12)
BUN SERPL-MCNC: 35 MG/DL (ref 8–22)
BURR CELLS BLD QL SMEAR: NORMAL
CALCIUM SERPL-MCNC: 9.2 MG/DL (ref 8.5–10.5)
CHLORIDE SERPL-SCNC: 95 MMOL/L (ref 96–112)
CO2 SERPL-SCNC: 32 MMOL/L (ref 20–33)
CREAT SERPL-MCNC: 4.71 MG/DL (ref 0.5–1.4)
CRP SERPL HS-MCNC: 4.35 MG/DL (ref 0–0.75)
EOSINOPHIL # BLD AUTO: 0 K/UL (ref 0–0.51)
EOSINOPHIL NFR BLD: 0 % (ref 0–6.9)
ERYTHROCYTE [DISTWIDTH] IN BLOOD BY AUTOMATED COUNT: 55.6 FL (ref 35.9–50)
GFR SERPL CREATININE-BSD FRML MDRD: 9 ML/MIN/1.73 M 2
GIANT PLATELETS BLD QL SMEAR: NORMAL
GLUCOSE SERPL-MCNC: 162 MG/DL (ref 65–99)
HCT VFR BLD AUTO: 33 % (ref 37–47)
HGB BLD-MCNC: 10.4 G/DL (ref 12–16)
INR PPP: 3.39 (ref 0.87–1.13)
LYMPHOCYTES # BLD AUTO: 0.49 K/UL (ref 1–4.8)
LYMPHOCYTES NFR BLD: 10.4 % (ref 22–41)
MACROCYTES BLD QL SMEAR: ABNORMAL
MANUAL DIFF BLD: NORMAL
MCH RBC QN AUTO: 31.1 PG (ref 27–33)
MCHC RBC AUTO-ENTMCNC: 31.5 G/DL (ref 33.6–35)
MCV RBC AUTO: 98.8 FL (ref 81.4–97.8)
MONOCYTES # BLD AUTO: 0 K/UL (ref 0–0.85)
MONOCYTES NFR BLD AUTO: 0 % (ref 0–13.4)
MORPHOLOGY BLD-IMP: NORMAL
NEUTROPHILS # BLD AUTO: 4.21 K/UL (ref 2–7.15)
NEUTROPHILS NFR BLD: 89.6 % (ref 44–72)
NRBC # BLD AUTO: 0 K/UL
NRBC BLD AUTO-RTO: 0 /100 WBC
OVALOCYTES BLD QL SMEAR: NORMAL
PLATELET # BLD AUTO: 52 K/UL (ref 164–446)
PLATELET BLD QL SMEAR: NORMAL
PMV BLD AUTO: 13.5 FL (ref 9–12.9)
POIKILOCYTOSIS BLD QL SMEAR: NORMAL
POTASSIUM SERPL-SCNC: 4.6 MMOL/L (ref 3.6–5.5)
PROTHROMBIN TIME: 35.3 SEC (ref 12–14.6)
RBC # BLD AUTO: 3.34 M/UL (ref 4.2–5.4)
RBC BLD AUTO: PRESENT
SCHISTOCYTES BLD QL SMEAR: NORMAL
SODIUM SERPL-SCNC: 136 MMOL/L (ref 135–145)
WBC # BLD AUTO: 4.7 K/UL (ref 4.8–10.8)

## 2017-05-16 PROCEDURE — 700101 HCHG RX REV CODE 250

## 2017-05-16 PROCEDURE — 700111 HCHG RX REV CODE 636 W/ 250 OVERRIDE (IP): Performed by: INTERNAL MEDICINE

## 2017-05-16 PROCEDURE — 99232 SBSQ HOSP IP/OBS MODERATE 35: CPT | Performed by: HOSPITALIST

## 2017-05-16 PROCEDURE — 94640 AIRWAY INHALATION TREATMENT: CPT

## 2017-05-16 PROCEDURE — 94760 N-INVAS EAR/PLS OXIMETRY 1: CPT

## 2017-05-16 PROCEDURE — 85007 BL SMEAR W/DIFF WBC COUNT: CPT

## 2017-05-16 PROCEDURE — 770021 HCHG ROOM/CARE - ISO PRIVATE

## 2017-05-16 PROCEDURE — A9270 NON-COVERED ITEM OR SERVICE: HCPCS | Performed by: HOSPITALIST

## 2017-05-16 PROCEDURE — 700102 HCHG RX REV CODE 250 W/ 637 OVERRIDE(OP): Performed by: HOSPITALIST

## 2017-05-16 PROCEDURE — 85610 PROTHROMBIN TIME: CPT

## 2017-05-16 PROCEDURE — 36415 COLL VENOUS BLD VENIPUNCTURE: CPT

## 2017-05-16 PROCEDURE — 80048 BASIC METABOLIC PNL TOTAL CA: CPT

## 2017-05-16 PROCEDURE — 85027 COMPLETE CBC AUTOMATED: CPT

## 2017-05-16 PROCEDURE — 86140 C-REACTIVE PROTEIN: CPT

## 2017-05-16 RX ORDER — WARFARIN SODIUM 2 MG/1
2 TABLET ORAL
Status: DISCONTINUED | OUTPATIENT
Start: 2017-05-17 | End: 2017-05-17 | Stop reason: HOSPADM

## 2017-05-16 RX ORDER — IPRATROPIUM BROMIDE AND ALBUTEROL SULFATE 2.5; .5 MG/3ML; MG/3ML
SOLUTION RESPIRATORY (INHALATION)
Status: COMPLETED
Start: 2017-05-16 | End: 2017-05-16

## 2017-05-16 RX ORDER — AMOXICILLIN AND CLAVULANATE POTASSIUM 500; 125 MG/1; MG/1
1 TABLET, FILM COATED ORAL EVERY 24 HOURS
Status: DISCONTINUED | OUTPATIENT
Start: 2017-05-16 | End: 2017-05-17 | Stop reason: HOSPADM

## 2017-05-16 RX ADMIN — IPRATROPIUM BROMIDE AND ALBUTEROL SULFATE 3 ML: .5; 3 SOLUTION RESPIRATORY (INHALATION) at 17:03

## 2017-05-16 RX ADMIN — AMOXICILLIN AND CLAVULANATE POTASSIUM 1 TABLET: 500; 125 TABLET, FILM COATED ORAL at 23:05

## 2017-05-16 RX ADMIN — PREDNISONE 40 MG: 20 TABLET ORAL at 09:57

## 2017-05-16 RX ADMIN — RENAGEL 800 MG: 800 TABLET ORAL at 18:06

## 2017-05-16 RX ADMIN — AZITHROMYCIN 250 MG: 250 TABLET, FILM COATED ORAL at 18:06

## 2017-05-16 RX ADMIN — SIMVASTATIN 40 MG: 40 TABLET, FILM COATED ORAL at 23:05

## 2017-05-16 RX ADMIN — RENAGEL 800 MG: 800 TABLET ORAL at 09:57

## 2017-05-16 ASSESSMENT — ENCOUNTER SYMPTOMS
DIZZINESS: 0
ORTHOPNEA: 0
PALPITATIONS: 0
COUGH: 1
SHORTNESS OF BREATH: 1
DIARRHEA: 0
FEVER: 0
NAUSEA: 0
SENSORY CHANGE: 0
SHORTNESS OF BREATH: 0
ABDOMINAL PAIN: 0
MYALGIAS: 0
VOMITING: 0
COUGH: 0
FOCAL WEAKNESS: 0
CHILLS: 0

## 2017-05-16 ASSESSMENT — PAIN SCALES - GENERAL
PAINLEVEL_OUTOF10: 0

## 2017-05-16 NOTE — CARE PLAN
Problem: Safety  Goal: Will remain free from falls  Intervention: Implement fall precautions  Bed alarm on, bed locked in low position, treaded socks on, call light and items within reach. Hourly rounding in place, near nurses station. Assessed mobility and communicated with CNA and patient regarding risk of fall.             Problem: Respiratory:  Goal: Respiratory status will improve  Encouraged use of IS, coughing and deep breathing. Ensured Oxygen saturation was an an acceptable level

## 2017-05-16 NOTE — DISCHARGE PLANNING
Transitional Care Navigator:    Met with pt at bedside to discuss transitional care services. Discussed levels of care. Pt is agreeable to SNF placement and [facility]. Choice form completed and faxed to [facility]. TCN to follow as needed.

## 2017-05-16 NOTE — PROGRESS NOTES
Pt resting quietly in bed. Tylenol given for headache, pt denies complaints at this time. Pt asked that scheduled metoprolol be retimed for morning, as that is when she normally takes this medication. Message sent to pharmacy, dose rescheduled. Bed in low locked position, call bell in reach.

## 2017-05-16 NOTE — PROGRESS NOTES
Hospital Medicine Interval Note    Date of Service: 2017    Chief Complaint  81 year-old female admitted 2017 with influenza B and superimposed pneumonia on left side; asthma.  Was very confused 5/15.    Interval Problem Update  Much less confused per RN, alert and oriented, periods of brief confusion but clears quickly.  Still with coarse lung sounds.  Supposed to be on O2 at home, doesn't want to use it; desats to 70s on RA      Consultants/Specialty  Nephrology Dr. Najjar    Disposition  Can transfer to [facility] floor    FC  D/W tx team on rounds        Review of Systems   Constitutional: Negative for fever and chills.   Respiratory: Positive for cough and shortness of breath.    Cardiovascular: Negative for chest pain and palpitations.   Gastrointestinal: Negative for nausea, vomiting and abdominal pain.   Musculoskeletal: Negative for myalgias.   Neurological: Negative for dizziness.      Physical Exam  Laboratory/Imaging   Hemodynamics  Temp (24hrs), Av.5 °C (97.7 °F), Min:36.4 °C (97.5 °F), Max:36.6 °C (97.8 °F)   Temperature: 36.4 °C (97.5 °F)  Pulse  Av.3  Min: 58  Max: 94    Blood Pressure : (!) 97/36 mmHg      Respiratory      Respiration: 18, Pulse Oximetry: 95 %        RUL Breath Sounds: Clear;Diminished, RML Breath Sounds: Diminished, RLL Breath Sounds: Diminished, MECCA Breath Sounds: Clear;Diminished, LLL Breath Sounds: Diminished    Fluids       Nutrition  Orders Placed This Encounter   Procedures   • Diet Order     Standing Status: Standing      Number of Occurrences: 1      Standing Expiration Date:      Order Specific Question:  Diet:     Answer:  Renal [8]     Physical Exam   Cardiovascular: Normal rate, regular rhythm and normal heart sounds.    No murmur heard.  Pulmonary/Chest: Effort normal. No respiratory distress. She has wheezes (mild). She has rales (left sided).   6L O2   Abdominal: Soft. Bowel sounds are normal. She exhibits no distension. There is no tenderness.    Musculoskeletal: Normal range of motion. She exhibits no edema.   Neurological: She is alert.   Oriented but then drifts off into tangents   Skin: Skin is warm and dry. No erythema.   Nursing note and vitals reviewed.      Recent Labs      05/14/17   0257  05/15/17   0212  05/16/17   0231   WBC  6.0  6.9  4.7*   RBC  3.74*  3.57*  3.34*   HEMOGLOBIN  11.9*  11.2*  10.4*   HEMATOCRIT  37.3  35.8*  33.0*   MCV  99.7*  100.3*  98.8*   MCH  31.8  31.4  31.1   MCHC  31.9*  31.3*  31.5*   RDW  59.0*  57.3*  55.6*   PLATELETCT  58*  44*  52*   MPV  13.3*  13.3*  13.5*     Recent Labs      05/14/17   0257  05/15/17   0212  05/16/17   0231   SODIUM  138  136  136   POTASSIUM  4.8  5.2  4.6   CHLORIDE  98  94*  95*   CO2  30  29  32   GLUCOSE  104*  116*  162*   BUN  42*  58*  35*   CREATININE  5.85*  6.90*  4.71*   CALCIUM  9.0  8.9  9.2     Recent Labs      05/14/17   0257  05/15/17   0212  05/16/17   0231   INR  1.30*  2.01*  3.39*                  Assessment/Plan     Sepsis due to influenza B, postinfluenza bacterial pneumonia (CMS-HCC) (present on admission)  Assessment & Plan  - On last day of tamiflu.   - on unasyn/azithro; change to augmentin po; cx negative      Acute respiratory failure with hypoxia due to influenza B, asthma exacerbation, not due to sepsis (CMS-HCC) (present on admission)  Assessment & Plan  -continue O2, needs to use at home    Vasovagal syncope  Assessment & Plan  - while on commode, having a BM. No further episodes.     Asthma exacerbation  Assessment & Plan  - precipitated by influenza B.   - on prednisone 40mg PO daily. Continue bronchodilators per RT protocol. Continue O2; had to increase today    History of DVT of lower extremity (present on admission)  Assessment & Plan  - Continue on Coumadin.    End stage renal disease (CMS-HCC) (present on admission)  Assessment & Plan  - nephrology on-board. Continue HD while she's in-house.   - continue home sevelamer.    Anemia of chronic disease  (present on admission)  Assessment & Plan  - stable. Monitor Hgb. Transfuse for Hgb<7.     Essential hypertension (present on admission)  Assessment & Plan  - Continue metoprolol and Cozaar, controlled    HLD (hyperlipidemia) (present on admission)  Assessment & Plan  - Continue Zocor.     Chronic thrombocytopenia (CMS-HCC) (present on admission)  Assessment & Plan  - trending down, likely from viral infection.   - Continue to monitor, CBC in AM.     Hemorrhagic disorder due to extrinsic circulating anticoagulants (CMS-HCC) (present on admission)  Assessment & Plan  - for h/o recurrent DVT. Continue coumadin per pharmacy dosing.       Labs reviewed, Medications reviewed and Radiology images reviewed  Morataya catheter: No Morataya      DVT Prophylaxis: Warfarin (Coumadin)    Ulcer prophylaxis: Not indicated  Antibiotics: Treating active infection/contamination beyond 24 hours perioperative coverage  Assessed for rehab: Patient was assess for and/or received rehabilitation services during this hospitalization

## 2017-05-16 NOTE — DISCHARGE PLANNING
[facility] received a SNF choice form. Per the choice form the referral has been sent to [facility]

## 2017-05-16 NOTE — CARE PLAN
Problem: Fluid Volume:  Goal: Will maintain balanced intake and output  Outcome: PROGRESSING AS EXPECTED  Pt on MWF dialysis schedule. 1L removed 5/15. BPs remaining in 90s, low 100s - pt asymptomatic. Pt reports she makes small amounts of urine. None noted so far this shift.    Problem: Skin Integrity  Goal: Risk for impaired skin integrity will decrease  Outcome: PROGRESSING AS EXPECTED  Pt makes frequent positional changes in bed. Barrier wipes in use for buttocks, keeping skin clean and dry.

## 2017-05-16 NOTE — PROGRESS NOTES
Bedside report completed. Pt sitting up in bed eating dinner, undergoing dialysis. Denies complaints. Dialysis RN in room with pt. Bed in low locked position, call bell in reach.

## 2017-05-16 NOTE — PROGRESS NOTES
Inpatient Anticoagulation Service Note  Date: 5/16/2017  Estimated Creatinine Clearance: 7.4 mL/min (by C-G formula based on Cr of 4.71).  Reason for Anticoagulation: Deep Vein Thrombosis   Hemoglobin Value: 10.4  Hematocrit Value: 33  Lab Platelet Value: 52  Target INR: 2.0 to 3.0  INR from last 7 days     Date/Time INR Value    05/16/17 0231 (!)3.39    05/15/17 0212 (!)2.01    05/14/17 0257 (!)1.3    05/13/17 0758 (!)1.25    05/12/17 1825 1.08        Dose from last 7 days     Date/Time Dose (mg)    05/16/17 1100 0    05/15/17 1300 0    05/14/17 1100 4    05/13/17 1100 4    05/12/17 2300 4        Average Dose (mg): TBD (Home Dose: 4 mg daily per chart review, compliance concerns)  Significant Interactions: Antibiotics, Statin, Corticosteroids  Bridge Therapy: No   Reversal Agent Administered: Not Applicable  Comments: INR up again today. Continued warfarin interactions, including azithromycin. H/H down. PLT low/stable. No overt bleeding noted, provider following low PLT. Will hold warfarin tonight and trend INR with AM labs. May need dose adjustment pending clinical course.    Plan:  HOLD warfarin 5/16/17  Education Material Provided?: No  Pharmacist suggested discharge dosing: warfarin 2 mg daily (pending duration of antimicrobials; likely to resume home dosing thereafter)    Kevin De Jesus, PHARMD

## 2017-05-16 NOTE — PROGRESS NOTES
Hemodialysis ordered by Dr Najjar for 3hrs on 2k acid bath.  Treatment initiated at 1628 and ended at 1928.  Pt tolerated tx well. Net UF 1000ml. Please see flowsheet for details.  Report given to staff RN, MICHEL Edmondson RN.    Pre and post tx, RUAAVF positive for bruit and thrill. Manual pressure held for 10mins; then sites covered with clean and dry dressing, CDI.

## 2017-05-16 NOTE — CARE PLAN
Problem: Respiratory:  Goal: Respiratory status will improve  Intervention: Educate and encourage coughing and deep breathing  Encouraged use of IS, coughing and deep breathing. Ensured Oxygen saturation was an an acceptable level. Monitored closely for any respiratory distress. Up in bed 90 degrees and/or up in chair for meals to prevent aspiration. Dysphagia screening completed.          Problem: Skin Integrity  Goal: Risk for impaired skin integrity will decrease  Waffle cushion mattress topper added, encouraged mobilization. Q2 turns in place, bony prominences padded, encouraged PO intake. Assessed skin thoroughly and ensured moisture was minimized.

## 2017-05-16 NOTE — DISCHARGE PLANNING
[facility] received a call from Selina at [facility]. The referral has been accepted pending an Isolation Bed.

## 2017-05-16 NOTE — PROGRESS NOTES
Updated MD on the confusion, ABGs ordered, came back normal. Pt is less confused, monitoring closely for ALOC

## 2017-05-16 NOTE — PROGRESS NOTES
Nephrology Progress Note, Adult, Complex               Author: Fadi Najjar Date & Time created: 5/16/2017  12:55 PM     Interval History:  82 y/o female with ESRD/HD  Admitted with flu and PNA  Doing better  No new complaints  Review of Systems:  Review of Systems   Constitutional: Positive for malaise/fatigue. Negative for fever and chills.   Respiratory: Negative for cough and shortness of breath.    Cardiovascular: Negative for chest pain, orthopnea and leg swelling.   Gastrointestinal: Negative for nausea, vomiting, abdominal pain and diarrhea.   Genitourinary: Negative for dysuria.   Neurological: Negative for dizziness, sensory change and focal weakness.       Physical Exam:  Physical Exam   Constitutional: No distress.   cachectic female   HENT:   Head: Normocephalic and atraumatic.   Mouth/Throat: Oropharynx is clear and moist. No oropharyngeal exudate.   Eyes: No scleral icterus.   Cardiovascular: Normal rate and regular rhythm.    Pulmonary/Chest: She has no wheezes. She has no rales.   Coarse BS   Abdominal: Soft.   Musculoskeletal: She exhibits no edema.   Neurological: She is alert.       Labs:  Recent Labs      05/14/17   0350  05/15/17   1704   EGBNC20J  7.33*  7.38*   [id number]  57.4*  49.1*   [id number]  40.5*  88.5*   [id number]  67.5*  95.5   ARTHCO3  29*  29*   ARTBE  2  3         Recent Labs      05/14/17   0257  05/15/17   0212  05/16/17   0231   SODIUM  138  136  136   POTASSIUM  4.8  5.2  4.6   CHLORIDE  98  94*  95*   CO2  30  29  32   BUN  42*  58*  35*   CREATININE  5.85*  6.90*  4.71*   CALCIUM  9.0  8.9  9.2     Recent Labs      05/14/17   0257  05/15/17   0212  05/16/17   0231   GLUCOSE  104*  116*  162*     Recent Labs      05/14/17   0257  05/15/17   0212  05/16/17   0231   RBC  3.74*  3.57*  3.34*   HEMOGLOBIN  11.9*  11.2*  10.4*   HEMATOCRIT  37.3  35.8*  33.0*   PLATELETCT  58*  44*  52*   PROTHROMBTM  16.6*  23.4*  35.3*   INR  1.30*  2.01*  3.39*     Recent Labs      05/14/17    0257  05/15/17   0212  17   0231   WBC  6.0  6.9  4.7*   NEUTSPOLYS  64.00  73.00*  89.60*   LYMPHOCYTES  11.40*  5.20*  10.40*   MONOCYTES  7.90  9.60  0.00   EOSINOPHILS  0.00  0.00  0.00   BASOPHILS  0.00  0.00  0.00           Hemodynamics:  Temp (24hrs), Av.4 °C (97.6 °F), Min:36.2 °C (97.1 °F), Max:36.6 °C (97.8 °F)  Temperature: 36.2 °C (97.1 °F)  Pulse  Av.3  Min: 58  Max: 94   Blood Pressure : 101/40 mmHg     Respiratory:    Respiration: 17, Pulse Oximetry: 90 %        RUL Breath Sounds: Rhonchi, RML Breath Sounds: Rhonchi;Expiratory Wheezes, RLL Breath Sounds: Rhonchi;Expiratory Wheezes, MECCA Breath Sounds: Rhonchi, LLL Breath Sounds: Rhonchi;Expiratory Wheezes  Fluids:    Intake/Output Summary (Last 24 hours) at 17 1255  Last data filed at 17 1000   Gross per 24 hour   Intake   1220 ml   Output   1400 ml   Net   -180 ml     Weight: 51.347 kg (113 lb 3.2 oz)  GI/Nutrition:  Orders Placed This Encounter   Procedures   • Diet Order     Standing Status: Standing      Number of Occurrences: 1      Standing Expiration Date:      Order Specific Question:  Diet:     Answer:  Renal [8]     Medical Decision Making, by Problem:  Active Hospital Problems    Diagnosis   • Sepsis due to influenza B, postinfluenza bacterial pneumonia (CMS-HCC) [A41.9]   • Vasovagal syncope [R55]   • Acute respiratory failure with hypoxia due to influenza B, not due to sepsis (CMS-HCC) [J96.01]   • HLD (hyperlipidemia) [E78.5]   • Chronic thrombocytopenia (CMS-HCC) [D69.6]   • Hemorrhagic disorder due to extrinsic circulating anticoagulants (CMS-[facility]) [D68.32]   • Essential hypertension [I10]   • End stage renal disease (CMS-HCC) [N18.6]   • Anemia of chronic disease [D63.8]   • History of DVT of lower extremity [Z86.718]       Labs reviewed and Medications reviewed                      Assessment and Plan    1.ESRD/HD -MWF  2.HTN: BP well controlled  3.Electrolytes: well controlled.  4.Anemia: Hb at  goal  5.PNA  6.Volume:well controlled    Diet: HD MWF          All meds to renal doses    No need for HD today

## 2017-05-17 VITALS
BODY MASS INDEX: 21.22 KG/M2 | SYSTOLIC BLOOD PRESSURE: 109 MMHG | HEIGHT: 62 IN | TEMPERATURE: 97.6 F | OXYGEN SATURATION: 95 % | WEIGHT: 115.3 LBS | DIASTOLIC BLOOD PRESSURE: 55 MMHG | RESPIRATION RATE: 18 BRPM | HEART RATE: 62 BPM

## 2017-05-17 PROBLEM — E78.5 HLD (HYPERLIPIDEMIA): Status: RESOLVED | Noted: 2017-05-13 | Resolved: 2017-05-17

## 2017-05-17 PROBLEM — A41.9 SEPSIS, UNSPECIFIED: Status: RESOLVED | Noted: 2017-05-12 | Resolved: 2017-05-17

## 2017-05-17 PROBLEM — R55 VASOVAGAL SYNCOPE: Status: RESOLVED | Noted: 2017-05-14 | Resolved: 2017-05-17

## 2017-05-17 PROBLEM — J45.901 ASTHMA EXACERBATION: Status: RESOLVED | Noted: 2017-05-15 | Resolved: 2017-05-17

## 2017-05-17 LAB
ANION GAP SERPL CALC-SCNC: 10 MMOL/L (ref 0–11.9)
BACTERIA BLD CULT: NORMAL
BACTERIA BLD CULT: NORMAL
BUN SERPL-MCNC: 59 MG/DL (ref 8–22)
CALCIUM SERPL-MCNC: 9.5 MG/DL (ref 8.5–10.5)
CHLORIDE SERPL-SCNC: 91 MMOL/L (ref 96–112)
CO2 SERPL-SCNC: 29 MMOL/L (ref 20–33)
CREAT SERPL-MCNC: 6.1 MG/DL (ref 0.5–1.4)
ERYTHROCYTE [DISTWIDTH] IN BLOOD BY AUTOMATED COUNT: 53.7 FL (ref 35.9–50)
GFR SERPL CREATININE-BSD FRML MDRD: 7 ML/MIN/1.73 M 2
GLUCOSE SERPL-MCNC: 129 MG/DL (ref 65–99)
HCT VFR BLD AUTO: 34 % (ref 37–47)
HGB BLD-MCNC: 11 G/DL (ref 12–16)
INR PPP: 2.99 (ref 0.87–1.13)
MCH RBC QN AUTO: 31.4 PG (ref 27–33)
MCHC RBC AUTO-ENTMCNC: 32.4 G/DL (ref 33.6–35)
MCV RBC AUTO: 97.1 FL (ref 81.4–97.8)
PLATELET # BLD AUTO: 66 K/UL (ref 164–446)
PMV BLD AUTO: 13.7 FL (ref 9–12.9)
POTASSIUM SERPL-SCNC: 5 MMOL/L (ref 3.6–5.5)
PROTHROMBIN TIME: 32 SEC (ref 12–14.6)
RBC # BLD AUTO: 3.5 M/UL (ref 4.2–5.4)
SIGNIFICANT IND 70042: NORMAL
SIGNIFICANT IND 70042: NORMAL
SITE SITE: NORMAL
SITE SITE: NORMAL
SODIUM SERPL-SCNC: 130 MMOL/L (ref 135–145)
SOURCE SOURCE: NORMAL
SOURCE SOURCE: NORMAL
WBC # BLD AUTO: 4.6 K/UL (ref 4.8–10.8)

## 2017-05-17 PROCEDURE — 700111 HCHG RX REV CODE 636 W/ 250 OVERRIDE (IP): Performed by: INTERNAL MEDICINE

## 2017-05-17 PROCEDURE — 80048 BASIC METABOLIC PNL TOTAL CA: CPT

## 2017-05-17 PROCEDURE — 700102 HCHG RX REV CODE 250 W/ 637 OVERRIDE(OP): Performed by: HOSPITALIST

## 2017-05-17 PROCEDURE — 90935 HEMODIALYSIS ONE EVALUATION: CPT

## 2017-05-17 PROCEDURE — 85027 COMPLETE CBC AUTOMATED: CPT

## 2017-05-17 PROCEDURE — 85610 PROTHROMBIN TIME: CPT

## 2017-05-17 PROCEDURE — 99239 HOSP IP/OBS DSCHRG MGMT >30: CPT | Performed by: HOSPITALIST

## 2017-05-17 PROCEDURE — 36415 COLL VENOUS BLD VENIPUNCTURE: CPT

## 2017-05-17 PROCEDURE — A9270 NON-COVERED ITEM OR SERVICE: HCPCS | Performed by: HOSPITALIST

## 2017-05-17 RX ORDER — OSELTAMIVIR PHOSPHATE 30 MG/1
30 CAPSULE ORAL ONCE
Start: 2017-05-17 | End: 2017-05-17

## 2017-05-17 RX ORDER — SEVELAMER HYDROCHLORIDE 800 MG/1
800 TABLET, FILM COATED ORAL 2 TIMES DAILY WITH MEALS
Qty: 90 TAB | Refills: 3
Start: 2017-05-17 | End: 2017-06-11

## 2017-05-17 RX ORDER — GUAIFENESIN/DEXTROMETHORPHAN 100-10MG/5
10 SYRUP ORAL EVERY 6 HOURS PRN
Qty: 560 ML
Start: 2017-05-17 | End: 2017-06-11

## 2017-05-17 RX ORDER — AMOXICILLIN 250 MG
2 CAPSULE ORAL 2 TIMES DAILY
Qty: 30 TAB | Refills: 0
Start: 2017-05-17 | End: 2017-06-11

## 2017-05-17 RX ORDER — ONDANSETRON 4 MG/1
4 TABLET, ORALLY DISINTEGRATING ORAL EVERY 4 HOURS PRN
Qty: 10 TAB | Refills: 0
Start: 2017-05-17 | End: 2017-06-11

## 2017-05-17 RX ORDER — ACETAMINOPHEN 325 MG/1
650 TABLET ORAL EVERY 6 HOURS PRN
Qty: 30 TAB | Refills: 0
Start: 2017-05-17 | End: 2017-06-11

## 2017-05-17 RX ORDER — PREDNISONE 20 MG/1
TABLET ORAL
Qty: 30 TAB | Refills: 0
Start: 2017-05-17 | End: 2017-06-11

## 2017-05-17 RX ORDER — AMOXICILLIN AND CLAVULANATE POTASSIUM 500; 125 MG/1; MG/1
1 TABLET, FILM COATED ORAL EVERY 24 HOURS
Qty: 5 TAB | Refills: 0
Start: 2017-05-18 | End: 2017-05-23

## 2017-05-17 RX ORDER — WARFARIN SODIUM 2 MG/1
2 TABLET ORAL
Qty: 30 TAB | Refills: 3
Start: 2017-05-17 | End: 2017-06-11

## 2017-05-17 RX ADMIN — LOSARTAN POTASSIUM 25 MG: 50 TABLET, FILM COATED ORAL at 09:24

## 2017-05-17 RX ADMIN — METOPROLOL SUCCINATE 25 MG: 25 TABLET, EXTENDED RELEASE ORAL at 09:24

## 2017-05-17 RX ADMIN — RENAGEL 800 MG: 800 TABLET ORAL at 09:24

## 2017-05-17 RX ADMIN — PREDNISONE 40 MG: 20 TABLET ORAL at 09:24

## 2017-05-17 ASSESSMENT — ENCOUNTER SYMPTOMS
COUGH: 0
FEVER: 0
ORTHOPNEA: 0
CHILLS: 0
DIZZINESS: 0
VOMITING: 0
SENSORY CHANGE: 0
SHORTNESS OF BREATH: 0
NAUSEA: 0
FOCAL WEAKNESS: 0
ABDOMINAL PAIN: 0
DIARRHEA: 0

## 2017-05-17 ASSESSMENT — PAIN SCALES - GENERAL
PAINLEVEL_OUTOF10: 0
PAINLEVEL_OUTOF10: 0

## 2017-05-17 NOTE — DISCHARGE INSTRUCTIONS
Discharge Instructions    Discharged to other by medical transportation with escort. Discharged via wheelchair, hospital escort: Refused.  Special equipment needed: Oxygen    Be sure to schedule a follow-up appointment with your primary care doctor or any specialists as instructed.     Discharge Plan:   Diet Plan: Discussed  Activity Level: Discussed  Confirmed Follow up Appointment: Patient to Call and Schedule Appointment  Confirmed Symptoms Management: Discussed  Medication Reconciliation Updated: Yes  Influenza Vaccine Indication: Patient Refuses    I understand that a diet low in cholesterol, fat, and sodium is recommended for good health. Unless I have been given specific instructions below for another diet, I accept this instruction as my diet prescription.   Other diet: Renal    Special Instructions: None    · Is patient discharged on Warfarin / Coumadin?   No     · Is patient Post Blood Transfusion?  No    Depression / Suicide Risk    As you are discharged from [facility], it is important to learn how to keep safe from harming yourself.    Recognize the warning signs:  · Abrupt changes in personality, positive or negative- including increase in energy   · Giving away possessions  · Change in eating patterns- significant weight changes-  positive or negative  · Change in sleeping patterns- unable to sleep or sleeping all the time   · Unwillingness or inability to communicate  · Depression  · Unusual sadness, discouragement and loneliness  · Talk of wanting to die  · Neglect of personal appearance   · Rebelliousness- reckless behavior  · Withdrawal from people/activities they love  · Confusion- inability to concentrate     If you or a loved one observes any of these behaviors or has concerns about self-harm, here's what you can do:  · Talk about it- your feelings and reasons for harming yourself  · Remove any means that you might use to hurt yourself (examples: pills, rope, extension cords,  firearm)  · Get professional help from the community (Mental Health, Substance Abuse, psychological counseling)  · Do not be alone:Call your Safe Contact- someone whom you trust who will be there for you.  · Call your local CRISIS HOTLINE 912-0972 or 994-965-2736  · Call your local Children's Mobile Crisis Response Team [place] (989) 575-3073 or www.[employer]  · Call the toll free National Suicide Prevention Hotlines   · National Suicide Prevention Lifeline 005-184-BDLD (7245)  · National Hope Line Network 800-SUICIDE (410-1252)

## 2017-05-17 NOTE — DISCHARGE PLANNING
Transportation has been rescheduled with [facility] at 1530 SPC Auth number is [id number]. SW on floor Nadia has been notified.

## 2017-05-17 NOTE — PROGRESS NOTES
Nephrology Progress Note, Adult, Complex               Author: Fadi Najjar Date & Time created: 5/17/2017  12:00 PM     Interval History:  80 y/o female with ESRD/HD  Admitted with flu and PNA  Doing better  No new complaints  Seen and examined while getting HD.    Review of Systems:  Review of Systems   Constitutional: Negative for fever, chills and malaise/fatigue.   Respiratory: Negative for cough and shortness of breath.    Cardiovascular: Negative for chest pain, orthopnea and leg swelling.   Gastrointestinal: Negative for nausea, vomiting, abdominal pain and diarrhea.   Genitourinary: Negative for dysuria.   Neurological: Negative for dizziness, sensory change and focal weakness.       Physical Exam:  Physical Exam   Constitutional: No distress.   cachectic female   HENT:   Head: Normocephalic and atraumatic.   Mouth/Throat: Oropharynx is clear and moist. No oropharyngeal exudate.   Eyes: No scleral icterus.   Cardiovascular: Normal rate and regular rhythm.    Pulmonary/Chest: She has no wheezes. She has no rales.   Coarse BS   Abdominal: Soft.   Musculoskeletal: She exhibits no edema.   Neurological: She is alert.       Labs:  Recent Labs      05/15/17   1704   [id number]  7.38*   [id number]  49.1*   [id number]  88.5*   [id number]  95.5   ARTHCO3  29*   ARTBE  3         Recent Labs      05/15/17   0212  05/16/17   0231  05/17/17   0335   SODIUM  136  136  130*   POTASSIUM  5.2  4.6  5.0   CHLORIDE  94*  95*  91*   CO2  29  32  29   BUN  58*  35*  59*   CREATININE  6.90*  4.71*  6.10*   CALCIUM  8.9  9.2  9.5     Recent Labs      05/15/17   0212  05/16/17   0231  05/17/17   0335   GLUCOSE  116*  162*  129*     Recent Labs      05/15/17   0212  05/16/17   0231 05/17/17   0335   RBC  3.57*  3.34*  3.50*   HEMOGLOBIN  11.2*  10.4*  11.0*   HEMATOCRIT  35.8*  33.0*  34.0*   PLATELETCT  44*  52*  66*   PROTHROMBTM  23.4*  35.3*  32.0*   INR  2.01*  3.39*  2.99*     Recent Labs      05/15/17   0212  05/16/17   0231   17   0335   WBC  6.9  4.7*  4.6*   NEUTSPOLYS  73.00*  89.60*   --    LYMPHOCYTES  5.20*  10.40*   --    MONOCYTES  9.60  0.00   --    EOSINOPHILS  0.00  0.00   --    BASOPHILS  0.00  0.00   --            Hemodynamics:  Temp (24hrs), Av.5 °C (97.7 °F), Min:36.1 °C (96.9 °F), Max:37.1 °C (98.7 °F)  Temperature: 36.1 °C (96.9 °F)  Pulse  Av.6  Min: 58  Max: 111   Blood Pressure : 138/54 mmHg     Respiratory:    Respiration: 18, Pulse Oximetry: 95 %, O2 Daily Delivery Respiratory : Silicone Nasal Cannula     Given By:: Mouthpiece, Work Of Breathing / Effort: Mild  RUL Breath Sounds: Rhonchi, RML Breath Sounds: Rhonchi, RLL Breath Sounds: Rhonchi, MECCA Breath Sounds: Rhonchi, LLL Breath Sounds: Rhonchi  Fluids:    Intake/Output Summary (Last 24 hours) at 17 1200  Last data filed at 17 2000   Gross per 24 hour   Intake    300 ml   Output      0 ml   Net    300 ml     Weight: 52.3 kg (115 lb 4.8 oz)  GI/Nutrition:  Orders Placed This Encounter   Procedures   • Diet Order     Standing Status: Standing      Number of Occurrences: 1      Standing Expiration Date:      Order Specific Question:  Diet:     Answer:  Renal [8]     Medical Decision Making, by Problem:  Active Hospital Problems    Diagnosis   • Sepsis due to influenza B, postinfluenza bacterial pneumonia (CMS-HCC) [A41.9]   • Vasovagal syncope [R55]   • Acute respiratory failure with hypoxia due to influenza B, not due to sepsis (CMS-HCC) [J96.01]   • HLD (hyperlipidemia) [E78.5]   • Chronic thrombocytopenia (CMS-HCC) [D69.6]   • Hemorrhagic disorder due to extrinsic circulating anticoagulants (CMS-HCC) [D68.32]   • Essential hypertension [I10]   • End stage renal disease (CMS-HCC) [N18.6]   • Anemia of chronic disease [D63.8]   • History of DVT of lower extremity [Z86.718]       Labs reviewed and Medications reviewed                      Assessment and Plan    1.ESRD/HD -MWF  2.HTN: BP well controlled  3.Electrolytes: well  controlled.  4.Anemia: Hb at goal  5.PNA  6.Volume:well controlled    Diet: HD MWF          All meds to renal doses  HD today

## 2017-05-17 NOTE — CARE PLAN
Problem: Infection  Goal: Will remain free from infection  Outcome: PROGRESSING AS EXPECTED  Pt remains on PO antibiotics, Tamiflu    Problem: Fluid Volume:  Goal: Will maintain balanced intake and output  Outcome: PROGRESSING AS EXPECTED  MWF dialysis    Problem: Mobility  Goal: Risk for activity intolerance will decrease  Outcome: PROGRESSING AS EXPECTED  Pt was able to transfer from chair to bed with one assist this shift.

## 2017-05-17 NOTE — PROGRESS NOTES
Received report, assumed pt care. Pt a&o 1, VSS, Assessment completed. Resting comfortably in bed with call light, bedside table in reach. No c/o at this time. Side rails up 2. Instructed to use call light when needing to get OOB verbalized understanding, will reinforce. Bed alarm on, bed in low position. Will continue to monitor.

## 2017-05-17 NOTE — PROGRESS NOTES
Pt sleeping, VSS. Treaded socks, bed in low locked position, call bell in reach, frequent nursing rounds.

## 2017-05-17 NOTE — DISCHARGE PLANNING
Transportation has been arranged to transfer the patient to [facility] today at 1500 via the [facility] van. SW on floor Nadia has been notified via phone.

## 2017-05-17 NOTE — DISCHARGE PLANNING
Medical Social Work    Update: JAREK faxed transport [facility] to [facility]. Pt has finished course of tamiflu.

## 2017-05-17 NOTE — PROGRESS NOTES
Inpatient Anticoagulation Service Note  Date: 5/17/2017  Estimated Creatinine Clearance: 5.7 mL/min (by C-G formula based on Cr of 6.1).  Reason for Anticoagulation: Deep Vein Thrombosis   Hemoglobin Value: 11  Hematocrit Value: 34  Lab Platelet Value: 66  Target INR: 2.0 to 3.0  INR from last 7 days     Date/Time INR Value    05/17/17 0335 (!)2.99    05/16/17 0231 (!)3.39    05/15/17 0212 (!)2.01    05/14/17 0257 (!)1.3    05/13/17 0758 (!)1.25    05/12/17 1825 1.08        Dose from last 7 days     Date/Time Dose (mg)    05/17/17 1200 0    05/16/17 1100 0    05/15/17 1300 0    05/14/17 1100 4    05/13/17 1100 4    05/12/17 2300 4        Average Dose (mg): TBD (Home Dose: 4 mg daily per chart review, compliance concerns)  Significant Interactions: Antibiotics, Statin, Corticosteroids  Bridge Therapy: No   Reversal Agent Administered: Not Applicable  Comments: INR at goal range today. Continued warfarin interactions, including antimicrobials. H/H improved. PLT low/stable. No overt bleeding noted, provider following low PLT. Will give 2 mg tonight and trend INR with AM labs. May need dose adjustment pending clinical course.    Plan:  Warfarin 2 mg 5/17/17  Education Material Provided?: No  Pharmacist suggested discharge dosing: warfarin 2 mg daily (pending duration of antimicrobials; likely to resume home dosing thereafter; recommend INR 24-48 hours post discharge)    Kevin De Jesus, PHARMD

## 2017-05-17 NOTE — THERAPY
Occupational Therapy Contact Note    Attempted OT tx, pt receiving dialysis. Will attempt later as able.    Abril Arzate, OTR/L

## 2017-05-17 NOTE — PROGRESS NOTES
Bedside report completed. Pt in chair finishing dinner. Chair strip alarm on. Pt denies complaints.

## 2017-05-17 NOTE — DISCHARGE SUMMARY
CHIEF COMPLAINT ON ADMISSION  Chief Complaint   Patient presents with   • Body Aches   • Weakness   • Shortness of Breath   • Cough       CODE STATUS  Full Code    HPI & HOSPITAL COURSE  For H and P on admission, see full H and P dictated on 5/12/17 by Dr. Guzman for full details.  Briefly, this is an 81-year-old female who presented with complaints of fever, cough, and chills.  She had started feeling poorly 2 days prior with malaise and fatigue, then sarted to have fevers with shaking chills.  She also   developed cough, productive of yellow sputum with shortness of breath.  She does not use    oxygen at home, though she stated she was supposed to.  She was found to have influenza B as well as superimposed pneumonia.  She was treated with tamiflu and antibiotics, and now has been transitioned to oral augmentin.  She was on 6L O2 via nasal canula on 5/16, and has improved to just 2L now.  She is confused with intermittent lucidity, but has been calm and pleasant.  She was seen by PT, who suggested SNF for further strengthening, and has been accepted to [facility].    Therefore, she is discharged in fair and stable condition for further post-acute management.     SPECIFIC OUTPATIENT FOLLOW-UP  Respiratory cultures  Ongoing Dialysis  INR  Ongoing incentive spirometry    DISCHARGE PROBLEM LIST  Active Problems:    Acute respiratory failure with hypoxia due to influenza B, asthma exacerbation, not due to sepsis (CMS-HCC) POA: Yes    End stage renal disease (CMS-HCC) POA: Yes    Anemia of chronic disease POA: Yes    Essential hypertension POA: Yes    HLD (hyperlipidemia) POA: Yes    Chronic thrombocytopenia (CMS-HCC) POA: Yes    Hemorrhagic disorder due to extrinsic circulating anticoagulants (CMS-HCC) POA: Yes    History of DVT of lower extremity POA: Yes  Resolved Problems:    Sepsis due to influenza B, postinfluenza bacterial pneumonia (CMS-HCC) POA: Yes    Vasovagal syncope POA: No    Asthma exacerbation POA:  No    FOLLOW UP  Future Appointments  Date Time Provider Department Center   6/23/2017 9:00 AM Alistair Gandara M.D. [facility] None     Alistair Gandara M.D.  [address] [place] [id number] [phone]    Schedule an appointment as soon as possible for a visit in 2 weeks  Follow Up    Hospitalist at [facility]    MEDICATIONS ON DISCHARGE  Medication Information                      acetaminophen (TYLENOL) 325 MG Tab  Take 2 Tabs by mouth every 6 hours as needed (Mild Pain; (Pain scale 1-3); Temp greater than 100.5 F).             amoxicillin-clavulanate (AUGMENTIN) 500-125 MG Tab  Take 1 Tab by mouth every 24 hours for 5 days.             benzocaine-menthol (CEPACOL) 15-3.6 MG Lozenge  Spray 1 Lozenge in mouth/throat every 2 hours as needed.             guaifenesin DM (ROBITUSSIN DM) 100-10 MG/5ML Syrup syrup  Take 10 mL by mouth every 6 hours as needed for Cough.             losartan (COZAAR) 25 MG Tab  Take 25 mg by mouth every day.             metoprolol SR (TOPROL XL) 25 MG TABLET SR 24 HR  Take 25 mg by mouth every day.             ondansetron (ZOFRAN ODT) 4 MG TABLET DISPERSIBLE  Take 1 Tab by mouth every four hours as needed for Nausea/Vomiting (give PO if IV route is unavailable. May give per feeding tube.).             oseltamivir (TAMIFLU) 30 MG Cap  Take 1 Cap by mouth Once for 1 dose. At 2100 on 5/17             predniSONE (DELTASONE) 20 MG Tab  Take 20mg for 3 days then 10mg for 3 days then stop   taper          senna-docusate (PERICOLACE OR SENOKOT S) 8.6-50 MG Tab  Take 2 Tabs by mouth 2 Times a Day.             SENSIPAR 90 MG Tab  Take 1 Tab by mouth every day.             sevelamer (RENAGEL) 800 MG Tab  Take 1 Tab by mouth 2 times a day, with meals.             Sevelamer Carbonate 800 MG Tab  Take 1 Tab by mouth 2 Times a Day.             simvastatin (ZOCOR) 40 MG Tab  Take 40 mg by mouth every evening.             warfarin (COUMADIN) 2 MG Tab  Take 1 Tab by mouth COUMADIN-DAILY.  -Will need  to follow INR Lower dose than outpt              DIET  Orders Placed This Encounter   Procedures   • Diet Order     Standing Status: Standing      Number of Occurrences: 1      Standing Expiration Date:      Order Specific Question:  Diet:     Answer:  Renal [8]       ACTIVITY  As tolerated and directed by skilled nursing.  Class 2 - comfortable at rest but have symptoms with ordinary physcial activity.    LINES, DRAINS, AND WOUNDS  This is an automated list. Peripheral IVs will be removed prior to discharge.  PIV Group Left;Lower Forearm 22g Flexible Catheter;Saline Lock (Active)   Line Secured Taped;Transparent 5/17/2017 10:00 AM   Site Condition / Description Assessed;Patent;Clean;Dry;Intact 5/17/2017 10:00 AM   Dressing Type / Description Transparent;Clean;Dry;Intact 5/17/2017 10:00 AM   Dressing Status Observed 5/17/2017 10:00 AM   Saline Locked Yes 5/17/2017 10:00 AM   Infiltration Grading Used by [facility] and [facility] 0 5/17/2017 10:00 AM   Phlebitis Scale (Used by [facility]) 0 5/17/2017 10:00 AM   Date Primary Tubing Changed 05/16/17 5/16/2017  8:00 PM   Date Secondary Tubing Changed 05/16/17 5/16/2017  8:00 PM   NEXT Primary Tubing Change  05/16/17 5/16/2017  8:00 AM   NEXT Secondary Tubing Change  05/16/17 5/16/2017  8:00 AM       Dialysis Access Group Right;Upper Arm AV Fistula (Active)   Access Site Clean;Dry;Intact 5/17/2017 10:00 AM   Dressing Type / Description Clean;Dry;Intact 5/17/2017 10:00 AM   Dressing Status Observed 5/17/2017 10:00 AM   AV Fistula Bruit Present;Thrill Present 5/17/2017 10:00 AM   Dialysis Performed Yes 5/17/2017 10:00 AM   Next Dressing Change  05/15/17 5/12/2017  9:45 PM       Surgical Incision Group Right;Upper Arm Incision (Active)                  MENTAL STATUS ON TRANSFER  Level of Consciousness: Alert  Orientation : Disoriented to Event, Disoriented to Place, Disoriented to Time  Speech: Speech Clear    CONSULTATIONS  [facility]    PROCEDURES  None    LABORATORY  Lab  Results   Component Value Date/Time    SODIUM 130* 05/17/2017 03:35 AM    POTASSIUM 5.0 05/17/2017 03:35 AM    CHLORIDE 91* 05/17/2017 03:35 AM    CO2 29 05/17/2017 03:35 AM    GLUCOSE 129* 05/17/2017 03:35 AM    BUN 59* 05/17/2017 03:35 AM    CREATININE 6.10* 05/17/2017 03:35 AM        Lab Results   Component Value Date/Time    WBC 4.6* 05/17/2017 03:35 AM    HEMOGLOBIN 11.0* 05/17/2017 03:35 AM    HEMATOCRIT 34.0* 05/17/2017 03:35 AM    PLATELET COUNT 66* 05/17/2017 03:35 AM        Total time of the discharge process exceeds 38 minutes.

## 2017-05-17 NOTE — PROGRESS NOTES
Report called over to [facility], ext left with the receiving RN to call back with any questions.

## 2017-05-17 NOTE — PROGRESS NOTES
Pt D/C'd. PIV removed.  Discharge instructions provided in packet. Copy of discharge provided in packet.  Signed copy in chart (pt unable to sign due to confusion). Awaiting time from  for transport.

## 2017-05-17 NOTE — DIETARY
"Nutrition Services - Poor PO Follow Up    Diet: renal  Supplement: boost glucose control TID  PO: 25-50%    Pt on HD 3 x per week     Height: 157.5 cm (5' 2\")  Weight: 52.3 kg (115 lb 4.8 oz)  IBW: 50 Kg  Body mass index is 21.08 kg/(m^2).   Weight fluctuations likely partially r/t fluid     Pertinent Labs:   Recent Labs      05/16/17   0231  05/17/17   0335   SODIUM 101  136  130*   POTASSIUM 102  4.6  5.0     35*  59*   CREATININE 109  4.71*  6.10*   GLUCOSE 112  162*  129*   CALCIUM 105  9.2  9.5   WBC 1501  4.7*  4.6*   HGB 1503  10.4*  11.0*   HCT 1504  33.0*  34.0*   RBC 1502  3.34*  3.50*       Last BM: 05/16/17  Pertinent Medications: augmentin, cozaar, warfarin, metoprolol, tamiflu, renagel, zocor     Pt w/ generalized facial edema     Plan/Recommendation:      Encourage PO intake.     Consider diet liberalization to regular, avoid high potassium foods r/t age, poor PO and HD. Pt aware of need to avoid salt-laden foods. Pt reported not liking the renal diet. Spoke w/ RN, who will ask MD.     Nutrition Rep to see patient daily for meal preferences. Please document PO intake as percentage of meals consumed.     RD will continue to monitor           "

## 2017-05-17 NOTE — CARE PLAN
Problem: Discharge Barriers/Planning  Goal: Patient’s continuum of care needs will be met  [facility] to [facility]     Problem: Respiratory:  Goal: Respiratory status will improve  95% O2 sat on 2L O2 via nasal cannula

## 2017-05-17 NOTE — PROGRESS NOTES
Hd treatment ordered by Dr Najjar started at 1016 and ended at 1318 with net uf of 2000 ml as bp tolerated. Stable bp entire treatment with no significant change in condition post treatment. Dr Najjar in and seen pt while on hd. Held sites for 20 minutes post treatment, no bleeding noted. See flow sheet for details.

## 2017-05-17 NOTE — PROGRESS NOTES
Spoke with JON grandarmando Herbert at length about POC. He would like to talk with the  regarding placement longterm. I explained that a SNF is lined up and he is happy about that but he is anxious about after the SNF. I passed the message onto the night RN about him wanting to talk to  for long term placement, after the SNF.

## 2017-06-06 ENCOUNTER — PATIENT OUTREACH (OUTPATIENT)
Dept: HEALTH INFORMATION MANAGEMENT | Facility: OTHER | Age: 82
End: 2017-06-06

## 2017-06-11 ENCOUNTER — HOSPITAL ENCOUNTER (INPATIENT)
Facility: MEDICAL CENTER | Age: 82
LOS: 11 days | DRG: 377 | End: 2017-06-23
Attending: EMERGENCY MEDICINE | Admitting: HOSPITALIST
Payer: MEDICARE

## 2017-06-11 ENCOUNTER — RESOLUTE PROFESSIONAL BILLING HOSPITAL PROF FEE (OUTPATIENT)
Dept: HOSPITALIST | Facility: MEDICAL CENTER | Age: 82
End: 2017-06-11
Payer: MEDICARE

## 2017-06-11 DIAGNOSIS — G89.29 CHRONIC PAIN OF LEFT KNEE: Chronic | ICD-10-CM

## 2017-06-11 DIAGNOSIS — N18.6 END STAGE RENAL DISEASE (HCC): ICD-10-CM

## 2017-06-11 DIAGNOSIS — K92.1 HEMATOCHEZIA: ICD-10-CM

## 2017-06-11 DIAGNOSIS — N18.6 ESRD (END STAGE RENAL DISEASE) ON DIALYSIS (HCC): ICD-10-CM

## 2017-06-11 DIAGNOSIS — Z99.2 ESRD (END STAGE RENAL DISEASE) ON DIALYSIS (HCC): ICD-10-CM

## 2017-06-11 DIAGNOSIS — D63.8 ANEMIA IN OTHER CHRONIC DISEASES CLASSIFIED ELSEWHERE: ICD-10-CM

## 2017-06-11 DIAGNOSIS — K62.5 RECTAL BLEED: ICD-10-CM

## 2017-06-11 DIAGNOSIS — M25.562 CHRONIC PAIN OF LEFT KNEE: Chronic | ICD-10-CM

## 2017-06-11 LAB
ALBUMIN SERPL BCP-MCNC: 3 G/DL (ref 3.2–4.9)
ALBUMIN/GLOB SERPL: 1 G/DL
ALP SERPL-CCNC: 135 U/L (ref 30–99)
ALT SERPL-CCNC: 12 U/L (ref 2–50)
ANION GAP SERPL CALC-SCNC: 5 MMOL/L (ref 0–11.9)
APTT PPP: 32 SEC (ref 24.7–36)
AST SERPL-CCNC: 24 U/L (ref 12–45)
BASOPHILS # BLD AUTO: 0.6 % (ref 0–1.8)
BASOPHILS # BLD: 0.03 K/UL (ref 0–0.12)
BILIRUB SERPL-MCNC: 0.6 MG/DL (ref 0.1–1.5)
BUN SERPL-MCNC: 30 MG/DL (ref 8–22)
CALCIUM SERPL-MCNC: 10.7 MG/DL (ref 8.5–10.5)
CHLORIDE SERPL-SCNC: 99 MMOL/L (ref 96–112)
CO2 SERPL-SCNC: 34 MMOL/L (ref 20–33)
CREAT SERPL-MCNC: 5.26 MG/DL (ref 0.5–1.4)
EOSINOPHIL # BLD AUTO: 0.31 K/UL (ref 0–0.51)
EOSINOPHIL NFR BLD: 6.6 % (ref 0–6.9)
ERYTHROCYTE [DISTWIDTH] IN BLOOD BY AUTOMATED COUNT: 59.1 FL (ref 35.9–50)
GFR SERPL CREATININE-BSD FRML MDRD: 8 ML/MIN/1.73 M 2
GLOBULIN SER CALC-MCNC: 3.1 G/DL (ref 1.9–3.5)
GLUCOSE SERPL-MCNC: 90 MG/DL (ref 65–99)
HCT VFR BLD AUTO: 28.7 % (ref 37–47)
HGB BLD-MCNC: 8.6 G/DL (ref 12–16)
HGB BLD-MCNC: 8.8 G/DL (ref 12–16)
IMM GRANULOCYTES # BLD AUTO: 0.01 K/UL (ref 0–0.11)
IMM GRANULOCYTES NFR BLD AUTO: 0.2 % (ref 0–0.9)
INR PPP: 1.24 (ref 0.87–1.13)
LYMPHOCYTES # BLD AUTO: 0.81 K/UL (ref 1–4.8)
LYMPHOCYTES NFR BLD: 17.3 % (ref 22–41)
MCH RBC QN AUTO: 31.4 PG (ref 27–33)
MCHC RBC AUTO-ENTMCNC: 30.7 G/DL (ref 33.6–35)
MCV RBC AUTO: 102.5 FL (ref 81.4–97.8)
MONOCYTES # BLD AUTO: 0.58 K/UL (ref 0–0.85)
MONOCYTES NFR BLD AUTO: 12.4 % (ref 0–13.4)
NEUTROPHILS # BLD AUTO: 2.95 K/UL (ref 2–7.15)
NEUTROPHILS NFR BLD: 62.9 % (ref 44–72)
NRBC # BLD AUTO: 0 K/UL
NRBC BLD AUTO-RTO: 0 /100 WBC
PLATELET # BLD AUTO: 116 K/UL (ref 164–446)
PMV BLD AUTO: 11.1 FL (ref 9–12.9)
POTASSIUM SERPL-SCNC: 4.2 MMOL/L (ref 3.6–5.5)
PROT SERPL-MCNC: 6.1 G/DL (ref 6–8.2)
PROTHROMBIN TIME: 16 SEC (ref 12–14.6)
RBC # BLD AUTO: 2.8 M/UL (ref 4.2–5.4)
SODIUM SERPL-SCNC: 138 MMOL/L (ref 135–145)
TROPONIN I SERPL-MCNC: 0.06 NG/ML (ref 0–0.04)
WBC # BLD AUTO: 4.7 K/UL (ref 4.8–10.8)

## 2017-06-11 PROCEDURE — 96374 THER/PROPH/DIAG INJ IV PUSH: CPT

## 2017-06-11 PROCEDURE — 96361 HYDRATE IV INFUSION ADD-ON: CPT

## 2017-06-11 PROCEDURE — 85018 HEMOGLOBIN: CPT

## 2017-06-11 PROCEDURE — G0378 HOSPITAL OBSERVATION PER HR: HCPCS

## 2017-06-11 PROCEDURE — 304561 HCHG STAT O2

## 2017-06-11 PROCEDURE — 700111 HCHG RX REV CODE 636 W/ 250 OVERRIDE (IP): Performed by: EMERGENCY MEDICINE

## 2017-06-11 PROCEDURE — 700105 HCHG RX REV CODE 258: Performed by: EMERGENCY MEDICINE

## 2017-06-11 PROCEDURE — 99220 PR INITIAL OBSERVATION CARE,LEVL III: CPT | Performed by: HOSPITALIST

## 2017-06-11 PROCEDURE — 85025 COMPLETE CBC W/AUTO DIFF WBC: CPT

## 2017-06-11 PROCEDURE — 80053 COMPREHEN METABOLIC PANEL: CPT

## 2017-06-11 PROCEDURE — 84484 ASSAY OF TROPONIN QUANT: CPT

## 2017-06-11 PROCEDURE — 94760 N-INVAS EAR/PLS OXIMETRY 1: CPT

## 2017-06-11 PROCEDURE — 85610 PROTHROMBIN TIME: CPT

## 2017-06-11 PROCEDURE — 99285 EMERGENCY DEPT VISIT HI MDM: CPT

## 2017-06-11 PROCEDURE — 85730 THROMBOPLASTIN TIME PARTIAL: CPT

## 2017-06-11 RX ORDER — ONDANSETRON 4 MG/1
4 TABLET, ORALLY DISINTEGRATING ORAL EVERY 4 HOURS PRN
Status: DISCONTINUED | OUTPATIENT
Start: 2017-06-11 | End: 2017-06-23 | Stop reason: HOSPADM

## 2017-06-11 RX ORDER — ONDANSETRON 2 MG/ML
4 INJECTION INTRAMUSCULAR; INTRAVENOUS ONCE
Status: COMPLETED | OUTPATIENT
Start: 2017-06-11 | End: 2017-06-11

## 2017-06-11 RX ORDER — METOPROLOL SUCCINATE 25 MG/1
25 TABLET, EXTENDED RELEASE ORAL DAILY
Status: DISCONTINUED | OUTPATIENT
Start: 2017-06-12 | End: 2017-06-17

## 2017-06-11 RX ORDER — SODIUM CHLORIDE 9 MG/ML
1000 INJECTION, SOLUTION INTRAVENOUS ONCE
Status: COMPLETED | OUTPATIENT
Start: 2017-06-11 | End: 2017-06-12

## 2017-06-11 RX ORDER — SIMVASTATIN 20 MG
40 TABLET ORAL NIGHTLY
Status: DISCONTINUED | OUTPATIENT
Start: 2017-06-11 | End: 2017-06-23 | Stop reason: HOSPADM

## 2017-06-11 RX ORDER — BISACODYL 10 MG
10 SUPPOSITORY, RECTAL RECTAL
Status: DISCONTINUED | OUTPATIENT
Start: 2017-06-11 | End: 2017-06-23 | Stop reason: HOSPADM

## 2017-06-11 RX ORDER — ONDANSETRON 2 MG/ML
4 INJECTION INTRAMUSCULAR; INTRAVENOUS EVERY 4 HOURS PRN
Status: DISCONTINUED | OUTPATIENT
Start: 2017-06-11 | End: 2017-06-23 | Stop reason: HOSPADM

## 2017-06-11 RX ORDER — AMOXICILLIN 250 MG
2 CAPSULE ORAL 2 TIMES DAILY
Status: DISCONTINUED | OUTPATIENT
Start: 2017-06-11 | End: 2017-06-21

## 2017-06-11 RX ORDER — SODIUM CHLORIDE 9 MG/ML
1000 INJECTION, SOLUTION INTRAVENOUS ONCE
Status: COMPLETED | OUTPATIENT
Start: 2017-06-11 | End: 2017-06-11

## 2017-06-11 RX ORDER — POLYETHYLENE GLYCOL 3350 17 G/17G
1 POWDER, FOR SOLUTION ORAL
Status: DISCONTINUED | OUTPATIENT
Start: 2017-06-11 | End: 2017-06-23 | Stop reason: HOSPADM

## 2017-06-11 RX ORDER — LOSARTAN POTASSIUM 50 MG/1
25 TABLET ORAL DAILY
Status: DISCONTINUED | OUTPATIENT
Start: 2017-06-12 | End: 2017-06-17

## 2017-06-11 RX ORDER — OMEPRAZOLE 20 MG/1
20 CAPSULE, DELAYED RELEASE ORAL 2 TIMES DAILY
Status: DISCONTINUED | OUTPATIENT
Start: 2017-06-11 | End: 2017-06-20

## 2017-06-11 RX ORDER — SEVELAMER HYDROCHLORIDE 800 MG/1
800 TABLET, FILM COATED ORAL 2 TIMES DAILY
Status: DISCONTINUED | OUTPATIENT
Start: 2017-06-11 | End: 2017-06-15

## 2017-06-11 RX ADMIN — SODIUM CHLORIDE 1000 ML: 9 INJECTION, SOLUTION INTRAVENOUS at 20:56

## 2017-06-11 RX ADMIN — ONDANSETRON 4 MG: 2 INJECTION INTRAMUSCULAR; INTRAVENOUS at 20:56

## 2017-06-11 ASSESSMENT — ENCOUNTER SYMPTOMS
VOMITING: 1
ABDOMINAL PAIN: 0
NAUSEA: 0
BLOOD IN STOOL: 1
CONSTIPATION: 1
ROS GI COMMENTS: NEGATIVE FOR HEMATEMESIS.
DIARRHEA: 1
SHORTNESS OF BREATH: 0

## 2017-06-11 NOTE — IP AVS SNAPSHOT
" <p align=\"LEFT\"><IMG SRC=\"//[email or web address]$/[email or web address]\" alt=\"Image\" WIDTH=\"50%\" HEIGHT=\"200\" BORDER=\"\"></p>                   Name:Skye Pateson  Medical Record Number:[id number]  CSN: [id number]    YOB: 1935   Age: 82 y.o.  Sex: female  HT:1.575 m (5' 2\") WT: 52.5 kg (115 lb 11.9 oz)          Admit Date: 6/11/2017     Discharge Date: 6/23/2017  Today's Date: 6/23/2017  Attending Doctor:  No att. providers found                  Allergies:  Nkda          Your appointments     Jun 24, 2017  8:00 AM   Adult Draw/Collection with LAB [facility]   LAB - [facility] (--)    [address]  [place] [address] [phone]              Follow-up Information     1. Follow up with Alistair Gandara M.D.. Schedule an appointment as soon as possible for a visit in 2 weeks.    Specialty:  Internal Medicine    Why:  Hospital follow-up appointment with PCP    Contact information    [address]  [place] [address] [phone]           Medication List      Take these Medications        Instructions    acetaminophen 325 MG Tabs   Commonly known as:  TYLENOL    Take 2 Tabs by mouth every 6 hours as needed for Moderate Pain.   Dose:  650 mg       ferrous sulfate 325 (65 FE) MG tablet    Take 1 Tab by mouth every morning with breakfast.   Dose:  325 mg       hydrALAZINE 10 MG Tabs   Commonly known as:  APRESOLINE    Take 1 Tab by mouth every 8 hours.   Dose:  10 mg       isosorbide dinitrate 10 MG Tabs   Commonly known as:  ISORDIL    Take 1 Tab by mouth 3 times a day.   Dose:  10 mg       lactobacillus granules Pack    Take 2 Packets by mouth 3 times a day, with meals.   Dose:  2 Packet       lidocaine 1 % Soln   Commonly known as:  XYLOCAINE    1 mL by Intradermal route as needed (as needed for Dialysis).   Dose:  1 mL       losartan 25 MG Tabs   Commonly known as:  COZAAR    Take 25 mg by mouth every day.   Dose:  25 mg       metoprolol SR 25 MG Tb24   Commonly known as:  TOPROL XL    Take 25 mg by " mouth every day.   Dose:  25 mg       omeprazole 20 MG delayed-release capsule   Commonly known as:  PRILOSEC    Take 1 Cap by mouth 2 times daily, before breakfast and dinner.   Dose:  20 mg       SENSIPAR 90 MG Tabs   Generic drug:  Cinacalcet HCl    Take 1 Tab by mouth every day.   Dose:  1 Tab       Sevelamer Carbonate 800 MG Tabs    Take 1 Tab by mouth 2 Times a Day.   Dose:  1 Tab       simvastatin 40 MG Tabs   Commonly known as:  ZOCOR    Take 40 mg by mouth every evening.   Dose:  40 mg       warfarin 2.5 MG Tabs   Commonly known as:  COUMADIN    Take 1 Tab by mouth COUMADIN-DAILY.   Dose:  2.5 mg

## 2017-06-11 NOTE — IP AVS SNAPSHOT
6/23/2017    Skye James  [address]  [place] [address]    Dear Skye:    [facility] the [facility] wants to ensure your discharge home is safe and you or your loved ones have had all of your questions answered regarding your care after you leave the hospital.    Below is a list of resources and contact information should you have any questions regarding your hospital stay, follow-up instructions, or active medical symptoms.    Questions or Concerns Regarding… Contact   Medical Questions Related to Your Discharge  (7 days a week, 8am-5pm) Contact a Nurse Care Coordinator   [phone]   Medical Questions Not Related to Your Discharge  (24 hours a day / 7 days a week)  Contact the Nurse Health Line   [phone]    Medications or Discharge Instructions Refer to your discharge packet   or contact your [facility] Primary Care Provider   [phone]   Follow-up Appointment(s) Schedule your appointment via [employer]   or contact Scheduling [phone]   Billing Review your statement via [employer]  or contact Billing [phone]   Medical Records Review your records via [employer]   or contact Medical Records [phone]     You may receive a telephone call within two days of discharge. This call is to make certain you understand your discharge instructions and have the opportunity to have any questions answered. You can also easily access your medical information, test results and upcoming appointments via the [employer] free online health management tool. You can learn more and sign up at [employer]. For assistance setting up your [employer] account, please call [phone].    Once again, we want to ensure your discharge home is safe and that you have a clear understanding of any next steps in your care. If you have any questions or concerns, please do not hesitate to contact us, we are here for you. Thank you for choosing [facility] for your healthcare needs.    Sincerely,    Your [facility] Healthcare Team

## 2017-06-11 NOTE — IP AVS SNAPSHOT
" Home Care Instructions                                                                                                                  Name:Skye Pateson  Medical Record Number:[id number]  CSN: [id number]    YOB: 1935   Age: 82 y.o.  Sex: female  HT:1.575 m (5' 2\") WT: 52.5 kg (115 lb 11.9 oz)          Admit Date: 6/11/2017     Discharge Date: 6/23/2017  Today's Date: 6/23/2017  Attending Doctor:  No att. providers found                  Allergies:  Nkda            Discharge Instructions       Discharge Instructions    Discharged to other by [facility] with escort. Discharged via ambulance, hospital escort: Yes.  Special equipment needed: Oxygen    Be sure to schedule a follow-up appointment with your primary care doctor or any specialists as instructed.     Discharge Plan:   Influenza Vaccine Indication: Patient Refuses    I understand that a diet low in cholesterol, fat, and sodium is recommended for good health. Unless I have been given specific instructions below for another diet, I accept this instruction as my diet prescription.   Other diet: Renal    Special Instructions: None    · Is patient discharged on Warfarin / Coumadin?   Yes    You are receiving the drug warfarin. Please understand the importance of monitoring warfarin with scheduled PT/INR blood draws.  Follow-up with the [facility] in one week for INR lab..    IMPORTANT: HOW TO USE THIS INFORMATION:  This is a summary and does NOT have all possible information about this product. This information does not assure that this product is safe, effective, or appropriate for you. This information is not individual medical advice and does not substitute for the advice of your health care professional. Always ask your health care professional for complete information about this product and your specific health needs.      WARFARIN - ORAL (WARF-uh-rin)      COMMON BRAND NAME(S): Coumadin      WARNING:  Warfarin can cause very serious " "(possibly fatal) bleeding. This is more likely to occur when you first start taking this medication or if you take too much warfarin. To decrease your risk for bleeding, your doctor or other health care provider will monitor you closely and check your lab results (INR test) to make sure you are not taking too much warfarin. Keep all medical and laboratory appointments. Tell your doctor right away if you notice any signs of serious bleeding. See also Side Effects section.      USES:  This medication is used to treat blood clots (such as in deep vein thrombosis-DVT or pulmonary embolus-PE) and/or to prevent new clots from forming in your body. Preventing harmful blood clots helps to reduce the risk of a stroke or heart attack. Conditions that increase your risk of developing blood clots include a certain type of irregular heart rhythm (atrial fibrillation), heart valve replacement, recent heart attack, and certain surgeries (such as hip/knee replacement). Warfarin is commonly called a \"blood thinner,\" but the more correct term is \"anticoagulant.\" It helps to keep blood flowing smoothly in your body by decreasing the amount of certain substances (clotting proteins) in your blood.      HOW TO USE:  Read the Medication Guide provided by your pharmacist before you start taking warfarin and each time you get a refill. If you have any questions, ask your doctor or pharmacist. Take this medication by mouth with or without food as directed by your doctor or other health care professional, usually once a day. It is very important to take it exactly as directed. Do not increase the dose, take it more frequently, or stop using it unless directed by your doctor. Dosage is based on your medical condition, laboratory tests (such as INR), and response to treatment. Your doctor or other health care provider will monitor you closely while you are taking this medication to determine the right dose for you. Use this medication regularly " to get the most benefit from it. To help you remember, take it at the same time each day. It is important to eat a balanced, consistent diet while taking warfarin. Some foods can affect how warfarin works in your body and may affect your treatment and dose. Avoid sudden large increases or decreases in your intake of foods high in vitamin K (such as broccoli, cauliflower, cabbage, brussels sprouts, kale, spinach, and other green leafy vegetables, liver, green tea, certain vitamin supplements). If you are trying to lose weight, check with your doctor before you try to go on a diet. Cranberry products may also affect how your warfarin works. Limit the amount of cranberry juice (16 ounces/480 milliliters a day) or other cranberry products you may drink or eat.      SIDE EFFECTS:  Nausea, loss of appetite, or stomach/abdominal pain may occur. If any of these effects persist or worsen, tell your doctor or pharmacist promptly. Remember that your doctor has prescribed this medication because he or she has judged that the benefit to you is greater than the risk of side effects. Many people using this medication do not have serious side effects. This medication can cause serious bleeding if it affects your blood clotting proteins too much (shown by unusually high INR lab results). Even if your doctor stops your medication, this risk of bleeding can continue for up to a week. Tell your doctor right away if you have any signs of serious bleeding, including: unusual pain/swelling/discomfort, unusual/easy bruising, prolonged bleeding from cuts or gums, persistent/frequent nosebleeds, unusually heavy/prolonged menstrual flow, pink/dark urine, coughing up blood, vomit that is bloody or looks like coffee grounds, severe headache, dizziness/fainting, unusual or persistent tiredness/weakness, bloody/black/tarry stools, chest pain, shortness of breath, difficulty swallowing. Tell your doctor right away if any of these unlikely but  serious side effects occur: persistent nausea/vomiting, severe stomach/abdominal pain, yellowing eyes/skin. This drug rarely has caused very serious (possibly fatal) problems if its effects lead to small blood clots (usually at the beginning of treatment). This can lead to severe skin/tissue damage that may require surgery or amputation if left untreated. Patients with certain blood conditions (protein C or S deficiency) may be at greater risk. Get medical help right away if any of these rare but serious side effects occur: painful/red/purplish patches on the skin (such as on the toe, breast, abdomen), change in the amount of urine, vision changes, confusion, slurred speech, weakness on one side of the body. A very serious allergic reaction to this drug is rare. However, get medical help right away if you notice any symptoms of a serious allergic reaction, including: rash, itching/swelling (especially of the face/tongue/throat), severe dizziness, trouble breathing. This is not a complete list of possible side effects. If you notice other effects not listed above, contact your doctor or pharmacist. In the [place] - Call your doctor for medical advice about side effects. You may report side effects to FDA at 0-634-HRD-4142. In [place] - Call your doctor for medical advice about side effects. You may report side effects to [facility] at 1-517.956.3129.      PRECAUTIONS:  Before taking warfarin, tell your doctor or pharmacist if you are allergic to it; or if you have any other allergies. This product may contain inactive ingredients, which can cause allergic reactions or other problems. Talk to your pharmacist for more details. Before using this medication, tell your doctor or pharmacist your medical history, especially of: blood disorders (such as anemia, hemophilia), bleeding problems (such as bleeding of the stomach/intestines, bleeding in the brain), blood vessel disorders (such as aneurysms), recent major  injury/surgery, liver disease, alcohol use, mental/mood disorders (including memory problems), frequent falls/injuries. It is important that all your doctors and dentists know that you take warfarin. Before having surgery or any medical/dental procedures, tell your doctor or dentist that you are taking this medication and about all the products you use (including prescription drugs, nonprescription drugs, and herbal products). Avoid getting injections into the muscles. If you must have an injection into a muscle (for example, a flu shot), it should be given in the arm. This way, it will be easier to check for bleeding and/or apply pressure bandages. This medication may cause stomach bleeding. Daily use of alcohol while using this medicine will increase your risk for stomach bleeding and may also affect how this medication works. Limit or avoid alcoholic beverages. If you have not been eating well, if you have an illness or infection that causes fever, vomiting, or diarrhea for more than 2 days, or if you start using any antibiotic medications, contact your doctor or pharmacist immediately because these conditions can affect how warfarin works. This medication can cause heavy bleeding. To lower the chance of getting cut, bruised, or injured, use great caution with sharp objects like safety razors and nail cutters. Use an electric razor when shaving and a soft toothbrush when brushing your teeth. Avoid activities such as contact sports. If you fall or injure yourself, especially if you hit your head, call your doctor immediately. Your doctor may need to check you. The Food & Drug Administration has stated that generic warfarin products are interchangeable. However, consult your doctor or pharmacist before switching warfarin products. Be careful not to take more than one medication that contains warfarin unless specifically directed by the doctor or health care provider who is monitoring your warfarin treatment. Older  "adults may be at greater risk for bleeding while using this drug. This medication is not recommended for use during pregnancy because of serious (possibly fatal) harm to an unborn baby. Discuss the use of reliable forms of birth control with your doctor. If you become pregnant or think you may be pregnant, tell your doctor immediately. If you are planning pregnancy, discuss a plan for managing your condition with your doctor before you become pregnant. Your doctor may switch the type of medication you use during pregnancy. Very small amounts of this medication may pass into breast milk but is unlikely to harm a nursing infant. Consult your doctor before breast-feeding.      DRUG INTERACTIONS:  Drug interactions may change how your medications work or increase your risk for serious side effects. This document does not contain all possible drug interactions. Keep a list of all the products you use (including prescription/nonprescription drugs and herbal products) and share it with your doctor and pharmacist. Do not start, stop, or change the dosage of any medicines without your doctor's approval. Warfarin interacts with many prescription, nonprescription, vitamin, and herbal products. This includes medications that are applied to the skin or inside the vagina or rectum. The interactions with warfarin usually result in an increase or decrease in the \"blood-thinning\" (anticoagulant) effect. Your doctor or other health care professional should closely monitor you to prevent serious bleeding or clotting problems. While taking warfarin, it is very important to tell your doctor or pharmacist of any changes in medications, vitamins, or herbal products that you are taking. Some products that may interact with this drug include: capecitabine, imatinib, mifepristone. Aspirin, aspirin-like drugs (salicylates), and nonsteroidal anti-inflammatory drugs (NSAIDs such as ibuprofen, naproxen, celecoxib) may have effects similar to " warfarin. These drugs may increase the risk of bleeding problems if taken during treatment with warfarin. Carefully check all prescription/nonprescription product labels (including drugs applied to the skin such as pain-relieving creams) since the products may contain NSAIDs or salicylates. Talk to your doctor about using a different medication (such as acetaminophen) to treat pain/fever. Low-dose aspirin and related drugs (such as clopidogrel, ticlopidine) should be continued if prescribed by your doctor for specific medical reasons such as heart attack or stroke prevention. Consult your doctor or pharmacist for more details. Many herbal products interact with warfarin. Tell your doctor before taking any herbal products, especially bromelains, coenzyme Q10, cranberry, danshen, dong quai, fenugreek, garlic, ginkgo biloba, ginseng, and Becky's wort, among others. This medication may interfere with a certain laboratory test to measure theophylline levels, possibly causing false test results. Make sure laboratory personnel and all your doctors know you use this drug.      OVERDOSE:  If overdose is suspected, contact a poison control center or emergency room immediately. [place] residents can call the [place] National Poison Hotline at 1-521.592.8731. [place] residents can call a provincial poison control center. Symptoms of overdose may include: bloody/black/tarry stools, pink/dark urine, unusual/prolonged bleeding.      NOTES:  Do not share this medication with others. Laboratory and/or medical tests (such as INR, complete blood count) must be performed periodically to monitor your progress or check for side effects. Consult your doctor for more details.      MISSED DOSE:  For the best possible benefit, do not miss any doses. If you do miss a dose and remember on the same day, take it as soon as you remember. If you remember on the next day, skip the missed dose and resume your usual dosing schedule. Do not double the dose to  catch up because this could increase your risk for bleeding. Keep a record of missed doses to give to your doctor or pharmacist. Contact your doctor or pharmacist if you miss 2 or more doses in a row.      STORAGE:  Store at room temperature away from light and moisture. Do not store in the bathroom. Keep all medications away from children and pets. Do not flush medications down the toilet or pour them into a drain unless instructed to do so. Properly discard this product when it is  or no longer needed. Consult your pharmacist or local waste disposal company for more details about how to safely discard your product.      MEDICAL ALERT:  Your condition and medication can cause complications in a medical emergency. For information about enrolling in MedicAlert, call 1-181.189.3932 ([place]) or 1-842.264.4690 ([place]).      Information last revised 2010 Copyright(c)  [employer]             · Is patient Post Blood Transfusion?  No    Depression / Suicide Risk    As you are discharged from this [facility] facility, it is important to learn how to keep safe from harming yourself.    Recognize the warning signs:  · Abrupt changes in personality, positive or negative- including increase in energy   · Giving away possessions  · Change in eating patterns- significant weight changes-  positive or negative  · Change in sleeping patterns- unable to sleep or sleeping all the time   · Unwillingness or inability to communicate  · Depression  · Unusual sadness, discouragement and loneliness  · Talk of wanting to die  · Neglect of personal appearance   · Rebelliousness- reckless behavior  · Withdrawal from people/activities they love  · Confusion- inability to concentrate     If you or a loved one observes any of these behaviors or has concerns about self-harm, here's what you can do:  · Talk about it- your feelings and reasons for harming yourself  · Remove any means that you might use to hurt yourself  (examples: pills, rope, extension cords, firearm)  · Get professional help from the community (Mental Health, Substance Abuse, psychological counseling)  · Do not be alone:Call your Safe Contact- someone whom you trust who will be there for you.  · Call your local CRISIS HOTLINE 226-9212 or 499-357-1795  · Call your local Children's Mobile Crisis Response Team [place] (772) 124-8111 or www.[employer]  · Call the toll free National Suicide Prevention Hotlines   · National Suicide Prevention Lifeline 928-592-AFCJ (2644)  · [place] Hope Line Network 800-SUICIDE (244-6908)        Your appointments     Jun 24, 2017  8:00 AM   Adult Draw/Collection with LAB [facility]   LAB - [facility] (--)    [address]  [place] [address] [phone]              Follow-up Information     1. Follow up with Alistair Gandara M.D.. Schedule an appointment as soon as possible for a visit in 2 weeks.    Specialty:  Internal Medicine    Why:  Hospital follow-up appointment with PCP    Contact information    [address]  [place] [address] [phone]           Discharge Medication Instructions:    Below are the medications your physician expects you to take upon discharge:    Review all your home medications and newly ordered medications with your doctor and/or pharmacist. Follow medication instructions as directed by your doctor and/or pharmacist.    Please keep your medication list with you and share with your physician.               Medication List      START taking these medications        Instructions    Morning Afternoon Evening Bedtime    acetaminophen 325 MG Tabs   Last time this was given:  650 mg on 6/19/2017 12:26 PM   Commonly known as:  TYLENOL   Next Dose Due:  When needed        Take 2 Tabs by mouth every 6 hours as needed for Moderate Pain.   Dose:  650 mg                        ferrous sulfate 325 (65 FE) MG tablet   Last time this was given:  325 mg on 6/23/2017 12:20 PM   Next Dose Due:   6/24/17        Take 1 Tab by mouth every morning with breakfast.   Dose:  325 mg                        hydrALAZINE 10 MG Tabs   Commonly known as:  APRESOLINE   Next Dose Due:  6/23/17 10pm        Take 1 Tab by mouth every 8 hours.   Dose:  10 mg                        isosorbide dinitrate 10 MG Tabs   Commonly known as:  ISORDIL   Next Dose Due:  6/23/17 10pm        Take 1 Tab by mouth 3 times a day.   Dose:  10 mg                        lactobacillus granules Pack   Last time this was given:  2 Packets on 6/23/2017 12:15 PM   Next Dose Due:  6/23/27 5pm        Take 2 Packets by mouth 3 times a day, with meals.   Dose:  2 Packet                        lidocaine 1 % Soln   Last time this was given:  1 mL on 6/14/2017  7:40 AM   Commonly known as:  XYLOCAINE        1 mL by Intradermal route as needed (as needed for Dialysis).   Dose:  1 mL                        omeprazole 20 MG delayed-release capsule   Last time this was given:  20 mg on 6/23/2017  5:21 AM   Commonly known as:  PRILOSEC   Next Dose Due:  6/23/17 5pm        Take 1 Cap by mouth 2 times daily, before breakfast and dinner.   Dose:  20 mg                        warfarin 2.5 MG Tabs   Last time this was given:  2.5 mg on 6/22/2017  5:50 PM   Commonly known as:  COUMADIN   Next Dose Due:  6/23/17 6pm        Take 1 Tab by mouth COUMADIN-DAILY.   Dose:  2.5 mg                          CONTINUE taking these medications        Instructions    Morning Afternoon Evening Bedtime    losartan 25 MG Tabs   Last time this was given:  25 mg on 6/23/2017 12:15 PM   Commonly known as:  COZAAR   Next Dose Due:  6/24/17        Take 25 mg by mouth every day.   Dose:  25 mg                        metoprolol SR 25 MG Tb24   Last time this was given:  25 mg on 6/23/2017 12:15 PM   Commonly known as:  TOPROL XL   Next Dose Due:  6/24/17        Take 25 mg by mouth every day.   Dose:  25 mg                        SENSIPAR 90 MG Tabs   Generic drug:  Cinacalcet HCl   Next Dose  Due:  6/24/17        Take 1 Tab by mouth every day.   Dose:  1 Tab                        Sevelamer Carbonate 800 MG Tabs   Next Dose Due:  6/23/17 5pm        Take 1 Tab by mouth 2 Times a Day.   Dose:  1 Tab                        simvastatin 40 MG Tabs   Last time this was given:  40 mg on 6/22/2017  8:53 PM   Commonly known as:  ZOCOR   Next Dose Due:  6/23/17 10pm        Take 40 mg by mouth every evening.   Dose:  40 mg                             Where to Get Your Medications      Information about where to get these medications is not yet available     ! Ask your nurse or doctor about these medications    - acetaminophen 325 MG Tabs  - ferrous sulfate 325 (65 FE) MG tablet  - hydrALAZINE 10 MG Tabs  - isosorbide dinitrate 10 MG Tabs  - lactobacillus granules Pack  - lidocaine 1 % Soln  - omeprazole 20 MG delayed-release capsule  - warfarin 2.5 MG Tabs            Orders for after discharge     REFERRAL TO PHYSIATRY (PMR)    Complete by:  As directed        REFERRAL TO SKILLED NURSING FACILITY    Complete by:  As directed              Instructions           Diet / Nutrition:    Follow any diet instructions given to you by your doctor or the dietician, including how much salt (sodium) you are allowed each day.    If you are overweight, talk to your doctor about a weight reduction plan.    Activity:    Remain physically active following your doctor's instructions about exercise and activity.    Rest often.     Any time you become even a little tired or short of breath, SIT DOWN and rest.    Worsening Symptoms:    Report any of the following signs and symptoms to the doctor's office immediately:    *Pain of jaw, arm, or neck  *Chest pain not relieved by medication                               *Dizziness or loss of consciousness  *Difficulty breathing even when at rest   *More tired than usual                                       *Bleeding drainage or swelling of surgical site  *Swelling of feet, ankles, legs or  stomach                 *Fever (>100ºF)  *Pink or blood tinged sputum  *Weight gain (3lbs/day or 5lbs /week)           *Shock from internal defibrillator (if applicable)  *Palpitations or irregular heartbeats                *Cool and/or numb extremities    Stroke Awareness    Common Risk Factors for Stroke include:    Age  Atrial Fibrillation  Carotid Artery Stenosis  Diabetes Mellitus  Excessive alcohol consumption  High blood pressure  Overweight   Physical inactivity  Smoking    Warning signs and symptoms of a stroke include:    *Sudden numbness or weakness of the face, arm or leg (especially on one side of the body).  *Sudden confusion, trouble speaking or understanding.  *Sudden trouble seeing in one or both eyes.  *Sudden trouble walking, dizziness, loss of balance or coordination.Sudden severe headache with no known cause.    It is very important to get treatment quickly when a stroke occurs. If you experience any of the above warning signs, call 911 immediately.                   Disclaimer         Quit Smoking / Tobacco Use:    I understand the use of any tobacco products increases my chance of suffering from future heart disease or stroke and could cause other illnesses which may shorten my life. Quitting the use of tobacco products is the single most important thing I can do to improve my health. For further information on smoking / tobacco cessation call a Toll Free Quit Line at 1-974.184.8764 (*[employer] [place]) or 1-313.343.3152 (American Lung Association) or you can access the web based program at www.lungusa.org.    [place] Tobacco Users Help Line:  (344) 576-1140       Toll Free: 1-670.790.7527  Quit Tobacco Program [facility] (271)343-0984    Crisis Hotline:    [place] Crisis Hotline:  4-483-NLFGLRR or 1-993.894.7519    [place] Crisis Hotline:    1-304.652.8283 or 042-141-6558    Discharge Survey:   Thank you for choosing [facility]. We hope we did everything we  could to make your hospital stay a pleasant one. You may be receiving a phone survey and we would appreciate your time and participation in answering the questions. Your input is very valuable to us in our efforts to improve our service to our patients and their families.        My signature on this form indicates that:    1. I have reviewed and understand the above information.  2. My questions regarding this information have been answered to my satisfaction.  3. I have formulated a plan with my discharge nurse to obtain my prescribed medications for home.                  Disclaimer         __________________________________                     __________       ________                       Patient Signature                                                 Date                    Time

## 2017-06-12 PROBLEM — Z99.2 DIALYSIS PATIENT (HCC): Status: ACTIVE | Noted: 2017-06-12

## 2017-06-12 PROBLEM — I10 HTN (HYPERTENSION): Status: ACTIVE | Noted: 2017-06-12

## 2017-06-12 PROBLEM — Z99.2 ESRD (END STAGE RENAL DISEASE) ON DIALYSIS (HCC): Status: ACTIVE | Noted: 2017-06-12

## 2017-06-12 PROBLEM — N18.6 ESRD (END STAGE RENAL DISEASE) ON DIALYSIS (HCC): Status: ACTIVE | Noted: 2017-06-12

## 2017-06-12 LAB
ABO GROUP BLD: NORMAL
ABO GROUP BLD: NORMAL
ALBUMIN SERPL BCP-MCNC: 2.8 G/DL (ref 3.2–4.9)
ALBUMIN/GLOB SERPL: 0.9 G/DL
ALP SERPL-CCNC: 127 U/L (ref 30–99)
ALT SERPL-CCNC: 12 U/L (ref 2–50)
ANION GAP SERPL CALC-SCNC: 8 MMOL/L (ref 0–11.9)
APTT PPP: 30.3 SEC (ref 24.7–36)
AST SERPL-CCNC: 22 U/L (ref 12–45)
BASOPHILS # BLD AUTO: 0.5 % (ref 0–1.8)
BASOPHILS # BLD: 0.03 K/UL (ref 0–0.12)
BILIRUB SERPL-MCNC: 0.6 MG/DL (ref 0.1–1.5)
BLD GP AB SCN SERPL QL: NORMAL
BUN SERPL-MCNC: 28 MG/DL (ref 8–22)
CALCIUM SERPL-MCNC: 10.4 MG/DL (ref 8.5–10.5)
CHLORIDE SERPL-SCNC: 101 MMOL/L (ref 96–112)
CO2 SERPL-SCNC: 31 MMOL/L (ref 20–33)
CREAT SERPL-MCNC: 5.08 MG/DL (ref 0.5–1.4)
EKG IMPRESSION: NORMAL
EOSINOPHIL # BLD AUTO: 0.37 K/UL (ref 0–0.51)
EOSINOPHIL NFR BLD: 5.6 % (ref 0–6.9)
ERYTHROCYTE [DISTWIDTH] IN BLOOD BY AUTOMATED COUNT: 60.4 FL (ref 35.9–50)
GFR SERPL CREATININE-BSD FRML MDRD: 8 ML/MIN/1.73 M 2
GLOBULIN SER CALC-MCNC: 3.2 G/DL (ref 1.9–3.5)
GLUCOSE BLD-MCNC: 109 MG/DL (ref 65–99)
GLUCOSE BLD-MCNC: 82 MG/DL (ref 65–99)
GLUCOSE SERPL-MCNC: 97 MG/DL (ref 65–99)
HCT VFR BLD AUTO: 31.1 % (ref 37–47)
HEMOCCULT STL QL: NEGATIVE
HGB BLD-MCNC: 7.4 G/DL (ref 12–16)
HGB BLD-MCNC: 7.7 G/DL (ref 12–16)
HGB BLD-MCNC: 9.3 G/DL (ref 12–16)
HGB BLD-MCNC: 9.6 G/DL (ref 12–16)
IMM GRANULOCYTES # BLD AUTO: 0.01 K/UL (ref 0–0.11)
IMM GRANULOCYTES NFR BLD AUTO: 0.2 % (ref 0–0.9)
INR PPP: 1.19 (ref 0.87–1.13)
LACTATE BLD-SCNC: 1.9 MMOL/L (ref 0.5–2)
LYMPHOCYTES # BLD AUTO: 2.17 K/UL (ref 1–4.8)
LYMPHOCYTES NFR BLD: 32.8 % (ref 22–41)
MCH RBC QN AUTO: 31.5 PG (ref 27–33)
MCHC RBC AUTO-ENTMCNC: 30.9 G/DL (ref 33.6–35)
MCV RBC AUTO: 102 FL (ref 81.4–97.8)
MONOCYTES # BLD AUTO: 0.63 K/UL (ref 0–0.85)
MONOCYTES NFR BLD AUTO: 9.5 % (ref 0–13.4)
NEUTROPHILS # BLD AUTO: 3.41 K/UL (ref 2–7.15)
NEUTROPHILS NFR BLD: 51.4 % (ref 44–72)
NRBC # BLD AUTO: 0 K/UL
NRBC BLD AUTO-RTO: 0 /100 WBC
PLATELET # BLD AUTO: 106 K/UL (ref 164–446)
PMV BLD AUTO: 11.3 FL (ref 9–12.9)
POTASSIUM SERPL-SCNC: 4.7 MMOL/L (ref 3.6–5.5)
PROT SERPL-MCNC: 6 G/DL (ref 6–8.2)
PROTHROMBIN TIME: 15.5 SEC (ref 12–14.6)
RBC # BLD AUTO: 3.05 M/UL (ref 4.2–5.4)
RH BLD: NORMAL
SODIUM SERPL-SCNC: 140 MMOL/L (ref 135–145)
TROPONIN I SERPL-MCNC: 0.07 NG/ML (ref 0–0.04)
TROPONIN I SERPL-MCNC: 0.09 NG/ML (ref 0–0.04)
TROPONIN I SERPL-MCNC: 0.09 NG/ML (ref 0–0.04)
WBC # BLD AUTO: 6.6 K/UL (ref 4.8–10.8)

## 2017-06-12 PROCEDURE — 96367 TX/PROPH/DG ADDL SEQ IV INF: CPT

## 2017-06-12 PROCEDURE — 86850 RBC ANTIBODY SCREEN: CPT

## 2017-06-12 PROCEDURE — G0378 HOSPITAL OBSERVATION PER HR: HCPCS

## 2017-06-12 PROCEDURE — 86901 BLOOD TYPING SEROLOGIC RH(D): CPT

## 2017-06-12 PROCEDURE — A9270 NON-COVERED ITEM OR SERVICE: HCPCS | Performed by: HOSPITALIST

## 2017-06-12 PROCEDURE — 36415 COLL VENOUS BLD VENIPUNCTURE: CPT

## 2017-06-12 PROCEDURE — 700111 HCHG RX REV CODE 636 W/ 250 OVERRIDE (IP): Performed by: HOSPITALIST

## 2017-06-12 PROCEDURE — 96365 THER/PROPH/DIAG IV INF INIT: CPT

## 2017-06-12 PROCEDURE — 770006 HCHG ROOM/CARE - MED/SURG/GYN SEMI*

## 2017-06-12 PROCEDURE — 83605 ASSAY OF LACTIC ACID: CPT

## 2017-06-12 PROCEDURE — 85025 COMPLETE CBC W/AUTO DIFF WBC: CPT

## 2017-06-12 PROCEDURE — 90935 HEMODIALYSIS ONE EVALUATION: CPT

## 2017-06-12 PROCEDURE — 700102 HCHG RX REV CODE 250 W/ 637 OVERRIDE(OP): Performed by: HOSPITALIST

## 2017-06-12 PROCEDURE — 85018 HEMOGLOBIN: CPT | Mod: 91

## 2017-06-12 PROCEDURE — 82962 GLUCOSE BLOOD TEST: CPT

## 2017-06-12 PROCEDURE — 85610 PROTHROMBIN TIME: CPT

## 2017-06-12 PROCEDURE — 85730 THROMBOPLASTIN TIME PARTIAL: CPT

## 2017-06-12 PROCEDURE — 84484 ASSAY OF TROPONIN QUANT: CPT

## 2017-06-12 PROCEDURE — 86900 BLOOD TYPING SEROLOGIC ABO: CPT

## 2017-06-12 PROCEDURE — 700105 HCHG RX REV CODE 258: Performed by: HOSPITALIST

## 2017-06-12 PROCEDURE — 93010 ELECTROCARDIOGRAM REPORT: CPT | Performed by: INTERNAL MEDICINE

## 2017-06-12 PROCEDURE — 99232 SBSQ HOSP IP/OBS MODERATE 35: CPT | Performed by: HOSPITALIST

## 2017-06-12 PROCEDURE — 306588 SLEEVE,VASO CALF MED: Performed by: HOSPITALIST

## 2017-06-12 PROCEDURE — 93005 ELECTROCARDIOGRAM TRACING: CPT | Performed by: HOSPITALIST

## 2017-06-12 PROCEDURE — 80053 COMPREHEN METABOLIC PANEL: CPT

## 2017-06-12 PROCEDURE — 96375 TX/PRO/DX INJ NEW DRUG ADDON: CPT

## 2017-06-12 PROCEDURE — 700101 HCHG RX REV CODE 250: Performed by: HOSPITALIST

## 2017-06-12 PROCEDURE — 5A1D60Z PERFORMANCE OF URINARY FILTRATION, MULTIPLE: ICD-10-PCS | Performed by: INTERNAL MEDICINE

## 2017-06-12 PROCEDURE — 82272 OCCULT BLD FECES 1-3 TESTS: CPT

## 2017-06-12 RX ORDER — HYDRALAZINE HYDROCHLORIDE 20 MG/ML
20 INJECTION INTRAMUSCULAR; INTRAVENOUS EVERY 6 HOURS PRN
Status: DISCONTINUED | OUTPATIENT
Start: 2017-06-12 | End: 2017-06-19

## 2017-06-12 RX ORDER — LIDOCAINE HYDROCHLORIDE 10 MG/ML
1 INJECTION, SOLUTION INFILTRATION; PERINEURAL PRN
Status: DISCONTINUED | OUTPATIENT
Start: 2017-06-12 | End: 2017-06-23 | Stop reason: HOSPADM

## 2017-06-12 RX ADMIN — OMEPRAZOLE 20 MG: 20 CAPSULE, DELAYED RELEASE ORAL at 07:45

## 2017-06-12 RX ADMIN — THIAMINE HYDROCHLORIDE 100 MG: 100 INJECTION, SOLUTION INTRAMUSCULAR; INTRAVENOUS at 12:10

## 2017-06-12 RX ADMIN — SIMVASTATIN 40 MG: 20 TABLET, FILM COATED ORAL at 20:19

## 2017-06-12 RX ADMIN — RENAGEL 800 MG: 800 TABLET ORAL at 07:47

## 2017-06-12 RX ADMIN — RENAGEL 800 MG: 800 TABLET ORAL at 20:20

## 2017-06-12 RX ADMIN — OMEPRAZOLE 20 MG: 20 CAPSULE, DELAYED RELEASE ORAL at 20:19

## 2017-06-12 RX ADMIN — SENNOSIDES AND DOCUSATE SODIUM 2 TABLET: 8.6; 5 TABLET ORAL at 20:19

## 2017-06-12 RX ADMIN — LOSARTAN POTASSIUM 25 MG: 50 TABLET, FILM COATED ORAL at 07:46

## 2017-06-12 RX ADMIN — FOLIC ACID 1 MG: 5 INJECTION, SOLUTION INTRAMUSCULAR; INTRAVENOUS; SUBCUTANEOUS at 10:58

## 2017-06-12 RX ADMIN — SENNOSIDES AND DOCUSATE SODIUM 2 TABLET: 8.6; 5 TABLET ORAL at 07:45

## 2017-06-12 RX ADMIN — SODIUM CHLORIDE 1000 ML: 9 INJECTION, SOLUTION INTRAVENOUS at 02:50

## 2017-06-12 ASSESSMENT — ENCOUNTER SYMPTOMS
BACK PAIN: 0
NAUSEA: 0
BLOOD IN STOOL: 1
HEADACHES: 0
DIZZINESS: 1
SHORTNESS OF BREATH: 0
ABDOMINAL PAIN: 0
VOMITING: 0
MEMORY LOSS: 1

## 2017-06-12 ASSESSMENT — LIFESTYLE VARIABLES
ALCOHOL_USE: NO
EVER_SMOKED: NEVER

## 2017-06-12 ASSESSMENT — PAIN SCALES - GENERAL
PAINLEVEL_OUTOF10: 0

## 2017-06-12 NOTE — ED NOTES
Pt discharged from hospital recently and had blood in her stool x1 today. Denies abdominal pain or bloating or nausea. Pt had one emesis today prior to blood in stool. Had dialysis on Friday normally. Pt here per family request due to blood in stool.

## 2017-06-12 NOTE — PROGRESS NOTES
Pt  admitted to room T201 via transport in  A gurney from Ed at 0200.  Pt aaox4 .Pt weak on her BLE, requiring  2 person assist with FWW from gurney to bed.As soon as pt was placed in bed,Pt became unresponsive  for approximately 2 mins . Unable to arouse pt after sternal rubs. Code blue activated. /105. HR 60's SR.   Dr Thomas maravillad at 0220, received order .

## 2017-06-12 NOTE — PROGRESS NOTES
Nephrology/Hemodialysis note    Patient with ESRD/Hd admitted with GIB  Seen and examined during HD - tolerates well  VS stable  Labs reviewed  Off heparin  Please see dialysis flow sheet for details

## 2017-06-12 NOTE — DISCHARGE PLANNING
Care Transition Team Assessment    Information Source  Orientation : Oriented x 4  Information Given By: Patient  Informant's Name:  (Skye James)  Who is responsible for making decisions for patient? : Patient    Readmission Evaluation  Is this a readmission?: Yes - unplanned readmission  Why do you think you were readmitted?:  (Blood in stool)  Was an appointment arranged for you prior to discharge?: No  Were there new prescriptions you were supposed to fill after you were discharged?: Yes, prescriptions filled  Did you understand your discharge instructions?: Yes  Did you have enough support after your last discharge?: Yes    Elopement Risk  Legal Hold: No  Ambulatory or Self Mobile in Wheelchair: No-Not an Elopement Risk  Elopement Risk: Not at Risk for Elopement    Interdisciplinary Discharge Planning  Does Admitting Nurse Feel This Could be a Complex Discharge?: No  Primary Care Physician:  (Alistair Gandara MD)  Lives with - Patient's Self Care Capacity: Adult Children  Support Systems: Family Member(s)  Housing / Facility: [address]  Do You Take your Prescribed Medications Regularly: Yes  Able to Return to Previous ADL's: Yes  Mobility Issues: Yes  Patient Expects to be Discharged to::  (Home)  Assistance Needed: Unknown at this Time  Durable Medical Equipment: Walker    Discharge Preparedness  What is your plan after discharge?: Home with help  What are your discharge supports?: Child, Sibling  Prior Functional Level: Uses Walker  Difficulity with ADLs: Walking  Difficulity with IADLs: Driving  Difficulity with IADL Comments:  (Doesn't drive.)    Functional Assesment  Prior Functional Level: Uses Walker    Finances  Financial Barriers to Discharge: No  Prescription Coverage: Yes    Vision / Hearing Impairment  Vision Impairment : No  Hearing Impairment : No         Advance Directive  Advance Directive?: None  Advance Directive offered?: AD Booklet refused    Domestic Abuse  Have you ever been the  victim of abuse or violence?: No  Physical Abuse or Sexual Abuse: No  Verbal Abuse or Emotional Abuse: No  Possible Abuse Reported to:: Not Applicable    Psychological Assessment  History of Substance Abuse: None  History of Psychiatric Problems: No  Non-compliant with Treatment: No  Newly Diagnosed Illness: No    Discharge Risks or Barriers  Discharge risks or barriers?: No  Patient risk factors: Multiple ED visits    Anticipated Discharge Information  Anticipated discharge disposition: Home  Discharge Address:  ([address] [place] [address])  Discharge Contact Phone Number:  (Elmer  (son)  [phone])

## 2017-06-12 NOTE — ASSESSMENT & PLAN NOTE
- q8hr and transfuse prn Hgb< 7.  - iron studies, Pharmacy replacement per kinetics  - chronically low Hgb, GI bleed not highly suspected at this time, will replace iron and continue to monitor

## 2017-06-12 NOTE — H&P
DATE OF SERVICE:  06/11/2017      PRIMARY MEDICAL PHYSICIAN:  Alistair Gandara MD      CHIEF COMPLAINT:  Bloody stools.      HISTORY OF PRESENT ILLNESS:  This is an 82-year-old female with multiple   medical issues including end-stage renal disease, on hemodialysis;   hypertension; previous history of multiple DVTs, on chronic anticoagulation;   hypothyroidism, and pulmonary arterial hypertension who comes in with   complaints of bloody stools.  The patient is a rather poor historian.  She is   able to answer most questions appropriately, but she is very tangential and   forgetful.  She states that she has had blood in her stools for the last day.    She is having difficult time delineating between brown stools covered with   blood versus black tarry sticky stools.  She does state that they are rather   dark.  She denies any bright red blood per rectum.  She has been constipated   recently and apparently drank some Ensure which caused liquid stools.    Otherwise, she denies any hemetemesis or hemoptysis.  She is unsure if she has   ever had a colonoscopy or endoscopy.  She does not know if she has been on   any recent NSAIDs.  She reports that there have been no changes in her diet.    She denies any travel, any picnics, camping or hiking.  She denies any sick   contacts.  She denies any dysuria.  She also reports that she has no history   of hemorrhoids.      REVIEW OF SYSTEMS:  A full review of systems was completed and all pertinent   positives and negatives are included in the HPI above.      PAST MEDICAL HISTORY:    1.  Hypertension.    2.  End-stage renal disease, on hemodialysis.    3.  History of recurrent DVTs, on anticoagulation.    4.  Hypothyroidism.    5.  PAH.    7.  Mitral regurg.      PAST SURGICAL HISTORY:    1.  Tonsillectomy.    2.  Hysterectomy.    3.  AV fistula creation.    4.  Cataract surgery.      SOCIAL HISTORY:  No tobacco, alcohol, or illicit drugs.  The patient lives   with her children.       FAMILY HISTORY:  Mother with hypertension.      ALLERGIES:  No known drug allergies.      HOME MEDICATIONS:    1.  Cozaar 25 mg p.o. daily.    2.  Sensipar 90 mg p.o. daily.    3.  Metoprolol 25 mg p.o. daily.    4.  Sevelamer 800 mg p.o. b.i.d.    5.  Zocor 40 mg p.o. daily.      PHYSICAL EXAMINATION:    VITAL SIGNS:  Temperature 97.1, heart rate 76, respirations 16 and blood   pressure 161/66.  Patient is saturating at 96% on 3 L of oxygen.    GENERAL:  This is an elderly and frail  female who is in no   acute distress.    HEENT:  Normocephalic, atraumatic.  EOMI, moist mucous membranes.    NECK:  Supple, there is no JVD.  There is no supraclavicular adenopathy.    CARDIOVASCULAR:  Positive S1, S2, regular rate and rhythm.  No murmurs, rubs,   or gallops appreciated, but her heart sounds are quite distant.    PULMONARY:  Clear to auscultation bilaterally, diminished at the bases.  No   wheezes, rubs, or rhonchi heard.    GASTROINTESTINAL:  Soft, nontender, nondistended.  Positive bowel sounds.  No   hepatosplenomegaly.    EXTREMITIES:  Warm, well-perfused:  No clubbing, cyanosis, or edema.    Capillary refill less than 2 seconds.  Distal pulses are intact.      NEUROLOGIC:  A and O x3.  Cranial nerves II-XII grossly intact.      LABORATORY DATA:  WBC 4.7, hemoglobin 8.8, hematocrit 28.7 and platelet 116.    Sodium 138, potassium 4.2, chloride 99, CO2 is 34, glucose 90, BUN 30 and   creatinine 5.26.  GFR is 9.  Troponin is 0.06.      INR is 1.24.      ASSESSMENT AND PLAN:  This is an 82-year-old female with multiple medical   problems as noted above, who comes in with complaints of bloody stools.    1.  Hematochezia:  The patient is a very poor historian and at this point, I   am unsure if she had blood covered stools or dark stools.  I will check an   occult stool.  She denies any history of hemorrhoids.  She will be monitored   closely on telemetry floor and while we trend her hemoglobin  levels.  Should   they drop below 7, then she has agreed to a transfusion.  I will keep her on a   liquid diet for now and add an oral PPI.  As her hemoglobin levels are not   significantly dropped, I am holding off on a GI consultation.  If her   hemoglobin remains stable, then she can likely go home for an outpatient GI   followup tomorrow.  She will be receiving gentle IV fluids.    2.  End-stage renal disease, on hemodialysis:  The patient has not missed any   dialysis sessions.  Her current function is better than her previous baseline.    We will continue to monitor and plan to consult nephrology in the morning   for inpatient dialysis as needed.    3.  Hyperlipidemia.  Continue home Zocor.    4.  Hypertension.  Continue home metoprolol and Cozaar.    5.  History of deep venous thrombosis, on anticoagulation:  I am holding the   patient's Coumadin for now, once there is no further evidence of   gastrointestinal bleed, then we will resume this medication.    6.  Thrombocytopenia:  This appears to be chronic, I am holding off on   transfusions for now; however, if her hemoglobin begins to drop or she has any   active bleeding noted, then we will be transfusing platelets in addition to   packed red blood cells.      DISPOSITION:  Telemetry.      PROPHYLAXIS:  Sequential compression devices for DVT prophylaxis, in a patient   with suspected GI bleed, PPI has been ordered, stool softeners ordered.      CODE:  Full.       ____________________________________     MD REBECA FRY / LASHAY    DD:  06/12/2017 01:54:24  DT:  06/12/2017 02:34:11    D#:  [id number]  Job#:  [id number]

## 2017-06-12 NOTE — PROGRESS NOTES
Bedside report received 0700. Pt easily aroused, A&Ox1, Disoriented to time, place and event. Pt reorient, will continue to monitor, VANESSA Meija updated on neuro changes;  Pt denies SOB, and pain at this time; POC discussed with pt; Pt verbalizes concern of her security while she is in the hospital. Pt has no personal belongings with her. Pt informed of security measures used while in the hospital; Verbalized understanding; all questions answered at this time.

## 2017-06-12 NOTE — ED NOTES
Pt resting comfortable and family at the bedside. All lab work back and waiting for EDP to re-evaluate.

## 2017-06-12 NOTE — PROGRESS NOTES
Pt came up to the floor via [employer]. Helped the pt up x2 with a walker, pt stated her legs are getting tired, pulled the bed towards her and laid her down on the bed, soon as we scoot her up she passed out. Called code blue.

## 2017-06-12 NOTE — ED NOTES
"Med rec updated and complete.  Allergies reviewed.  Pt stated that she just got out of [facility]   On Friday.  Pt stated that she would not take most of the medications  At [facility] because \"that place did not know what they were  Doing and she will never go back\".  Pt stated that she adamantly refused to ever take coumadin from  Them.   "

## 2017-06-12 NOTE — DISCHARGE PLANNING
Pt has OP hemodialysis at [facility].  Clinic has been notified of hospitalization.

## 2017-06-12 NOTE — DISCHARGE PLANNING
Medical Social Work    Referral: Code Blue    Intervention: MSW responded to Kevin Levine in T201- for Syke James (: 1935).  By the time MSW arrived pt was responsive.    Plan: SW will remain available as needed.

## 2017-06-12 NOTE — ED PROVIDER NOTES
"ED Provider Note    Scribed for Dr. Ben Davis D.O. by Raquel Hunt. 6/11/2017  8:12 PM    Primary care provider: Alistair Gandara M.D.  Means of arrival: EMS  History obtained from: Patient  History limited by: None    CHIEF COMPLAINT  Chief Complaint   Patient presents with   • Bloody Stools     HPI  Skye James is a 82 y.o. female who presents to the Emergency Department brought in by EMS due to hematochezia onset today. Per nurse's notes, the patient was discharged from the hospital recently and has had blood in her stool for the past day. Per patient, her granddaughters noticed the blood and called an ambulance for her. She has had associated symptoms of vomiting today but denies hematemesis. The patient does not report any aggravating or alleviating factors. She also states that she was constipated and drank Ensure. Afterwards, she had \"brown\" diarrhea. Denies abdominal pain, nausea, chest pain, or shortness of breath. The patient denies the use of anti-coagulants recently. She is a dialysis patient and last went for dialysis on Friday. She goes to dialysis Monday, Wednesday, and Friday.     REVIEW OF SYSTEMS  Review of Systems   Respiratory: Negative for shortness of breath.    Cardiovascular: Negative for chest pain.   Gastrointestinal: Positive for vomiting, diarrhea, constipation (resolved) and blood in stool. Negative for nausea and abdominal pain.        Negative for hematemesis.    All other systems reviewed and are negative.  C    PAST MEDICAL HISTORY   has a past medical history of GOUT; other; Hypertension; CKD (chronic kidney disease); Renal disorder; Arrhythmia; Heart murmur; MEDICAL HOME (4/23/2013); Arthritis; DVT (deep venous thrombosis) (CMS-HCC); CATARACT; Preoperative clearance (7/15/2013); Unspecified hemorrhagic conditions; Dialysis; Pneumonia (5/24/2014); Other specified disorder of intestines; ASTHMA; Asthma; Anemia; Dental disorder; Falls; Anginal syndrome (CMS-HCC); " Glaucoma; Fall; Indigestion; Disorder of thyroid; Chronic anticoagulation; Moderate mitral regurgitation; and PAH (pulmonary artery hypertension) - liekly due to mitral regurgitation but with history of thromboembolic disease.    SURGICAL HISTORY   has past surgical history that includes tonsillectomy; hysterectomy laparoscopy; av fistula creation (6/22/2010); recovery (4/3/2012); cataract phaco with iol (6/20/2012); recovery (11/27/2012); recovery (7/16/2013); and recovery (8/5/2014).    SOCIAL HISTORY  Social History   Substance Use Topics   • Smoking status: Never Smoker    • Smokeless tobacco: Never Used   • Alcohol Use: No      History   Drug Use No       FAMILY HISTORY  Family History   Problem Relation Age of Onset   • Hypertension Mother        CURRENT MEDICATIONS  No current facility-administered medications on file prior to encounter.     Current Outpatient Prescriptions on File Prior to Encounter   Medication Sig Dispense Refill   • senna-docusate (PERICOLACE OR SENOKOT S) 8.6-50 MG Tab Take 2 Tabs by mouth 2 Times a Day. 30 Tab 0   • acetaminophen (TYLENOL) 325 MG Tab Take 2 Tabs by mouth every 6 hours as needed (Mild Pain; (Pain scale 1-3); Temp greater than 100.5 F). 30 Tab 0   • ondansetron (ZOFRAN ODT) 4 MG TABLET DISPERSIBLE Take 1 Tab by mouth every four hours as needed for Nausea/Vomiting (give PO if IV route is unavailable. May give per feeding tube.). 10 Tab 0   • guaifenesin DM (ROBITUSSIN DM) 100-10 MG/5ML Syrup syrup Take 10 mL by mouth every 6 hours as needed for Cough. 560 mL    • sevelamer (RENAGEL) 800 MG Tab Take 1 Tab by mouth 2 times a day, with meals. 90 Tab 3   • benzocaine-menthol (CEPACOL) 15-3.6 MG Lozenge Spray 1 Lozenge in mouth/throat every 2 hours as needed.     • predniSONE (DELTASONE) 20 MG Tab Take 20mg for 3 days then 10mg for 3 days then stop 30 Tab 0   • warfarin (COUMADIN) 2 MG Tab Take 1 Tab by mouth COUMADIN-DAILY. 30 Tab 3   • losartan (COZAAR) 25 MG Tab Take 25 mg  "by mouth every day.     • SENSIPAR 90 MG Tab Take 1 Tab by mouth every day.     • metoprolol SR (TOPROL XL) 25 MG TABLET SR 24 HR Take 25 mg by mouth every day.     • Sevelamer Carbonate 800 MG Tab Take 1 Tab by mouth 2 Times a Day.     • simvastatin (ZOCOR) 40 MG Tab Take 40 mg by mouth every evening.       ALLERGIES  Allergies   Allergen Reactions   • Nkda [No Known Drug Allergy]        PHYSICAL EXAM  VITAL SIGNS: /66 mmHg  Pulse 76  Temp(Src) 36.2 °C (97.1 °F)  Resp 16  Ht 1.575 m (5' 2\")  Wt 59 kg (130 lb 1.1 oz)  BMI 23.78 kg/m2  SpO2 96%  LMP 05/12/1970    Constitutional: Well developed, well nourished. No acute distress.  HEENT: Normocephalic, atraumatic. Posterior pharynx clear and moist.  Eyes:  EOMI. Normal sclera.  Neck: Supple, Full range of motion, nontender.  Chest/Pulmonary: clear to ausculation. Symmetrical expansion.   Cardio: Regular rate and rhythm. Right upper extremity has a palpable thrill.   Abdomen: Soft, nontender. No peritoneal signs. No guarding. No palpable masses.  Back: No CVA tenderness, nontender midline, no step offs.  Musculoskeletal: No deformity, no edema, neurovascular intact.   Neuro: Clear speech, appropriate, cooperative, cranial nerves II-XII grossly intact.  Psych: Normal mood and affect    LABS  Results for orders placed or performed during the hospital encounter of 06/11/17   CBC WITH DIFFERENTIAL   Result Value Ref Range    WBC 4.7 (L) 4.8 - 10.8 K/uL    RBC 2.80 (L) 4.20 - 5.40 M/uL    Hemoglobin 8.8 (L) 12.0 - 16.0 g/dL    Hematocrit 28.7 (L) 37.0 - 47.0 %    .5 (H) 81.4 - 97.8 fL    MCH 31.4 27.0 - 33.0 pg    MCHC 30.7 (L) 33.6 - 35.0 g/dL    RDW 59.1 (H) 35.9 - 50.0 fL    Platelet Count 116 (L) 164 - 446 K/uL    MPV 11.1 9.0 - 12.9 fL    Neutrophils-Polys 62.90 44.00 - 72.00 %    Lymphocytes 17.30 (L) 22.00 - 41.00 %    Monocytes 12.40 0.00 - 13.40 %    Eosinophils 6.60 0.00 - 6.90 %    Basophils 0.60 0.00 - 1.80 %    Immature Granulocytes 0.20 " 0.00 - 0.90 %    Nucleated RBC 0.00 /100 WBC    Neutrophils (Absolute) 2.95 2.00 - 7.15 K/uL    Lymphs (Absolute) 0.81 (L) 1.00 - 4.80 K/uL    Monos (Absolute) 0.58 0.00 - 0.85 K/uL    Eos (Absolute) 0.31 0.00 - 0.51 K/uL    Baso (Absolute) 0.03 0.00 - 0.12 K/uL    Immature Granulocytes (abs) 0.01 0.00 - 0.11 K/uL    NRBC (Absolute) 0.00 K/uL   PROTHROMBIN TIME   Result Value Ref Range    PT 16.0 (H) 12.0 - 14.6 sec    INR 1.24 (H) 0.87 - 1.13   APTT   Result Value Ref Range    APTT 32.0 24.7 - 36.0 sec   COMP METABOLIC PANEL   Result Value Ref Range    Sodium 138 135 - 145 mmol/L    Potassium 4.2 3.6 - 5.5 mmol/L    Chloride 99 96 - 112 mmol/L    Co2 34 (H) 20 - 33 mmol/L    Anion Gap 5.0 0.0 - 11.9    Glucose 90 65 - 99 mg/dL    Bun 30 (H) 8 - 22 mg/dL    Creatinine 5.26 (HH) 0.50 - 1.40 mg/dL    Calcium 10.7 (H) 8.5 - 10.5 mg/dL    AST(SGOT) 24 12 - 45 U/L    ALT(SGPT) 12 2 - 50 U/L    Alkaline Phosphatase 135 (H) 30 - 99 U/L    Total Bilirubin 0.6 0.1 - 1.5 mg/dL    Albumin 3.0 (L) 3.2 - 4.9 g/dL    Total Protein 6.1 6.0 - 8.2 g/dL    Globulin 3.1 1.9 - 3.5 g/dL    A-G Ratio 1.0 g/dL   TROPONIN   Result Value Ref Range    Troponin I 0.06 (H) 0.00 - 0.04 ng/mL   ESTIMATED GFR   Result Value Ref Range    GFR If  9 (A) >60 mL/min/1.73 m 2    GFR If Non  8 (A) >60 mL/min/1.73 m 2   COD (Adult)   Result Value Ref Range    ABO Grouping Only O     Rh Grouping Only POS     Antibody Screen-Cod NEG    HGB (Hemoglobin) for 48 hours   Result Value Ref Range    Hemoglobin 8.6 (L) 12.0 - 16.0 g/dL   ABO CONFIRMATION   Result Value Ref Range    Second ABO Typing With Cod O    CBC WITH DIFFERENTIAL   Result Value Ref Range    WBC 6.6 4.8 - 10.8 K/uL    RBC 3.05 (L) 4.20 - 5.40 M/uL    Hemoglobin 9.6 (L) 12.0 - 16.0 g/dL    Hematocrit 31.1 (L) 37.0 - 47.0 %    .0 (H) 81.4 - 97.8 fL    MCH 31.5 27.0 - 33.0 pg    MCHC 30.9 (L) 33.6 - 35.0 g/dL    RDW 60.4 (H) 35.9 - 50.0 fL    Platelet Count  106 (L) 164 - 446 K/uL    MPV 11.3 9.0 - 12.9 fL    Neutrophils-Polys 51.40 44.00 - 72.00 %    Lymphocytes 32.80 22.00 - 41.00 %    Monocytes 9.50 0.00 - 13.40 %    Eosinophils 5.60 0.00 - 6.90 %    Basophils 0.50 0.00 - 1.80 %    Immature Granulocytes 0.20 0.00 - 0.90 %    Nucleated RBC 0.00 /100 WBC    Neutrophils (Absolute) 3.41 2.00 - 7.15 K/uL    Lymphs (Absolute) 2.17 1.00 - 4.80 K/uL    Monos (Absolute) 0.63 0.00 - 0.85 K/uL    Eos (Absolute) 0.37 0.00 - 0.51 K/uL    Baso (Absolute) 0.03 0.00 - 0.12 K/uL    Immature Granulocytes (abs) 0.01 0.00 - 0.11 K/uL    NRBC (Absolute) 0.00 K/uL   COMP METABOLIC PANEL   Result Value Ref Range    Sodium 140 135 - 145 mmol/L    Potassium 4.7 3.6 - 5.5 mmol/L    Chloride 101 96 - 112 mmol/L    Co2 31 20 - 33 mmol/L    Anion Gap 8.0 0.0 - 11.9    Glucose 97 65 - 99 mg/dL    Bun 28 (H) 8 - 22 mg/dL    Creatinine 5.08 (H) 0.50 - 1.40 mg/dL    Calcium 10.4 8.5 - 10.5 mg/dL    AST(SGOT) 22 12 - 45 U/L    ALT(SGPT) 12 2 - 50 U/L    Alkaline Phosphatase 127 (H) 30 - 99 U/L    Total Bilirubin 0.6 0.1 - 1.5 mg/dL    Albumin 2.8 (L) 3.2 - 4.9 g/dL    Total Protein 6.0 6.0 - 8.2 g/dL    Globulin 3.2 1.9 - 3.5 g/dL    A-G Ratio 0.9 g/dL   PROTHROMBIN TIME   Result Value Ref Range    PT 15.5 (H) 12.0 - 14.6 sec    INR 1.19 (H) 0.87 - 1.13   APTT   Result Value Ref Range    APTT 30.3 24.7 - 36.0 sec   LACTIC ACID   Result Value Ref Range    Lactic Acid 1.9 0.5 - 2.0 mmol/L   TROPONIN   Result Value Ref Range    Troponin I 0.07 (H) 0.00 - 0.04 ng/mL   ESTIMATED GFR   Result Value Ref Range    GFR If African American 10 (A) >60 mL/min/1.73 m 2    GFR If Non  8 (A) >60 mL/min/1.73 m 2   ACCU-CHEK GLUCOSE   Result Value Ref Range    Glucose - Accu-Ck 109 (H) 65 - 99 mg/dL   EKG   Result Value Ref Range    Report       [facility]    Test Date:  2017-06-12  Pt Name:    FRANCISCO MARK              Department: CPU  MRN:        [id number]                      Room:        "T201  Gender:     F                            Technician: CRYSTAL  :        [id number]                   Requested By:RUBIN ABAD  Order #:    [id number]                    Reading MD:    Measurements  Intervals                                Axis  Rate:       67                           P:          44  DC:         180                          QRS:        -4  QRSD:       88                           T:          -12  QT:         392  QTc:        414    Interpretive Statements  SINUS RHYTHM  NONSPECIFIC T ABNORMALITIES, INFERIOR LEADS  Compared to ECG 2017 03:35:40  Possible ischemia no longer present  T-wave abnormality still present       All labs reviewed by me.    COURSE & MEDICAL DECISION MAKING  Pertinent Labs & Imaging studies reviewed. (See chart for details)    8:12 PM - Patient seen and examined at bedside. Patient will be treated with NS infusion 1,000 mL IV for rehydration and zofran 4 mg IV. Ordered prothrombin time, troponin, CMP, APTT, and CBC with differential to evaluate her symptoms. The differential diagnoses include but are not limited to: hemorrhoids and rectal bleeding.     9:10 PM- Reviewed labs, which shows a low hemoglobin and hematocrit.     9:59 PM Recheck: Patient re-evaluated at beside. Patient appears hydrated. Per patient's daughter, the patient's granddaughter was helping her and said that her \"behind was bloody. I explained that her hemoglobin and hematocrit are low and we will need to admit her for further evaluation. Patient understands and agrees.     10:17 PM-  I discussed the patient's case and the above findings with Dr. Abad (Hospitalist) who will see the patient.     DISPOSITION:  Patient will be admitted to Dr. Abad (Hospitalist) in guarded condition.    FINAL IMPRESSION  1. Anemia in other chronic diseases classified elsewhere    2. Rectal bleed          I, Raquel Hunt (Scribe), am scribing for, and in the presence of, Ben Davis D.O..    Electronically " signed by: Raquel Hunt (Scribe), 6/11/2017    I, Ben Davis D.O. personally performed the services described in this documentation, as scribed by Raquel Hunt in my presence, and it is both accurate and complete.    The note accurately reflects work and decisions made by me.  Ben Davis  6/12/2017  4:13 AM

## 2017-06-12 NOTE — PROGRESS NOTES
Hospital Medicine Interval Note  Date of Service:  6/12/2017    Chief Complaint  82 y.o.-year-old female admitted 6/11/2017 with hematochezia.    Interval Problem Update  Hgb 7.7, decreased from 9.6, q8hr initiated, will transfuse prn < 7.  No further stools since admission but monitoring.  Dialysis contacted, appreciate their input.  Made inpatient to continue treatments and monitoring > 2 midnights.    Consultants/Specialty  Nephrology  Possible GI if Hgb continues to decrease, hematochezia resumes    Disposition  Transfer to inpatient status for continued evaluation and treatments. Transfuse prn, may need GI consult if hematochezia or Hgb decrease.       Review of Systems   Unable to perform ROS: other   Constitutional:        Tangential, poor historian. Denies pain or discomfort.   Respiratory: Negative for shortness of breath.    Cardiovascular: Negative for chest pain.   Gastrointestinal: Positive for blood in stool (prior to arrival, none since admission). Negative for nausea, vomiting and abdominal pain.   Musculoskeletal: Negative for back pain.   Neurological: Positive for dizziness (unable to elaborate). Negative for headaches.   Psychiatric/Behavioral: Positive for memory loss.      Physical Exam Laboratory/Imaging   Filed Vitals:    06/12/17 0214 06/12/17 0250 06/12/17 0300 06/12/17 0733   BP: 200/78 124/59 130/60 137/66   Pulse: 60 55 54 52   Temp: 36.3 °C (97.3 °F)  36 °C (96.8 °F) 36.3 °C (97.3 °F)   Resp: 20  20 18   Height:       Weight: 53.9 kg (118 lb 13.3 oz)      SpO2: 100%  96% 100%     Physical Exam   Constitutional: She is oriented to person, place, and time. She appears well-developed and well-nourished. No distress.   HENT:   Head: Normocephalic and atraumatic.   Mouth/Throat: Oropharynx is clear and moist.   Eyes: Conjunctivae and EOM are normal. No scleral icterus.   Neck: Normal range of motion. Neck supple. No JVD present.   Cardiovascular: Regular rhythm.    Murmur (Grade III/VI  systolic murmur) heard.  Bradycardia   Pulmonary/Chest: Effort normal and breath sounds normal. No respiratory distress. She exhibits no tenderness.   Abdominal: Soft. Bowel sounds are normal. She exhibits no distension. There is no tenderness. There is no guarding.   Musculoskeletal: Normal range of motion. She exhibits no edema or tenderness.   Neurological: She is alert and oriented to person, place, and time.   Skin: Skin is warm and dry.   Psychiatric: She has a normal mood and affect. Her behavior is normal.   Tangential, challenging historian but good eye contact, cooperative w/staff.   Nursing note and vitals reviewed.   Lab Results   Component Value Date/Time    WBC 6.6 06/12/2017 02:07 AM    HEMOGLOBIN 7.7* 06/12/2017 08:43 AM    HEMATOCRIT 31.1* 06/12/2017 02:07 AM    PLATELET COUNT 106* 06/12/2017 02:07 AM     Lab Results   Component Value Date/Time    SODIUM 140 06/12/2017 02:07 AM    POTASSIUM 4.7 06/12/2017 02:07 AM    CHLORIDE 101 06/12/2017 02:07 AM    CO2 31 06/12/2017 02:07 AM    GLUCOSE 97 06/12/2017 02:07 AM    BUN 28* 06/12/2017 02:07 AM    CREATININE 5.08* 06/12/2017 02:07 AM      Assessment/Plan    Hematochezia (present on admission)  Assessment & Plan  Stable, no further episodes since admission.  Continue q8h Hgb and transfuse prn Hgb <7, currently 7.7  VS stable, continue monitoring closely    ESRD (end stage renal disease) on dialysis (CMS-[facility]) (present on admission)  Assessment & Plan  Consulted Nephrology to continue dialysis while inpatient.  Avoid nephrotoxins, renal dosing.  Appreciate their recommendations.    Anemia of chronic disease (present on admission)  Assessment & Plan  Hgb decreasing, currently 7.7, monitor q8hr and transfuse prn Hgb< 7.  VS stable, monitor. Hold anticoagulants at this time.    Essential hypertension (present on admission)  Assessment & Plan  Moderately well controlled, continue Cozaar, Metoprolol, Hydralazine prn.    Chronic anticoagulation (present on  admission)  Assessment & Plan  Secondary to hx of DVTs, HOLDING DUE TO REPORT OF BLOOD IN STOOL.       Labs reviewed and Medications reviewed  Morataya catheter: No Morataya      DVT Prophylaxis: Contraindicated - High bleeding risk    Ulcer prophylaxis: Yes    Assessed for rehab: Patient returned to prior level of function, rehabilitation not indicated at this time

## 2017-06-12 NOTE — PROGRESS NOTES
Pt drowsy but easily arousable ,no c/o at this time. /60. HR 54, SB on the monitor.will continue to monitor.

## 2017-06-13 ENCOUNTER — APPOINTMENT (OUTPATIENT)
Dept: RADIOLOGY | Facility: MEDICAL CENTER | Age: 82
DRG: 377 | End: 2017-06-13
Attending: NURSE PRACTITIONER
Payer: MEDICARE

## 2017-06-13 PROBLEM — M25.562 LEFT KNEE PAIN: Chronic | Status: ACTIVE | Noted: 2017-06-13

## 2017-06-13 PROBLEM — R55 SYNCOPE, NEAR: Status: ACTIVE | Noted: 2017-06-13

## 2017-06-13 LAB
ANION GAP SERPL CALC-SCNC: 3 MMOL/L (ref 0–11.9)
BUN SERPL-MCNC: 18 MG/DL (ref 8–22)
CALCIUM SERPL-MCNC: 9.4 MG/DL (ref 8.5–10.5)
CHLORIDE SERPL-SCNC: 102 MMOL/L (ref 96–112)
CO2 SERPL-SCNC: 31 MMOL/L (ref 20–33)
CREAT SERPL-MCNC: 3.72 MG/DL (ref 0.5–1.4)
ERYTHROCYTE [DISTWIDTH] IN BLOOD BY AUTOMATED COUNT: 61.7 FL (ref 35.9–50)
GFR SERPL CREATININE-BSD FRML MDRD: 12 ML/MIN/1.73 M 2
GLUCOSE SERPL-MCNC: 83 MG/DL (ref 65–99)
HCT VFR BLD AUTO: 25.9 % (ref 37–47)
HGB BLD-MCNC: 7.9 G/DL (ref 12–16)
HGB BLD-MCNC: 7.9 G/DL (ref 12–16)
HGB BLD-MCNC: 8.4 G/DL (ref 12–16)
IRON SATN MFR SERPL: 12 % (ref 15–55)
IRON SERPL-MCNC: 27 UG/DL (ref 40–170)
MCH RBC QN AUTO: 31.6 PG (ref 27–33)
MCHC RBC AUTO-ENTMCNC: 30.5 G/DL (ref 33.6–35)
MCV RBC AUTO: 103.6 FL (ref 81.4–97.8)
PLATELET # BLD AUTO: 108 K/UL (ref 164–446)
PMV BLD AUTO: 11.9 FL (ref 9–12.9)
POTASSIUM SERPL-SCNC: 4.2 MMOL/L (ref 3.6–5.5)
RBC # BLD AUTO: 2.5 M/UL (ref 4.2–5.4)
SODIUM SERPL-SCNC: 136 MMOL/L (ref 135–145)
TIBC SERPL-MCNC: 217 UG/DL (ref 250–450)
WBC # BLD AUTO: 5 K/UL (ref 4.8–10.8)

## 2017-06-13 PROCEDURE — 700101 HCHG RX REV CODE 250: Performed by: HOSPITALIST

## 2017-06-13 PROCEDURE — 700105 HCHG RX REV CODE 258: Performed by: HOSPITALIST

## 2017-06-13 PROCEDURE — 700111 HCHG RX REV CODE 636 W/ 250 OVERRIDE (IP): Performed by: NURSE PRACTITIONER

## 2017-06-13 PROCEDURE — A9270 NON-COVERED ITEM OR SERVICE: HCPCS | Performed by: HOSPITALIST

## 2017-06-13 PROCEDURE — 96367 TX/PROPH/DG ADDL SEQ IV INF: CPT

## 2017-06-13 PROCEDURE — 36415 COLL VENOUS BLD VENIPUNCTURE: CPT

## 2017-06-13 PROCEDURE — 85027 COMPLETE CBC AUTOMATED: CPT

## 2017-06-13 PROCEDURE — 700105 HCHG RX REV CODE 258: Performed by: NURSE PRACTITIONER

## 2017-06-13 PROCEDURE — A9270 NON-COVERED ITEM OR SERVICE: HCPCS | Performed by: NURSE PRACTITIONER

## 2017-06-13 PROCEDURE — 700102 HCHG RX REV CODE 250 W/ 637 OVERRIDE(OP): Performed by: NURSE PRACTITIONER

## 2017-06-13 PROCEDURE — 306396 CUSHION, WAFFLE ADULT 17X17: Performed by: HOSPITALIST

## 2017-06-13 PROCEDURE — 770006 HCHG ROOM/CARE - MED/SURG/GYN SEMI*

## 2017-06-13 PROCEDURE — 700102 HCHG RX REV CODE 250 W/ 637 OVERRIDE(OP): Performed by: HOSPITALIST

## 2017-06-13 PROCEDURE — 83540 ASSAY OF IRON: CPT

## 2017-06-13 PROCEDURE — 700111 HCHG RX REV CODE 636 W/ 250 OVERRIDE (IP): Performed by: HOSPITALIST

## 2017-06-13 PROCEDURE — 83550 IRON BINDING TEST: CPT

## 2017-06-13 PROCEDURE — 80048 BASIC METABOLIC PNL TOTAL CA: CPT

## 2017-06-13 PROCEDURE — 85018 HEMOGLOBIN: CPT

## 2017-06-13 PROCEDURE — 99233 SBSQ HOSP IP/OBS HIGH 50: CPT | Performed by: HOSPITALIST

## 2017-06-13 PROCEDURE — 73560 X-RAY EXAM OF KNEE 1 OR 2: CPT | Mod: LT

## 2017-06-13 RX ORDER — MIDODRINE HYDROCHLORIDE 5 MG/1
2.5 TABLET ORAL 2 TIMES DAILY
Status: DISCONTINUED | OUTPATIENT
Start: 2017-06-13 | End: 2017-06-19

## 2017-06-13 RX ADMIN — RENAGEL 800 MG: 800 TABLET ORAL at 20:16

## 2017-06-13 RX ADMIN — MIDODRINE HYDROCHLORIDE 2.5 MG: 5 TABLET ORAL at 20:16

## 2017-06-13 RX ADMIN — SIMVASTATIN 40 MG: 20 TABLET, FILM COATED ORAL at 20:16

## 2017-06-13 RX ADMIN — OMEPRAZOLE 20 MG: 20 CAPSULE, DELAYED RELEASE ORAL at 09:31

## 2017-06-13 RX ADMIN — FOLIC ACID 1 MG: 5 INJECTION, SOLUTION INTRAMUSCULAR; INTRAVENOUS; SUBCUTANEOUS at 09:35

## 2017-06-13 RX ADMIN — SENNOSIDES AND DOCUSATE SODIUM 2 TABLET: 8.6; 5 TABLET ORAL at 20:14

## 2017-06-13 RX ADMIN — THIAMINE HYDROCHLORIDE 100 MG: 100 INJECTION, SOLUTION INTRAMUSCULAR; INTRAVENOUS at 11:04

## 2017-06-13 RX ADMIN — LOSARTAN POTASSIUM 25 MG: 50 TABLET, FILM COATED ORAL at 09:31

## 2017-06-13 RX ADMIN — RENAGEL 800 MG: 800 TABLET ORAL at 09:31

## 2017-06-13 RX ADMIN — MIDODRINE HYDROCHLORIDE 2.5 MG: 5 TABLET ORAL at 11:52

## 2017-06-13 RX ADMIN — IRON SUCROSE 200 MG: 20 INJECTION, SOLUTION INTRAVENOUS at 15:33

## 2017-06-13 RX ADMIN — OMEPRAZOLE 20 MG: 20 CAPSULE, DELAYED RELEASE ORAL at 20:14

## 2017-06-13 ASSESSMENT — ENCOUNTER SYMPTOMS
BACK PAIN: 1
HEADACHES: 0
BLOOD IN STOOL: 0
DOUBLE VISION: 0
DIZZINESS: 0
NECK PAIN: 0
NAUSEA: 0
COUGH: 0
WEAKNESS: 1
DEPRESSION: 0
VOMITING: 0
HEMOPTYSIS: 0
WHEEZING: 0
WEIGHT LOSS: 0
CHILLS: 0
ABDOMINAL PAIN: 0
FEVER: 0
FOCAL WEAKNESS: 0
BRUISES/BLEEDS EASILY: 0
PALPITATIONS: 0
ORTHOPNEA: 0
SHORTNESS OF BREATH: 0
MYALGIAS: 0
SENSORY CHANGE: 0
HEARTBURN: 0
EYES NEGATIVE: 1
BLURRED VISION: 0

## 2017-06-13 ASSESSMENT — PAIN SCALES - GENERAL
PAINLEVEL_OUTOF10: 2
PAINLEVEL_OUTOF10: 2
PAINLEVEL_OUTOF10: 1
PAINLEVEL_OUTOF10: 3

## 2017-06-13 NOTE — PROGRESS NOTES
Pt aaox2-3, forgetful at times. Denies any pain. 98% on 02 2l nc.Pt eating at this time, marlen Po's well. SB-SR on the monitor HR 50-60's. No other c/o at this time.

## 2017-06-13 NOTE — ASSESSMENT & PLAN NOTE
Positive orthostatics but resolved with Midodrine but now hypertensive.  DC Midodrine and follow.

## 2017-06-13 NOTE — CONSULTS
DATE OF SERVICE:  06/12/2017    DATE OF SERVICE:  06/12/2017    REQUESTING PHYSICIAN:  Edy Stratton MD.    REASON FOR CONSULTATION:  To evaluate and provide dialysis for a patient with   end-stage renal disease.    HISTORY OF PRESENT ILLNESS:  The patient is a very pleasant 82-year-old female   with end-stage renal disease, on hemodialysis, who has been receiving   dialysis treatments on Monday, Wednesday, and Friday, admitted with rectal   bleeding.  Over past 2 hours she experienced syncopal episodes as well as   bradycardia.  Currently, is doing better.  Her heart rate is in the 50s and   then metoprolol was held.  Blood pressure remains well controlled.    Complaining of fatigue, otherwise no headache, dizziness, no chest pain, no   shortness of breath, no abdominal pain, no nausea or vomiting.    REVIEW OF SYSTEMS:  All 12 points reveal negative except history of present   illness per CMS and AMA criteria.    PAST MEDICAL HISTORY:  End-stage renal disease on hemodialysis, hypertension,   recurrent deep venous thrombosis on anticoagulation, hypothyroidism, mitral   regurgitation.    PAST SURGICAL HISTORY:  Tonsillectomy, hysterectomy, AV fistula creation,   cataract surgery.    SOCIAL HISTORY:  Patient lives with her children.  No tobacco, alcohol or drug   use.    FAMILY HISTORY:  Positive for hypertension.    ALLERGIES:  No known drug allergies.    OUTPATIENT MEDICATIONS:  Cozaar, Sensipar, metoprolol, sevelamer and Zocor.    PHYSICAL EXAMINATION:  VITAL SIGNS:  Blood pressure 148/66, heart rate 53, temperature 36 Celsius.  GENERAL APPEARANCE:  Well-developed, thin female in no acute distress.  HEENT:  Normocephalic and atraumatic.  Pupils equal, round and reactive to   light.  Extraocular movements intact.  Nares patent.  Oropharynx clear, moist   mucosa, no erythema or exudate.  NECK:  Supple.  No lymphadenopathy.  No thyromegaly appreciated.  LUNGS:  Clear to auscultation bilaterally.  No rales, wheezes, no  rhonchi.  HEART:  Regular rate.  No rub or gallop.  ABDOMEN:  Soft, nontender, nondistended.  Bowel sounds present.  EXTREMITIES:  No cyanosis, clubbing, no edema.  NEUROLOGIC:  Alert and oriented x3.  No focal deficits.    LABORATORY RESULTS:  Reviewed, revealed a hemoglobin level 7.7, sodium 140,   potassium 4.7, BUN 20 and creatinine 5.0.    ASSESSMENT AND PLAN:  The patient is a very pleasant 82-year-old female with   end-stage renal disease on hemodialysis admitted with gastrointestinal   bleeding.  1.  End-stage renal disease.  We will continue as per patient's schedule   Monday, Wednesday, Friday.  We will dialyze today.  2.  Anemia, noticed drop in hemoglobin level.  We will hold heparin with   dialysis treatments.  We will add Epogen.  3.  Electrolytes remain well controlled.  4.  Hypertension.  Blood pressure under better control.  5.  Bradycardia, we would hold and discontinue beta blockers at this point.    RECOMMENDATIONS:  1..  Hemodialysis Monday, Wednesday, and Friday, Epogen with hemodialysis.  No   heparin.  2.  We will follow the patient closely.    Thank you for the consult.       ____________________________________     MD RODRICK ALMANZAR / LASHAY    DD:  06/12/2017 12:44:32  DT:  06/12/2017 17:03:45    D#:  [id number]  Job#:  [id number]

## 2017-06-13 NOTE — ASSESSMENT & PLAN NOTE
Xray shows arthritic changes and moderate effusion.  Consider tap if no improvement with conservative measures.  Ice pack PRN to knee.

## 2017-06-13 NOTE — PROGRESS NOTES
PIV lost 3 attempts to replace PIV unsuccessful. Will contact RN for ultrasound guided IV placement.

## 2017-06-13 NOTE — PROGRESS NOTES
Hospital Medicine Interval Note  Date of Service:  6/13/2017    Chief Complaint  82 y.o.-year-old female admitted 6/11/2017 with multiple medical issues including ESDR on HD, prior DVTs on chronic anticoagulation who presented when her family members noticed dark stool concerning for blood. No episodes since admission.     Interval Problem Update  Poor but pleasant historian. No episodes of bloody BM since admission but still anemic. Iron replacement ordered    Consultants/Specialty  Nephrology for HD    Dispo:  Home likely with home health once medically cleared.  Inpatient status- awaiting medical bed    Will require greater than 2 midnights stay       Review of Systems   Constitutional: Positive for malaise/fatigue. Negative for fever, chills and weight loss.        ROS difficult to complete due to mild dementia    HENT: Negative for hearing loss.    Eyes: Negative for blurred vision and double vision.   Cardiovascular: Negative for chest pain and palpitations.   Gastrointestinal: Negative for heartburn, nausea and abdominal pain.   Genitourinary: Negative for dysuria.   Musculoskeletal: Negative for myalgias and neck pain.   Skin: Negative.    Neurological: Positive for weakness. Negative for headaches.   Endo/Heme/Allergies: Does not bruise/bleed easily.   Psychiatric/Behavioral: Negative for depression.      Physical Exam Laboratory/Imaging   Filed Vitals:    06/13/17 0750 06/13/17 0752 06/13/17 0755 06/13/17 1135   BP: 143/68 144/63 114/56 137/60   Pulse: 58 62 72 63   Temp:    36.8 °C (98.2 °F)   Resp:    18   Height:       Weight:       SpO2:    100%     Physical Exam   Constitutional: She is oriented to person, place, and time. She appears well-developed and well-nourished. No distress.   HENT:   Head: Normocephalic and atraumatic.   Eyes: Pupils are equal, round, and reactive to light.   Neck: Normal range of motion. Neck supple.   Cardiovascular: Normal rate and regular rhythm.    Pulmonary/Chest: Effort  normal and breath sounds normal. No respiratory distress. She has no wheezes. She exhibits no tenderness.   Abdominal: Soft. Bowel sounds are normal. She exhibits no distension. There is no tenderness.   Musculoskeletal: Normal range of motion. She exhibits tenderness.   Left knee tenderness to lateral aspect consistent with effusion     Left leg strength 3/5 passive motion    Neurological: She is alert and oriented to person, place, and time.   Skin: Skin is warm and dry. She is not diaphoretic.   Psychiatric: She has a normal mood and affect.    Lab Results   Component Value Date/Time    WBC 5.0 06/13/2017 04:01 AM    HEMOGLOBIN 7.9* 06/13/2017 04:01 AM    HEMOGLOBIN 7.9* 06/13/2017 04:01 AM    HEMATOCRIT 25.9* 06/13/2017 04:01 AM    PLATELET COUNT 108* 06/13/2017 04:01 AM     Lab Results   Component Value Date/Time    SODIUM 136 06/13/2017 04:01 AM    POTASSIUM 4.2 06/13/2017 04:01 AM    CHLORIDE 102 06/13/2017 04:01 AM    CO2 31 06/13/2017 04:01 AM    GLUCOSE 83 06/13/2017 04:01 AM    BUN 18 06/13/2017 04:01 AM    CREATININE 3.72* 06/13/2017 04:01 AM      Assessment/Plan    Hematochezia (present on admission)  Assessment & Plan  - Stable, no further episodes since admission.  - Continue q8h Hgb and transfuse prn Hgb <7, currently 7.9  - occult stool negative     ESRD (end stage renal disease) on dialysis (CMS-[facility]) (present on admission)  Assessment & Plan  Consulted Nephrology to continue dialysis while inpatient.  Avoid nephrotoxins, renal dosing.  Appreciate their recommendations.    Syncope, near (present on admission)  Assessment & Plan  - Positive orthostatics- she did have dialysis yesterday but reports this started prior to HD- start Midodrine   - ECHO 3/2016 which was normal- consider repeat if midodrine does not improve     Left knee pain (present on admission)  Assessment & Plan  - Xray shows arthritic changes and moderate effusion. Watch effusion- if increases may consider tap for pain relief  -  continue PT    Anemia of chronic disease (present on admission)  Assessment & Plan  - q8hr and transfuse prn Hgb< 7.  - iron studies, Pharmacy replacement per kinetics  - chronically low Hgb, GI bleed not highly suspected at this time, will replace iron and continue to monitor     Essential hypertension (present on admission)  Assessment & Plan  - Moderately well controlled, continue Cozaar, Metoprolol, Hydralazine prn.    Chronic anticoagulation (present on admission)  Assessment & Plan  - Secondary to hx of DVTs, HOLDING DUE TO REPORT OF BLOOD IN STOOL.  - consider restarting tomorrow if Hgb stable and no further reports of bloody stool       Labs reviewed, Medications reviewed and Radiology images reviewed  Morataya catheter: No Morataya      DVT Prophylaxis: Contraindicated - High bleeding risk  DVT prophylaxis - mechanical: SCDs  Ulcer prophylaxis: Yes

## 2017-06-13 NOTE — PROGRESS NOTES
Nephrology Progress Note, Adult, Complex               Author: Deonna Fabio Date & Time created: 6/13/2017  1:46 PM     Interval History:  83 y/o female with ESRD/HD admitted with anemia, recta; bleeding, vivian, syncopal episode  Doing better.  C/o left knee pain  Vivian resolved  BP well controlled    Review of Systems:  Review of Systems   Constitutional: Positive for malaise/fatigue. Negative for fever and chills.   HENT: Negative for congestion and nosebleeds.    Eyes: Negative.    Respiratory: Negative for cough, hemoptysis, shortness of breath and wheezing.    Cardiovascular: Negative for chest pain, palpitations, orthopnea and leg swelling.   Gastrointestinal: Negative for nausea, vomiting, abdominal pain and blood in stool.   Genitourinary: Negative for dysuria.   Musculoskeletal: Positive for back pain and joint pain. Negative for myalgias.   Neurological: Negative for dizziness, sensory change, focal weakness and headaches.       Physical Exam:  Physical Exam   Constitutional: She is oriented to person, place, and time. She appears well-developed. No distress.   Cachectic female   HENT:   Head: Normocephalic and atraumatic.   Mouth/Throat: Oropharynx is clear and moist.   Eyes: Conjunctivae and EOM are normal. Pupils are equal, round, and reactive to light. No scleral icterus.   Neck: Normal range of motion. Neck supple.   Cardiovascular: Normal rate and regular rhythm.  Exam reveals no gallop and no friction rub.    Pulmonary/Chest: Effort normal and breath sounds normal. No respiratory distress. She has no wheezes. She has no rales.   Abdominal: Soft. Bowel sounds are normal. She exhibits no distension. There is no tenderness.   Musculoskeletal: She exhibits no edema.   Neurological: She is alert and oriented to person, place, and time. No cranial nerve deficit.   Nursing note and vitals reviewed.      Labs:        Invalid input(s): [id number]  Recent Labs      06/12/17   0207  06/12/17   0837   17   TROPONINI  0.07*  0.09*  0.09*     Recent Labs      17   SODIUM  138  140  136   POTASSIUM  4.2  4.7  4.2   CHLORIDE  99  101  102   CO2  34*  31  31   BUN  30*  28*  18   CREATININE  5.26*  5.08*  3.72*   CALCIUM  10.7*  10.4  9.4     Recent Labs      17   ALTSGPT  12  12   --    ASTSGOT  24  22   --    ALKPHOSPHAT  135*  127*   --    TBILIRUBIN  0.6  0.6   --    GLUCOSE  90  97  83     Recent Labs      17   1346  17   RBC  2.80*   --   3.05*   --    --    --   2.50*   HEMOGLOBIN  8.8*   < >  9.6*   < >  7.4*  9.3*  7.9*  7.9*   HEMATOCRIT  28.7*   --   31.1*   --    --    --   25.9*   PLATELETCT  116*   --   106*   --    --    --   108*   PROTHROMBTM  16.0*   --   15.5*   --    --    --    --    APTT  32.0   --   30.3   --    --    --    --    INR  1.24*   --   1.19*   --    --    --    --    IRON   --    --    --    --    --    --   27*   TOTIRONBC   --    --    --    --    --    --   217*    < > = values in this interval not displayed.     Recent Labs      17   WBC  4.7*  6.6  5.0   NEUTSPOLYS  62.90  51.40   --    LYMPHOCYTES  17.30*  32.80   --    MONOCYTES  12.40  9.50   --    EOSINOPHILS  6.60  5.60   --    BASOPHILS  0.60  0.50   --    ASTSGOT  24  22   --    ALTSGPT  12  12   --    ALKPHOSPHAT  135*  127*   --    TBILIRUBIN  0.6  0.6   --            Hemodynamics:  Temp (24hrs), Av.5 °C (97.7 °F), Min:36.1 °C (97 °F), Max:36.8 °C (98.2 °F)  Temperature: 36.8 °C (98.2 °F)  Pulse  Av  Min: 52  Max: 80   Blood Pressure : 137/60 mmHg     Respiratory:    Respiration: 18, Pulse Oximetry: 100 %           Fluids:    Intake/Output Summary (Last 24 hours) at 17 1346  Last data filed at 17 1705   Gross per 24 hour   Intake    500 ml   Output   3000 ml   Net  -2500 ml         GI/Nutrition:  Orders Placed This Encounter   Procedures   • Diet Order     Standing Status: Standing      Number of Occurrences: 1      Standing Expiration Date:      Order Specific Question:  Diet:     Answer:  Clear Liquids - No Red Foods [12]     Medical Decision Making, by Problem:  Active Hospital Problems    Diagnosis   • Hematochezia [K92.1]   • ESRD (end stage renal disease) on dialysis (CMS-[facility]) [N18.6, Z99.2]   • Chronic anticoagulation [Z79.01]   • Essential hypertension [I10]   • Anemia of chronic disease [D63.8]   • Syncope, near [R55]   • Left knee pain [M25.562]       Labs reviewed, Medications reviewed and EKG reviewed                      Assessment and Plan    1.ESRD/HD -MWF  2.HTN: BP well conttrolled  3.Electrolytes: well controlled.  4.Anemia:drop in Hb level -consider transfusion with HD  5.Volume:well controlled    Recs; HD MWF             All meds to renal doses

## 2017-06-13 NOTE — RESPIRATORY CARE
COPD EDUCATION by COPD CLINICAL EDUCATOR  6/13/2017 at 7:26 AM by Carolina Ann     Patient reviewed by COPD education team. Patient does not qualify for COPD program.

## 2017-06-13 NOTE — PROGRESS NOTES
[facility] Services Progress Note    Hemodialysis treatment ordered today per Dr. Mccarthy x 3 hours. Treatment initiated at 1405, ended at 1705.     Patient tolerated tx; see paper flow sheet for details.     Net UF 2500 mL.     Needles removed from access site. Dressings applied and sites held x 10 minutes; verified no bleeding. Positive bruit/thrill post tx. Staff RN instructed to monitor AVF for breakthrough bleeding. Should breakthrough bleeding occur staff RN instructed to apply pressure to access sites until bleeding resolved. Notify Dialysis and Nephrologist for follow-up.    Report given to Primary RN.

## 2017-06-13 NOTE — PROGRESS NOTES
While transporting pt from commode to bed, pt had another fainting episode. Loss of consciousness for less than a minute. Pulse and breathing present. Pt came to and was reoriented by NOC RN and Day RN. Blood sugar checked at 82. Hospitalist page for update.

## 2017-06-13 NOTE — PROGRESS NOTES
Bedside report received 1572. POC discussed with pt; Orthostatic complete; Pt complains of discomfort in left knee; heat packs provided; A&Ox4; No syncopal episodes throughout PM; all questions answered at this time.

## 2017-06-14 PROBLEM — F03.90 DEMENTIA (HCC): Chronic | Status: ACTIVE | Noted: 2017-06-14

## 2017-06-14 LAB
APPEARANCE UR: ABNORMAL
BACTERIA #/AREA URNS HPF: ABNORMAL /HPF
BILIRUB UR QL STRIP.AUTO: NEGATIVE
COLOR UR: YELLOW
EPI CELLS #/AREA URNS HPF: ABNORMAL /HPF
ERYTHROCYTE [DISTWIDTH] IN BLOOD BY AUTOMATED COUNT: 58.7 FL (ref 35.9–50)
GLUCOSE UR STRIP.AUTO-MCNC: NEGATIVE MG/DL
HCT VFR BLD AUTO: 26.7 % (ref 37–47)
HGB BLD-MCNC: 8.2 G/DL (ref 12–16)
KETONES UR STRIP.AUTO-MCNC: NEGATIVE MG/DL
LEUKOCYTE ESTERASE UR QL STRIP.AUTO: ABNORMAL
MCH RBC QN AUTO: 31.1 PG (ref 27–33)
MCHC RBC AUTO-ENTMCNC: 30.7 G/DL (ref 33.6–35)
MCV RBC AUTO: 101.1 FL (ref 81.4–97.8)
MICRO URNS: ABNORMAL
NITRITE UR QL STRIP.AUTO: NEGATIVE
PH UR STRIP.AUTO: 5.5 [PH]
PLATELET # BLD AUTO: 106 K/UL (ref 164–446)
PMV BLD AUTO: 10.9 FL (ref 9–12.9)
PROT UR QL STRIP: 100 MG/DL
RBC # BLD AUTO: 2.64 M/UL (ref 4.2–5.4)
RBC # URNS HPF: ABNORMAL /HPF
RBC UR QL AUTO: NEGATIVE
SP GR UR STRIP.AUTO: 1.01
WBC # BLD AUTO: 5.1 K/UL (ref 4.8–10.8)
WBC #/AREA URNS HPF: ABNORMAL /HPF

## 2017-06-14 PROCEDURE — 36415 COLL VENOUS BLD VENIPUNCTURE: CPT

## 2017-06-14 PROCEDURE — 90935 HEMODIALYSIS ONE EVALUATION: CPT

## 2017-06-14 PROCEDURE — 700111 HCHG RX REV CODE 636 W/ 250 OVERRIDE (IP): Performed by: HOSPITALIST

## 2017-06-14 PROCEDURE — 700102 HCHG RX REV CODE 250 W/ 637 OVERRIDE(OP): Performed by: NURSE PRACTITIONER

## 2017-06-14 PROCEDURE — 700101 HCHG RX REV CODE 250: Performed by: HOSPITALIST

## 2017-06-14 PROCEDURE — 700105 HCHG RX REV CODE 258: Performed by: NURSE PRACTITIONER

## 2017-06-14 PROCEDURE — A9270 NON-COVERED ITEM OR SERVICE: HCPCS | Performed by: HOSPITALIST

## 2017-06-14 PROCEDURE — 85027 COMPLETE CBC AUTOMATED: CPT

## 2017-06-14 PROCEDURE — 81001 URINALYSIS AUTO W/SCOPE: CPT

## 2017-06-14 PROCEDURE — 770006 HCHG ROOM/CARE - MED/SURG/GYN SEMI*

## 2017-06-14 PROCEDURE — 700111 HCHG RX REV CODE 636 W/ 250 OVERRIDE (IP): Performed by: NURSE PRACTITIONER

## 2017-06-14 PROCEDURE — A9270 NON-COVERED ITEM OR SERVICE: HCPCS

## 2017-06-14 PROCEDURE — 99233 SBSQ HOSP IP/OBS HIGH 50: CPT | Performed by: HOSPITALIST

## 2017-06-14 PROCEDURE — 700102 HCHG RX REV CODE 250 W/ 637 OVERRIDE(OP): Performed by: HOSPITALIST

## 2017-06-14 PROCEDURE — A9270 NON-COVERED ITEM OR SERVICE: HCPCS | Performed by: NURSE PRACTITIONER

## 2017-06-14 PROCEDURE — 700105 HCHG RX REV CODE 258: Performed by: HOSPITALIST

## 2017-06-14 PROCEDURE — 700101 HCHG RX REV CODE 250

## 2017-06-14 PROCEDURE — 700102 HCHG RX REV CODE 250 W/ 637 OVERRIDE(OP)

## 2017-06-14 RX ORDER — WARFARIN SODIUM 2 MG/1
2 TABLET ORAL
Status: DISCONTINUED | OUTPATIENT
Start: 2017-06-15 | End: 2017-06-15

## 2017-06-14 RX ORDER — LIDOCAINE HYDROCHLORIDE 10 MG/ML
INJECTION, SOLUTION INFILTRATION; PERINEURAL
Status: COMPLETED
Start: 2017-06-14 | End: 2017-06-14

## 2017-06-14 RX ORDER — WARFARIN SODIUM 4 MG/1
4 TABLET ORAL
Status: COMPLETED | OUTPATIENT
Start: 2017-06-14 | End: 2017-06-14

## 2017-06-14 RX ADMIN — SENNOSIDES AND DOCUSATE SODIUM 2 TABLET: 8.6; 5 TABLET ORAL at 20:27

## 2017-06-14 RX ADMIN — RENAGEL 800 MG: 800 TABLET ORAL at 11:19

## 2017-06-14 RX ADMIN — LIDOCAINE HYDROCHLORIDE 1 ML: 10 INJECTION, SOLUTION INFILTRATION; PERINEURAL at 07:40

## 2017-06-14 RX ADMIN — LOSARTAN POTASSIUM 25 MG: 50 TABLET, FILM COATED ORAL at 11:24

## 2017-06-14 RX ADMIN — SENNOSIDES AND DOCUSATE SODIUM 2 TABLET: 8.6; 5 TABLET ORAL at 11:18

## 2017-06-14 RX ADMIN — IRON SUCROSE 200 MG: 20 INJECTION, SOLUTION INTRAVENOUS at 11:17

## 2017-06-14 RX ADMIN — METOPROLOL SUCCINATE 25 MG: 25 TABLET, EXTENDED RELEASE ORAL at 11:25

## 2017-06-14 RX ADMIN — OMEPRAZOLE 20 MG: 20 CAPSULE, DELAYED RELEASE ORAL at 11:19

## 2017-06-14 RX ADMIN — MIDODRINE HYDROCHLORIDE 2.5 MG: 5 TABLET ORAL at 20:25

## 2017-06-14 RX ADMIN — OMEPRAZOLE 20 MG: 20 CAPSULE, DELAYED RELEASE ORAL at 20:26

## 2017-06-14 RX ADMIN — RENAGEL 800 MG: 800 TABLET ORAL at 20:24

## 2017-06-14 RX ADMIN — FOLIC ACID 1 MG: 5 INJECTION, SOLUTION INTRAMUSCULAR; INTRAVENOUS; SUBCUTANEOUS at 13:43

## 2017-06-14 RX ADMIN — WARFARIN SODIUM 4 MG: 4 TABLET ORAL at 20:27

## 2017-06-14 RX ADMIN — THIAMINE HYDROCHLORIDE 100 MG: 100 INJECTION, SOLUTION INTRAMUSCULAR; INTRAVENOUS at 12:49

## 2017-06-14 RX ADMIN — MIDODRINE HYDROCHLORIDE 2.5 MG: 5 TABLET ORAL at 11:25

## 2017-06-14 RX ADMIN — SIMVASTATIN 40 MG: 20 TABLET, FILM COATED ORAL at 20:26

## 2017-06-14 ASSESSMENT — ENCOUNTER SYMPTOMS
MYALGIAS: 0
DOUBLE VISION: 0
WEAKNESS: 1
NAUSEA: 0
ABDOMINAL PAIN: 0
BLURRED VISION: 0
BRUISES/BLEEDS EASILY: 0
DEPRESSION: 0
HEARTBURN: 0
FEVER: 0
HEADACHES: 0
NECK PAIN: 0
PALPITATIONS: 0
WEIGHT LOSS: 0
CHILLS: 0

## 2017-06-14 ASSESSMENT — PAIN SCALES - GENERAL
PAINLEVEL_OUTOF10: 2
PAINLEVEL_OUTOF10: 0
PAINLEVEL_OUTOF10: 2

## 2017-06-14 NOTE — PROGRESS NOTES
Report received, pt care assumed, tele box on. POC discussed with pt and verbalizes no questions. Pt denies any additional needs at this time. Bed in lowest position and locked. Pt educated on fall risk and verbalized understanding. Call light and personal belongings within reach. Will continue to monitor.

## 2017-06-14 NOTE — PROGRESS NOTES
Received report from LEOBARDO Mcintyre. Pt resting in bed. Repositioned for comfort. Tele monitor in place. No needs identified at this time.

## 2017-06-14 NOTE — PROGRESS NOTES
Hemodialysis ordered by Dr Mccarthy  For 3hrs on 3k acid bath. Treatment initiated at 0742 and ended at 1042.  Pt tolerated tx well. Net UF of 3002ml. Please see flowsheet for details.  Report given to charge XOCHILT BOOTH.    Pre and post tx, RUAAVF positive for bruit and thrill. Manual pressure held for 10mins then sites covered with clean and dry dressing, CDI.

## 2017-06-14 NOTE — PROGRESS NOTES
"Pt thinks the year is January 27, 2007. Pt able to state name, birthdate, states he is at [facility] and states he is here \"because of a heart attack or something\". Pt resting in bed at this time. Bed alarm on. Pt compliant and non compulsive. No previous hx of confusion in chart. Hospitalist paged for updates.  "

## 2017-06-14 NOTE — PROGRESS NOTES
Inpatient Anticoagulation Service Note    Date: 6/14/2017  Reason for Anticoagulation: Deep Vein Thrombosis  Hemoglobin Value: 8.2  Hematocrit Value: 26.7  Lab Platelet Value: 106  Target INR: 2.0 to 3.0    INR from last 7 days     Date/Time INR Value    06/12/17 0207 (!)1.19    06/11/17 2010 (!)1.24        Dose from last 7 days     Date/Time Dose (mg)    06/14/17 1300 4 mg PO now, then 2 mg PO daily        Significant Interactions: Statin    Comments: Patient has been refusing to take warfarin at [facility]. INR is at baseline. Looking at previous dosing will start there and see how patient does. Daily INR ordered.    Education Material Provided?: No (confused currently )  Pharmacist suggested discharge dosing: yet to be determined     Lion Parker, PharmD, BCPS

## 2017-06-14 NOTE — PROGRESS NOTES
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 No episodes since admission.     Interval Problem Update  6/13 Poor but pleasant historian. No episodes of bloody BM since admission but still anemic.   6/14 No changes, still confused to certain events- like she cant remember if she got HD today- but alert to self, place, time. Restarting Coumadin today    Consultants/Specialty  Nephrology for HD    Dispo:  Home likely with home health once medically cleared.  Inpatient status- awaiting medical bed  Await PT OT recs for placement plan     Will require greater than 2 midnights stay       Review of Systems   Constitutional: Positive for malaise/fatigue. Negative for fever, chills and weight loss.        ROS difficult to complete due to mild dementia    HENT: Negative for hearing loss.    Eyes: Negative for blurred vision and double vision.   Cardiovascular: Negative for chest pain and palpitations.   Gastrointestinal: Negative for heartburn, nausea and abdominal pain.   Genitourinary: Negative for dysuria.   Musculoskeletal: Negative for myalgias and neck pain.   Skin: Negative.    Neurological: Positive for weakness. Negative for headaches.   Endo/Heme/Allergies: Does not bruise/bleed easily.   Psychiatric/Behavioral: Negative for depression.      Physical Exam Laboratory/Imaging   Filed Vitals:    06/14/17 0053 06/14/17 0441 06/14/17 0722 06/14/17 1154   BP: 155/76 152/68 167/74 150/66   Pulse: 69 65 67 63   Temp: 36.8 °C (98.2 °F) 36.5 °C (97.7 °F) 36.6 °C (97.8 °F) 36.5 °C (97.7 °F)   Resp: 18 18 18 18   Height:       Weight:       SpO2: 99% 97% 100% 100%     Physical Exam   Constitutional: She is oriented to person, place, and time. She appears well-developed and  well-nourished. No distress.   HENT:   Head: Normocephalic and atraumatic.   Eyes: Pupils are equal, round, and reactive to light.   Neck: Normal range of motion. Neck supple.   Cardiovascular: Normal rate and regular rhythm.    Pulmonary/Chest: Effort normal and breath sounds normal. No respiratory distress. She has no wheezes. She exhibits no tenderness.   Abdominal: Soft. Bowel sounds are normal. She exhibits no distension. There is no tenderness.   Musculoskeletal: Normal range of motion. She exhibits tenderness.   Left knee tenderness to lateral aspect consistent with effusion     Left leg strength 3/5 passive motion    Neurological: She is alert and oriented to person, place, and time.   Skin: Skin is warm and dry. No rash noted. She is not diaphoretic.   Psychiatric: She has a normal mood and affect.    Lab Results   Component Value Date/Time    WBC 5.1 06/14/2017 01:57 AM    HEMOGLOBIN 8.2* 06/14/2017 01:57 AM    HEMATOCRIT 26.7* 06/14/2017 01:57 AM    PLATELET COUNT 106* 06/14/2017 01:57 AM     Lab Results   Component Value Date/Time    SODIUM 136 06/13/2017 04:01 AM    POTASSIUM 4.2 06/13/2017 04:01 AM    CHLORIDE 102 06/13/2017 04:01 AM    CO2 31 06/13/2017 04:01 AM    GLUCOSE 83 06/13/2017 04:01 AM    BUN 18 06/13/2017 04:01 AM    CREATININE 3.72* 06/13/2017 04:01 AM      Assessment/Plan    Hematochezia (present on admission)  Assessment & Plan  - Stable, no further episodes since admission.  - watch for s/s bleed but ok to restart chronic anticoagulation today   - occult stool negative     ESRD (end stage renal disease) on dialysis (CMS-[facility]) (present on admission)  Assessment & Plan  - Consulted Nephrology to continue dialysis while inpatient. HD today   - Avoid nephrotoxins, renal dosing.  - Appreciate their recommendations.    Syncope, near (present on admission)  Assessment & Plan  - Positive orthostatics but resolved with Midodrine overnight. Watch BP for hypertension   - ECHO 3/2016 which was  normal    Left knee pain (present on admission)  Assessment & Plan  - Xray shows arthritic changes and moderate effusion. Watch effusion- if increases may consider tap for pain relief  - continue PT OT, pain control     Dementia (present on admission)  Assessment & Plan  - pleasant but poor historian- doesn't even realize when shes getting dialysis and thinks she didn't get it      Anemia of chronic disease (present on admission)  Assessment & Plan  - q8hr and transfuse prn Hgb< 7.  - iron studies, Pharmacy replacement per kinetics  - chronically low Hgb, GI bleed not highly suspected at this time, will replace iron and continue to monitor     Essential hypertension (present on admission)  Assessment & Plan  - Moderately well controlled, continue Cozaar, Metoprolol, Hydralazine prn.    Chronic anticoagulation (present on admission)  Assessment & Plan  - Secondary to hx of DVTs- Hgb has been stable for 3 days, will restart Coumadin per pharmacy dosing          Labs reviewed, Medications reviewed and Radiology images reviewed  Morataya catheter: No Morataya      DVT Prophylaxis: Warfarin (Coumadin)  DVT prophylaxis - mechanical: SCDs  Ulcer prophylaxis: Yes

## 2017-06-14 NOTE — PROGRESS NOTES
Nephrology/Hemodialysis note    Patient with ESRD/HD admitted with rectal bleed  Seen and examined during HD -tolerates well  VS stable  3 H/ 2 K/ UF 2-3 L  Off heparin  Please see dialysis flow sheet for details

## 2017-06-14 NOTE — PROGRESS NOTES
Shannon called at ext. [phone] to come draw pt blood as this RN and CDU tech attempted to draw blood x2.

## 2017-06-14 NOTE — PROGRESS NOTES
Pt back from dialysis. Pt medicated per MAR. Pt denies any additional needs at this time. Will continue to monitor.

## 2017-06-14 NOTE — PROGRESS NOTES
Pt had 36 beats of accelerated idioventricular; pt sleeping and non-symptomatic. VANESSA Perdomo notified. No new orders at this time. Per Whitley Conroy, if pt sustains in rhythm or it pt symptomatic get EKG. Will continue to monitor.

## 2017-06-14 NOTE — PROGRESS NOTES
Assessment complete. A&Ox4 and sometimes forgetful. C/o pain 2/10. Will be medicated per MAR prn. Discussed POC w/ pt. Educated on medications per MAR. Discussed the importance of calling for needs and the importance of q2h turns as well as checking to ensure pt perineal area dry since pt incontinent. Pt verbalizes understanding. Bed alarm on. Call light in reach. Bed in lowest position. No other needs identified. Will continue hourly rounding.

## 2017-06-15 LAB
ANION GAP SERPL CALC-SCNC: 4 MMOL/L (ref 0–11.9)
BUN SERPL-MCNC: 17 MG/DL (ref 8–22)
CALCIUM SERPL-MCNC: 9.5 MG/DL (ref 8.5–10.5)
CHLORIDE SERPL-SCNC: 102 MMOL/L (ref 96–112)
CO2 SERPL-SCNC: 27 MMOL/L (ref 20–33)
CREAT SERPL-MCNC: 3.6 MG/DL (ref 0.5–1.4)
ERYTHROCYTE [DISTWIDTH] IN BLOOD BY AUTOMATED COUNT: 59.4 FL (ref 35.9–50)
GFR SERPL CREATININE-BSD FRML MDRD: 12 ML/MIN/1.73 M 2
GLUCOSE SERPL-MCNC: 75 MG/DL (ref 65–99)
HCT VFR BLD AUTO: 25.4 % (ref 37–47)
HGB BLD-MCNC: 7.8 G/DL (ref 12–16)
INR PPP: 1.66 (ref 0.87–1.13)
MCH RBC QN AUTO: 31.5 PG (ref 27–33)
MCHC RBC AUTO-ENTMCNC: 30.7 G/DL (ref 33.6–35)
MCV RBC AUTO: 102.4 FL (ref 81.4–97.8)
PLATELET # BLD AUTO: 114 K/UL (ref 164–446)
PMV BLD AUTO: 11.5 FL (ref 9–12.9)
POTASSIUM SERPL-SCNC: 4.6 MMOL/L (ref 3.6–5.5)
PROTHROMBIN TIME: 20.1 SEC (ref 12–14.6)
RBC # BLD AUTO: 2.48 M/UL (ref 4.2–5.4)
SODIUM SERPL-SCNC: 133 MMOL/L (ref 135–145)
WBC # BLD AUTO: 4.9 K/UL (ref 4.8–10.8)

## 2017-06-15 PROCEDURE — A9270 NON-COVERED ITEM OR SERVICE: HCPCS | Performed by: HOSPITALIST

## 2017-06-15 PROCEDURE — G8982 BODY POS GOAL STATUS: HCPCS | Mod: CJ

## 2017-06-15 PROCEDURE — A9270 NON-COVERED ITEM OR SERVICE: HCPCS | Performed by: NURSE PRACTITIONER

## 2017-06-15 PROCEDURE — 700102 HCHG RX REV CODE 250 W/ 637 OVERRIDE(OP): Performed by: NURSE PRACTITIONER

## 2017-06-15 PROCEDURE — G8987 SELF CARE CURRENT STATUS: HCPCS | Mod: CM

## 2017-06-15 PROCEDURE — 770006 HCHG ROOM/CARE - MED/SURG/GYN SEMI*

## 2017-06-15 PROCEDURE — 700102 HCHG RX REV CODE 250 W/ 637 OVERRIDE(OP): Performed by: HOSPITALIST

## 2017-06-15 PROCEDURE — A9270 NON-COVERED ITEM OR SERVICE: HCPCS

## 2017-06-15 PROCEDURE — 700111 HCHG RX REV CODE 636 W/ 250 OVERRIDE (IP): Performed by: NURSE PRACTITIONER

## 2017-06-15 PROCEDURE — 85610 PROTHROMBIN TIME: CPT

## 2017-06-15 PROCEDURE — G8988 SELF CARE GOAL STATUS: HCPCS | Mod: CJ

## 2017-06-15 PROCEDURE — 97161 PT EVAL LOW COMPLEX 20 MIN: CPT

## 2017-06-15 PROCEDURE — 99233 SBSQ HOSP IP/OBS HIGH 50: CPT | Performed by: HOSPITALIST

## 2017-06-15 PROCEDURE — G8981 BODY POS CURRENT STATUS: HCPCS | Mod: CM

## 2017-06-15 PROCEDURE — 85027 COMPLETE CBC AUTOMATED: CPT

## 2017-06-15 PROCEDURE — 700102 HCHG RX REV CODE 250 W/ 637 OVERRIDE(OP)

## 2017-06-15 PROCEDURE — 96367 TX/PROPH/DG ADDL SEQ IV INF: CPT

## 2017-06-15 PROCEDURE — 97166 OT EVAL MOD COMPLEX 45 MIN: CPT

## 2017-06-15 PROCEDURE — 700105 HCHG RX REV CODE 258: Performed by: NURSE PRACTITIONER

## 2017-06-15 PROCEDURE — 80048 BASIC METABOLIC PNL TOTAL CA: CPT

## 2017-06-15 RX ORDER — SEVELAMER HYDROCHLORIDE 800 MG/1
800 TABLET, FILM COATED ORAL 2 TIMES DAILY WITH MEALS
Status: DISCONTINUED | OUTPATIENT
Start: 2017-06-15 | End: 2017-06-23 | Stop reason: HOSPADM

## 2017-06-15 RX ORDER — WARFARIN SODIUM 1 MG/1
1 TABLET ORAL
Status: COMPLETED | OUTPATIENT
Start: 2017-06-15 | End: 2017-06-15

## 2017-06-15 RX ADMIN — OMEPRAZOLE 20 MG: 20 CAPSULE, DELAYED RELEASE ORAL at 20:53

## 2017-06-15 RX ADMIN — SIMVASTATIN 40 MG: 20 TABLET, FILM COATED ORAL at 20:53

## 2017-06-15 RX ADMIN — MIDODRINE HYDROCHLORIDE 2.5 MG: 5 TABLET ORAL at 08:28

## 2017-06-15 RX ADMIN — RENAGEL 800 MG: 800 TABLET ORAL at 08:28

## 2017-06-15 RX ADMIN — SENNOSIDES AND DOCUSATE SODIUM 2 TABLET: 8.6; 5 TABLET ORAL at 20:53

## 2017-06-15 RX ADMIN — RENAGEL 800 MG: 800 TABLET ORAL at 18:27

## 2017-06-15 RX ADMIN — OMEPRAZOLE 20 MG: 20 CAPSULE, DELAYED RELEASE ORAL at 08:26

## 2017-06-15 RX ADMIN — LOSARTAN POTASSIUM 25 MG: 50 TABLET, FILM COATED ORAL at 08:27

## 2017-06-15 RX ADMIN — MIDODRINE HYDROCHLORIDE 2.5 MG: 5 TABLET ORAL at 20:53

## 2017-06-15 RX ADMIN — SENNOSIDES AND DOCUSATE SODIUM 2 TABLET: 8.6; 5 TABLET ORAL at 08:26

## 2017-06-15 RX ADMIN — IRON SUCROSE 200 MG: 20 INJECTION, SOLUTION INTRAVENOUS at 10:12

## 2017-06-15 RX ADMIN — WARFARIN SODIUM 1 MG: 1 TABLET ORAL at 18:27

## 2017-06-15 ASSESSMENT — COGNITIVE AND FUNCTIONAL STATUS - GENERAL
CLIMB 3 TO 5 STEPS WITH RAILING: TOTAL
DRESSING REGULAR LOWER BODY CLOTHING: A LOT
MOBILITY SCORE: 10
TURNING FROM BACK TO SIDE WHILE IN FLAT BAD: A LOT
HELP NEEDED FOR BATHING: A LOT
PERSONAL GROOMING: A LOT
MOVING FROM LYING ON BACK TO SITTING ON SIDE OF FLAT BED: A LOT
SUGGESTED CMS G CODE MODIFIER MOBILITY: CL
MOVING TO AND FROM BED TO CHAIR: A LOT
EATING MEALS: A LITTLE
TOILETING: A LOT
DAILY ACTIVITIY SCORE: 13
SUGGESTED CMS G CODE MODIFIER DAILY ACTIVITY: CL
STANDING UP FROM CHAIR USING ARMS: A LOT
WALKING IN HOSPITAL ROOM: TOTAL
DRESSING REGULAR UPPER BODY CLOTHING: A LOT

## 2017-06-15 ASSESSMENT — ENCOUNTER SYMPTOMS
HEARTBURN: 0
EYES NEGATIVE: 1
DEPRESSION: 0
BLOOD IN STOOL: 0
WHEEZING: 0
BACK PAIN: 1
ABDOMINAL PAIN: 0
WEAKNESS: 1
SHORTNESS OF BREATH: 0
VOMITING: 0
FOCAL WEAKNESS: 0
MYALGIAS: 0
CHILLS: 0
HEADACHES: 0
DIZZINESS: 0
NAUSEA: 0
WEIGHT LOSS: 0
NECK PAIN: 0
FEVER: 0
HEMOPTYSIS: 0
COUGH: 0
BRUISES/BLEEDS EASILY: 0
ORTHOPNEA: 0
PALPITATIONS: 0
BLURRED VISION: 0
SENSORY CHANGE: 0
DOUBLE VISION: 0

## 2017-06-15 ASSESSMENT — ACTIVITIES OF DAILY LIVING (ADL): TOILETING: INDEPENDENT

## 2017-06-15 ASSESSMENT — GAIT ASSESSMENTS: GAIT LEVEL OF ASSIST: UNABLE TO PARTICIPATE

## 2017-06-15 ASSESSMENT — PAIN SCALES - GENERAL
PAINLEVEL_OUTOF10: 2
PAINLEVEL_OUTOF10: 0

## 2017-06-15 NOTE — PROGRESS NOTES
Pt take IV out stating she is ready to go home. Informed pt that she is not being discharged. Pt shows understanding. Attempted to establish new IV; unable to start IV. Will continue to monitor.

## 2017-06-15 NOTE — PROGRESS NOTES
Pt up to commode with 2 person assist. Pt has generalized weakness; pt tolerated well. Pt have large BM. Will continue to monitor.

## 2017-06-15 NOTE — PROGRESS NOTES
Assessment complete. A&Ox4. C/o pain 2/10. Will be medicated per MAR prn. Discussed POC w/ pt. Educated on medications per MAR. Discussed the importance of calling for needs and before getting OOB to prevent injury/falls. Pt verbalizes understanding. Bed alarm on. Call light in reach. Bed in lowest position. No other needs identified. Will continue hourly rounding.

## 2017-06-15 NOTE — THERAPY
"Occupational Therapy Evaluation completed.   Functional Status:  Max A supine>sit EOB, mod A x2 sit>stand c/o pain, max A sit>supine BTB limited activity tolerance d/t pain, mod A w/grooming poor postural control, however significant kyphotic posture, pt appears to be a poor historian unclear re: full PLOF   Plan of Care: Will benefit from Occupational Therapy 3 times per week  Discharge Recommendations:  Equipment: Will Continue to Assess for Equipment Needs. Post-acute therapy Discharge to a transitional care facility for continued skilled therapy services- depending on PLOF and level of assist available at home     82 yr old female admitted d/t concern for bloody stools, pt has a complex medical hx including end-stage renal disease, hypertension, DVT's, and appears to be a poor historian, pt reports she was recently at SNF, currently pt has c/o generalized weakness and pain in LLE unable to ambulate or transfer w/o physical assistance, no family present to confirm PLOF, pt will benefit from acute OT to maximize participation in ADL need further information from family re: PLOF     See \"Rehab Therapy-Acute\" Patient Summary Report for complete documentation.    "

## 2017-06-15 NOTE — PROGRESS NOTES
Pt states that she hasn't had dialysis yet. Informed pt that she just came back from dialysis and showed pt dialysis records in her chart. Pt continue to state that she didn't have dialysis and that she was taken to the second floor and given lunch.

## 2017-06-15 NOTE — THERAPY
"Physical Therapy Evaluation completed.   Bed Mobility:  Supine to Sit: Maximal Assist  Transfers: Sit to Stand: Maximal Assist  Gait: Level Of Assist: Unable to Participate with No Equipment Needed       Plan of Care: Will benefit from Physical Therapy 3 times per week  Discharge Recommendations: Equipment: Will Continue to Assess for Equipment Needs. Post-acute therapy Discharge to a transitional care facility for continued skilled therapy services.    See \"Rehab Therapy-Acute\" Patient Summary Report for complete documentation.     "

## 2017-06-15 NOTE — PROGRESS NOTES
Nephrology Progress Note, Adult, Complex               Author: Deonna Fabio Date & Time created: 6/15/2017  1:15 PM     Interval History:  81 y/o female with ESRD/HD admitted with anemia, recta; bleeding, vivian, syncopal episode  Doing better.  C/o left knee pain  Vivian resolved  BP well controlled    Review of Systems:  Review of Systems   Constitutional: Positive for malaise/fatigue. Negative for fever and chills.   HENT: Negative for congestion and nosebleeds.    Eyes: Negative.    Respiratory: Negative for cough, hemoptysis, shortness of breath and wheezing.    Cardiovascular: Negative for chest pain, palpitations, orthopnea and leg swelling.   Gastrointestinal: Negative for nausea, vomiting, abdominal pain and blood in stool.   Genitourinary: Negative for dysuria.   Musculoskeletal: Positive for back pain and joint pain. Negative for myalgias.   Neurological: Negative for dizziness, sensory change, focal weakness and headaches.       Physical Exam:  Physical Exam   Constitutional: She is oriented to person, place, and time. She appears well-developed. No distress.   Cachectic female   HENT:   Head: Normocephalic and atraumatic.   Mouth/Throat: Oropharynx is clear and moist.   Eyes: Conjunctivae and EOM are normal. Pupils are equal, round, and reactive to light. No scleral icterus.   Neck: Normal range of motion. Neck supple.   Cardiovascular: Normal rate and regular rhythm.  Exam reveals no gallop and no friction rub.    Pulmonary/Chest: Effort normal and breath sounds normal. No respiratory distress. She has no wheezes. She has no rales.   Abdominal: Soft. Bowel sounds are normal. She exhibits no distension. There is no tenderness.   Musculoskeletal: She exhibits no edema.   Neurological: She is alert and oriented to person, place, and time. No cranial nerve deficit.   Nursing note and vitals reviewed.      Labs:        Invalid input(s): [id number]  Recent Labs      06/12/17 1938   TROPONINI  0.09*      Recent Labs      17   0401  06/15/17   0011   SODIUM  136  133*   POTASSIUM  4.2  4.6   CHLORIDE  102  102   CO2  31  27   BUN  18  17   CREATININE  3.72*  3.60*   CALCIUM  9.4  9.5     Recent Labs      17   0401  06/15/17   0011   GLUCOSE  83  75     Recent Labs      17   0401  17   1337  17   0157  06/15/17   0011   RBC  2.50*   --   2.64*  2.48*   HEMOGLOBIN  7.9*  7.9*  8.4*  8.2*  7.8*   HEMATOCRIT  25.9*   --   26.7*  25.4*   PLATELETCT  108*   --   106*  114*   PROTHROMBTM   --    --    --   20.1*   INR   --    --    --   1.66*   IRON  27*   --    --    --    TOTIRONBC  217*   --    --    --      Recent Labs      17   0401  17   0157  06/15/17   0011   WBC  5.0  5.1  4.9           Hemodynamics:  Temp (24hrs), Av.8 °C (98.2 °F), Min:36.4 °C (97.6 °F), Max:37.1 °C (98.7 °F)  Temperature: 37.1 °C (98.7 °F)  Pulse  Av.4  Min: 52  Max: 80   Blood Pressure : 142/59 mmHg     Respiratory:    Respiration: (!) 22, Pulse Oximetry: 100 %           Fluids:  No intake or output data in the 24 hours ending 06/15/17 1315  Weight: 51.8 kg (114 lb 3.2 oz)  GI/Nutrition:  Orders Placed This Encounter   Procedures   • Diet Order     Standing Status: Standing      Number of Occurrences: 1      Standing Expiration Date:      Order Specific Question:  Diet:     Answer:  Clear Liquids - No Red Foods [12]      Comments:  No beef flavor. Pt prefers chicken     Medical Decision Making, by Problem:  Active Hospital Problems    Diagnosis   • Hematochezia [K92.1]   • ESRD (end stage renal disease) on dialysis (CMS-HCC) [N18.6, Z99.2]   • Chronic anticoagulation [Z79.01]   • Essential hypertension [I10]   • Anemia of chronic disease [D63.8]   • Syncope, near [R55]   • Left knee pain [M25.562]       Labs reviewed, Medications reviewed and EKG reviewed                      Assessment and Plan    1.ESRD/HD -MWF  2.HTN: BP well conttrolled  3.Electrolytes: well controlled.  4.Anemia:drop  in Hb level - to monitor -Epogen with HD  5.Volume:well controlled    Recs; HD MWF             All meds to renal doses

## 2017-06-15 NOTE — PROGRESS NOTES
Inpatient Anticoagulation Service Note    Date: 6/15/2017  Reason for Anticoagulation: Deep Vein Thrombosis  Hemoglobin Value: 7.8  Hematocrit Value: 25.4  Lab Platelet Value: 114  Target INR: 2.0 to 3.0  INR from last 7 days     Date/Time INR Value    06/15/17 0011 (!)1.66    06/12/17 0207 (!)1.19    06/11/17 2010 (!)1.24        Dose from last 7 days     Date/Time Dose (mg)    06/15/17 1300 1    06/14/17 1300 4        Significant Interactions: Statin     Comments:   Patient has been refusing to take warfarin at [facility]. INR appears to be uptrending fast (no INR yesterday so hard to say). Stillsub therapeutic.  Strong response to warfarin 4 mg previously (5/17/17 4mg x3 days, was on ABX at this time). Warfarin 1mg tonight, likely increase dose tomorrow.  H/H slight drop today. Discussed with APN no signs/symptoms of bleeding. Monitor. Likely due to ESRD.  Daily INR ordered.     Plan:  Warfarin 1mg tonight, Trend INR. Pharmacy will follow.  Education Material Provided?: No (confused currently )  Pharmacist suggested discharge dosing: To be determined     Juan Miguel Conley, PharmD, BCPS

## 2017-06-15 NOTE — PROGRESS NOTES
Pt having episodes of forgetfulness. Pt woke up believing she was going home. Pt having to be reoriented to POC on multiple occasions. Pt is A&Ox4. Went over POC with pt; pt shows understanding and denies any questions at this time. Will continue to monitor.

## 2017-06-15 NOTE — PROGRESS NOTES
Received report from LEOBARDO Jorgensen. Pt resting comfortably in bed w/ tele monitor in place and food at bedside. No needs identified at this time.

## 2017-06-15 NOTE — PROGRESS NOTES
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 No episodes since admission.     Interval Problem Update  6/13 Poor but pleasant historian. No episodes of bloody BM since admission but still anemic.   6/14 No changes, still confused to certain events- like she cant remember if she got HD today- but alert to self, place, time. Restarting Coumadin today  6/15 No overnight changes. Still waiting for PT to assess for SNF needs. Sending to medical floors for further discharge planning.     Consultants/Specialty  Nephrology for HD    Dispo:  Home likely with home health once medically cleared.  Inpatient status- awaiting medical bed  Await PT OT recs for placement plan     Will require greater than 2 midnights stay       Review of Systems   Constitutional: Positive for malaise/fatigue. Negative for fever, chills and weight loss.        ROS difficult to complete due to mild dementia    HENT: Negative for hearing loss.    Eyes: Negative for blurred vision and double vision.   Cardiovascular: Negative for chest pain and palpitations.   Gastrointestinal: Negative for heartburn, nausea and abdominal pain.   Genitourinary: Negative for dysuria.   Musculoskeletal: Negative for myalgias and neck pain.   Skin: Negative.    Neurological: Positive for weakness. Negative for headaches.   Endo/Heme/Allergies: Does not bruise/bleed easily.   Psychiatric/Behavioral: Negative for depression.      Physical Exam Laboratory/Imaging   Filed Vitals:    06/15/17 0020 06/15/17 0435 06/15/17 0729 06/15/17 1120   BP: 137/79 149/66 143/67 154/67   Pulse: 67 56 67 66   Temp: 36.8 °C (98.3 °F) 36.8 °C (98.2 °F) 36.7 °C (98.1 °F) 36.4 °C (97.6 °F)   Resp: 18 18 19 16   Height:       Weight:       SpO2: 100%  100% 100% 100%     Physical Exam   Constitutional: She is oriented to person, place, and time. She appears well-developed and well-nourished. No distress.   HENT:   Head: Normocephalic and atraumatic.   Eyes: Pupils are equal, round, and reactive to light.   Neck: Normal range of motion. Neck supple.   Cardiovascular: Normal rate and regular rhythm.    Pulmonary/Chest: Effort normal and breath sounds normal. No respiratory distress. She has no wheezes. She exhibits no tenderness.   Abdominal: Soft. Bowel sounds are normal. She exhibits no distension. There is no tenderness.   Musculoskeletal: Normal range of motion. She exhibits tenderness.   Left knee tenderness to lateral aspect consistent with effusion     Left leg strength 3/5 passive motion    Neurological: She is alert and oriented to person, place, and time.   Skin: Skin is warm and dry. No rash noted. She is not diaphoretic.   Psychiatric: She has a normal mood and affect.    Lab Results   Component Value Date/Time    WBC 4.9 06/15/2017 12:11 AM    HEMOGLOBIN 7.8* 06/15/2017 12:11 AM    HEMATOCRIT 25.4* 06/15/2017 12:11 AM    PLATELET COUNT 114* 06/15/2017 12:11 AM     Lab Results   Component Value Date/Time    SODIUM 133* 06/15/2017 12:11 AM    POTASSIUM 4.6 06/15/2017 12:11 AM    CHLORIDE 102 06/15/2017 12:11 AM    CO2 27 06/15/2017 12:11 AM    GLUCOSE 75 06/15/2017 12:11 AM    BUN 17 06/15/2017 12:11 AM    CREATININE 3.60* 06/15/2017 12:11 AM      Assessment/Plan    Hematochezia (present on admission)  Assessment & Plan  - Stable, no further episodes since admission.  - watch for s/s bleed but ok to restart chronic anticoagulation today   - occult stool negative     ESRD (end stage renal disease) on dialysis ([facility]) (present on admission)  Assessment & Plan  - Consulted Nephrology to continue dialysis while inpatient.   - Avoid nephrotoxins, renal dosing.  - Appreciate their recommendations.    Syncope, near (present on admission)  Assessment & Plan  -  Positive orthostatics but resolved with Midodrine overnight. Watch BP for hypertension   - ECHO 3/2016 which was normal    Left knee pain (present on admission)  Assessment & Plan  - Xray shows arthritic changes and moderate effusion. Watch effusion- if increases may consider tap for pain relief  - continue PT OT, pain control     Dementia (present on admission)  Assessment & Plan  - pleasant but poor historian- doesn't even realize when shes getting dialysis and thinks she didn't get it      Anemia of chronic disease (present on admission)  Assessment & Plan  - q8hr and transfuse prn Hgb< 7.  - iron studies, Pharmacy replacement per kinetics  - chronically low Hgb, GI bleed not highly suspected at this time, will replace iron and continue to monitor     Essential hypertension (present on admission)  Assessment & Plan  - Moderately well controlled, continue Cozaar, Metoprolol, Hydralazine prn.    Chronic anticoagulation (present on admission)  Assessment & Plan  - Secondary to hx of DVTs- Hgb has been stable - continue Coumadin per pharm          Labs reviewed, Medications reviewed and Radiology images reviewed  Morataya catheter: No Morataya      DVT Prophylaxis: Warfarin (Coumadin)  DVT prophylaxis - mechanical: SCDs  Ulcer prophylaxis: Yes

## 2017-06-16 LAB
ANION GAP SERPL CALC-SCNC: 5 MMOL/L (ref 0–11.9)
BNP SERPL-MCNC: 2210 PG/ML (ref 0–100)
BUN SERPL-MCNC: 26 MG/DL (ref 8–22)
CALCIUM SERPL-MCNC: 9.6 MG/DL (ref 8.5–10.5)
CHLORIDE SERPL-SCNC: 103 MMOL/L (ref 96–112)
CO2 SERPL-SCNC: 27 MMOL/L (ref 20–33)
CREAT SERPL-MCNC: 4.76 MG/DL (ref 0.5–1.4)
GFR SERPL CREATININE-BSD FRML MDRD: 9 ML/MIN/1.73 M 2
GLUCOSE SERPL-MCNC: 73 MG/DL (ref 65–99)
INR PPP: 2.29 (ref 0.87–1.13)
POTASSIUM SERPL-SCNC: 4.6 MMOL/L (ref 3.6–5.5)
PROTHROMBIN TIME: 25.9 SEC (ref 12–14.6)
SODIUM SERPL-SCNC: 135 MMOL/L (ref 135–145)

## 2017-06-16 PROCEDURE — G9169 MEMORY GOAL STATUS: HCPCS | Mod: CK

## 2017-06-16 PROCEDURE — 700102 HCHG RX REV CODE 250 W/ 637 OVERRIDE(OP): Performed by: NURSE PRACTITIONER

## 2017-06-16 PROCEDURE — A9270 NON-COVERED ITEM OR SERVICE: HCPCS | Performed by: NURSE PRACTITIONER

## 2017-06-16 PROCEDURE — 99232 SBSQ HOSP IP/OBS MODERATE 35: CPT | Performed by: HOSPITALIST

## 2017-06-16 PROCEDURE — 80048 BASIC METABOLIC PNL TOTAL CA: CPT

## 2017-06-16 PROCEDURE — 770006 HCHG ROOM/CARE - MED/SURG/GYN SEMI*

## 2017-06-16 PROCEDURE — 700102 HCHG RX REV CODE 250 W/ 637 OVERRIDE(OP): Performed by: INTERNAL MEDICINE

## 2017-06-16 PROCEDURE — G9168 MEMORY CURRENT STATUS: HCPCS | Mod: CL

## 2017-06-16 PROCEDURE — A9270 NON-COVERED ITEM OR SERVICE: HCPCS

## 2017-06-16 PROCEDURE — A9270 NON-COVERED ITEM OR SERVICE: HCPCS | Performed by: HOSPITALIST

## 2017-06-16 PROCEDURE — 700105 HCHG RX REV CODE 258: Performed by: NURSE PRACTITIONER

## 2017-06-16 PROCEDURE — 700102 HCHG RX REV CODE 250 W/ 637 OVERRIDE(OP): Performed by: HOSPITALIST

## 2017-06-16 PROCEDURE — 700102 HCHG RX REV CODE 250 W/ 637 OVERRIDE(OP)

## 2017-06-16 PROCEDURE — A9270 NON-COVERED ITEM OR SERVICE: HCPCS | Performed by: INTERNAL MEDICINE

## 2017-06-16 PROCEDURE — 92523 SPEECH SOUND LANG COMPREHEN: CPT

## 2017-06-16 PROCEDURE — 700111 HCHG RX REV CODE 636 W/ 250 OVERRIDE (IP): Performed by: NURSE PRACTITIONER

## 2017-06-16 PROCEDURE — 90935 HEMODIALYSIS ONE EVALUATION: CPT

## 2017-06-16 PROCEDURE — 83880 ASSAY OF NATRIURETIC PEPTIDE: CPT

## 2017-06-16 PROCEDURE — 85610 PROTHROMBIN TIME: CPT

## 2017-06-16 PROCEDURE — 36415 COLL VENOUS BLD VENIPUNCTURE: CPT

## 2017-06-16 RX ORDER — HYDROCODONE BITARTRATE AND ACETAMINOPHEN 5; 325 MG/1; MG/1
1 TABLET ORAL EVERY 6 HOURS PRN
Status: COMPLETED | OUTPATIENT
Start: 2017-06-16 | End: 2017-06-16

## 2017-06-16 RX ORDER — WARFARIN SODIUM 1 MG/1
0.5 TABLET ORAL
Status: COMPLETED | OUTPATIENT
Start: 2017-06-16 | End: 2017-06-16

## 2017-06-16 RX ORDER — ACETAMINOPHEN 325 MG/1
650 TABLET ORAL EVERY 4 HOURS PRN
Status: DISCONTINUED | OUTPATIENT
Start: 2017-06-16 | End: 2017-06-19

## 2017-06-16 RX ADMIN — SENNOSIDES AND DOCUSATE SODIUM 2 TABLET: 8.6; 5 TABLET ORAL at 21:16

## 2017-06-16 RX ADMIN — OMEPRAZOLE 20 MG: 20 CAPSULE, DELAYED RELEASE ORAL at 12:32

## 2017-06-16 RX ADMIN — WARFARIN SODIUM 0.5 MG: 1 TABLET ORAL at 17:47

## 2017-06-16 RX ADMIN — IRON SUCROSE 200 MG: 20 INJECTION, SOLUTION INTRAVENOUS at 12:49

## 2017-06-16 RX ADMIN — SIMVASTATIN 40 MG: 20 TABLET, FILM COATED ORAL at 21:16

## 2017-06-16 RX ADMIN — HYDROCODONE BITARTRATE AND ACETAMINOPHEN 1 TABLET: 5; 325 TABLET ORAL at 03:17

## 2017-06-16 RX ADMIN — MIDODRINE HYDROCHLORIDE 2.5 MG: 5 TABLET ORAL at 21:16

## 2017-06-16 RX ADMIN — LOSARTAN POTASSIUM 25 MG: 50 TABLET, FILM COATED ORAL at 12:33

## 2017-06-16 RX ADMIN — METOPROLOL SUCCINATE 25 MG: 25 TABLET, EXTENDED RELEASE ORAL at 12:33

## 2017-06-16 RX ADMIN — MIDODRINE HYDROCHLORIDE 2.5 MG: 5 TABLET ORAL at 12:32

## 2017-06-16 RX ADMIN — HYDROCODONE BITARTRATE AND ACETAMINOPHEN 1 TABLET: 5; 325 TABLET ORAL at 11:34

## 2017-06-16 RX ADMIN — RENAGEL 800 MG: 800 TABLET ORAL at 17:47

## 2017-06-16 RX ADMIN — SENNOSIDES AND DOCUSATE SODIUM 2 TABLET: 8.6; 5 TABLET ORAL at 12:34

## 2017-06-16 RX ADMIN — OMEPRAZOLE 20 MG: 20 CAPSULE, DELAYED RELEASE ORAL at 21:16

## 2017-06-16 RX ADMIN — HYDROCODONE BITARTRATE AND ACETAMINOPHEN 1 TABLET: 5; 325 TABLET ORAL at 21:32

## 2017-06-16 ASSESSMENT — PAIN SCALES - GENERAL
PAINLEVEL_OUTOF10: ASSUMED PAIN PRESENT
PAINLEVEL_OUTOF10: 6
PAINLEVEL_OUTOF10: 4
PAINLEVEL_OUTOF10: 5

## 2017-06-16 ASSESSMENT — ENCOUNTER SYMPTOMS
DEPRESSION: 0
BLURRED VISION: 0
WEAKNESS: 1
CHILLS: 0
WEIGHT LOSS: 0
DOUBLE VISION: 0
NAUSEA: 0
FEVER: 0
NECK PAIN: 0
ABDOMINAL PAIN: 0
PALPITATIONS: 0
MYALGIAS: 0
BRUISES/BLEEDS EASILY: 0
HEADACHES: 0
HEARTBURN: 0

## 2017-06-16 NOTE — PROGRESS NOTES
Hemodialysis ordered by Dr. Mccarthy. Treatment started at 0841 and ended at 1141. Pt stable, vss, no c/o post tx. See flow sheets for details. Net UF 2.0 liters. Report to CARLYLE Torres RN. RT UA AVF dressing clean, dry, intact.

## 2017-06-16 NOTE — CARE PLAN
Problem: Nutritional:  Goal: Achieve adequate nutritional intake  Patient will consume 50% of meals  Outcome: MET Date Met:  06/16/17

## 2017-06-16 NOTE — PROGRESS NOTES
Received pt from cdu today at 1230 transferred to [facility]. Pt is awake alert confused knows name but thinks everyone has been lying to her. Denies getting dialysis yesterday. Upset with diet wants fried chicken. Complains of left knee pain and doesn't want us to touch it,and states she can't walk due to pain. Hot pack applied. Bruit and thrill noted right upper arm. Progressed diet to regular renal diet. Reoriented at needed.

## 2017-06-16 NOTE — THERAPY
"Speech Language Therapy Evaluation completed to address cognition  Functional Status:  Parts of MMSE completed with pt scoring 8/23 on items that were completed. Unable to assess simple directive following and writing/drawing portions of the MMSE r/t pt back from dialysis with small dressing on her fistula. Pt's RUE straight and on pillow with pt stating \"I am not supposed to bend it because it was bleeding.\"  Dressing dry and intact during eval. Pt awake and alert with variable appropriateness in responses such as stating year \"1720\" and accurate with stating month and day of week. Pt responding \"I know the month because I just had a bday\" demonstrating some STM and LTM recall. Pt stating her eyes have turned from brown to blue related to \"the medications that have been given to me.\" Pt talkative and attempts all tasks. She was accurate in verbally reading large print words with a field of 3 and choosing the correct word that matched a single picture. Pt recalling the name of the hospital with choice of two and adding \"I know it is on [facility] [employer].\" Pt then requiring choice of two cues regarding what city and state [facility] is located. Overall, pt is demonstrating severe cognitive linguistic deficits. Possible change in pt's cognition baseline related to nephology concerned regarding pt's mentation per SLP orders.   Recommendations:  1 times weekly to target written cues for temporal and spatial orientation.  Plan of Care: Will benefit from Speech Therapy 1 times per week  Post-Acute Therapy: Discharge to a transitional care facility for continued skilled therapy services.Thanks, Reid    See \"Rehab Therapy-Acute\" Patient Summary Report for complete documentation.   "

## 2017-06-16 NOTE — PROGRESS NOTES
Nephrology/Hemodialysis note    Patient with ESRD/HD admitted with rectal bleed  Seen and examined during HD -tolerates well  VS stable  3 H/ 3 K/ UF 2-3 L  Off heparin  Please see dialysis flow sheet for details

## 2017-06-16 NOTE — PROGRESS NOTES
Inpatient Anticoagulation Service Note    Date: 6/16/2017  Reason for Anticoagulation: Deep Vein Thrombosis        Hemoglobin Value: 7.8  Hematocrit Value: 25.4  Lab Platelet Value: 114  Target INR: 2.0 to 3.0    INR from last 7 days     Date/Time INR Value    06/16/17 0233 (!)2.29    06/15/17 0011 (!)1.66    06/12/17 0207 (!)1.19    06/11/17 2010 (!)1.24        Dose from last 7 days     Date/Time Dose (mg)    06/16/17 1500 0.5    06/15/17 1300 2    06/14/17 1300 4        Significant Interactions: Statin  Bridge Therapy: No     Comments: Patient transferred to neuro overnight. No S/S bleeding noted. INR with pretty drastic increase. Will greatly reduce dose x 1 and check INR again tomorrow. Will continue to follow.    Plan:  0.5 mg tonight, INR tomorrow  Education Material Provided?: No (chronic warfarin patient )  Pharmacist suggested discharge dosing: CRISTIAN Parisi, PHARMD

## 2017-06-16 NOTE — PROGRESS NOTES
Hospital Medicine Interval Note  Date of Service:  6/16/2017    Chief Complaint  82 y.o.-year-old female admitted 6/11/2017 with multiple medical issues including ESDR on HD, prior DVTs on chronic anticoagulation who presented when her family members noticed dark stool concerning for blood. No episodes since admission.     Interval Problem Update  To HD today, awaiting PT assessment for SNF needs, mild drop in Hgb    Consultants/Specialty  Nephrology for HD    Dispo:  Home likely with home health once medically cleared vs PT/OT recs for placement plan        Review of Systems   Constitutional: Positive for malaise/fatigue. Negative for fever, chills and weight loss.        ROS difficult to complete due to mild dementia    HENT: Negative for hearing loss.    Eyes: Negative for blurred vision and double vision.   Cardiovascular: Negative for chest pain and palpitations.   Gastrointestinal: Negative for heartburn, nausea and abdominal pain.   Genitourinary: Negative for dysuria.   Musculoskeletal: Negative for myalgias and neck pain.   Skin: Negative.    Neurological: Positive for weakness. Negative for headaches.   Endo/Heme/Allergies: Does not bruise/bleed easily.   Psychiatric/Behavioral: Negative for depression.      Physical Exam Laboratory/Imaging   Filed Vitals:    06/15/17 1600 06/15/17 2000 06/16/17 0322 06/16/17 0700   BP: 144/61 141/61 143/55 121/56   Pulse: 59 63 64 54   Temp: 36.7 °C (98.1 °F) 36.8 °C (98.3 °F) 36.6 °C (97.9 °F) 36.2 °C (97.1 °F)   Resp: 22 18 18 18   Height:       Weight:       SpO2: 98% 95% 98% 100%     Physical Exam   Constitutional: She is oriented to person, place, and time. She appears well-developed and well-nourished. No distress.   HENT:   Head: Normocephalic and atraumatic.   Eyes: Pupils are equal, round, and reactive to light.   Neck: Normal range of motion. Neck supple.   Cardiovascular: Normal rate and regular rhythm.    Pulmonary/Chest: Effort normal and breath sounds  normal. No respiratory distress. She has no wheezes. She exhibits no tenderness.   Abdominal: Soft. Bowel sounds are normal. She exhibits no distension. There is no tenderness.   Musculoskeletal: Normal range of motion. She exhibits tenderness.   Left knee tenderness to lateral aspect consistent with effusion     Left leg strength 3/5 passive motion    Neurological: She is alert and oriented to person, place, and time.   Skin: Skin is warm and dry. No rash noted. She is not diaphoretic.   Psychiatric: She has a normal mood and affect.    Lab Results   Component Value Date/Time    WBC 4.9 06/15/2017 12:11 AM    HEMOGLOBIN 7.8* 06/15/2017 12:11 AM    HEMATOCRIT 25.4* 06/15/2017 12:11 AM    PLATELET COUNT 114* 06/15/2017 12:11 AM     Lab Results   Component Value Date/Time    SODIUM 135 06/16/2017 02:33 AM    POTASSIUM 4.6 06/16/2017 02:33 AM    CHLORIDE 103 06/16/2017 02:33 AM    CO2 27 06/16/2017 02:33 AM    GLUCOSE 73 06/16/2017 02:33 AM    BUN 26* 06/16/2017 02:33 AM    CREATININE 4.76* 06/16/2017 02:33 AM      Assessment/Plan    Hematochezia (present on admission)  Assessment & Plan  - Stable, no further episodes since admission.  - Hgb mildly down, resumed chronic anticoagulation yesterday  - Occult stool negative   - Watch for active bleed and continue to trend H/H  - Iron replacement    ESRD (end stage renal disease) on dialysis (CMS-[facility]) (present on admission)  Assessment & Plan  - Nephrology following for inpatient HD  - Avoid nephrotoxins, renal dosing    Syncope, near (present on admission)  Assessment & Plan  - Positive orthostatics but resolved with Midodrine. Watch BP for hypertension   - ECHO 3/2016 which was normal    Left knee pain (present on admission)  Assessment & Plan  - Xray shows arthritic changes and moderate effusion  - Consider tap if no improvement with conservative measures  - Continue PT OT, pain control     Dementia (present on admission)  Assessment & Plan  - Pleasantly demented,  appears to be baseline, will check with family    Essential hypertension (present on admission)  Assessment & Plan  - Currently controlled, continue Cozaar, Metoprolol, Hydralazine PRN    Chronic anticoagulation (present on admission)  Assessment & Plan  - Secondary to hx of DVTs- Hgb has been stable - continue Coumadin per pharm          Labs reviewed, Medications reviewed and Radiology images reviewed  Morataya catheter: No Morataya      DVT Prophylaxis: Warfarin (Coumadin)  DVT prophylaxis - mechanical: SCDs  Ulcer prophylaxis: Yes

## 2017-06-16 NOTE — DIETARY
"Nutrition Services: Pt seen for poor po and weight loss PTA per admit screen    Pt is an 81 yo female with dx of hematochezia, ESRD on dialysis, HTN. PMHx includes hx of recurrent DVTs, hypothyroidism, PAH, mitral regurgitation. Pt is currently on a Renal diet. Pt has consumed % of 2 documented meals. Upon interview, pt states her appetite is \"so-so\". Pt reported wt loss in admit screen but could not recall losing weight upon interview. Pt currently weighs 51.8 kg. Pt states she drinks Ensure at home twice per day, discussed continuing this regimen during her admission here, pt agreed to this. Encouraged pt to consume >50% of meals to ensure adequate nutrition, pt verbalized understanding.     Plan/Recommend: Encourage PO intake. Recommend Boost Plus BID with meals. Nutrition Rep to see patient daily for meal preferences. Please document PO intake as percentage of meals consumed. RD to monitor per dept policy.       "

## 2017-06-17 LAB
ANION GAP SERPL CALC-SCNC: 6 MMOL/L (ref 0–11.9)
BNP SERPL-MCNC: 1633 PG/ML (ref 0–100)
BUN SERPL-MCNC: 19 MG/DL (ref 8–22)
CALCIUM SERPL-MCNC: 9.5 MG/DL (ref 8.5–10.5)
CHLORIDE SERPL-SCNC: 102 MMOL/L (ref 96–112)
CO2 SERPL-SCNC: 28 MMOL/L (ref 20–33)
CREAT SERPL-MCNC: 3.97 MG/DL (ref 0.5–1.4)
ERYTHROCYTE [DISTWIDTH] IN BLOOD BY AUTOMATED COUNT: 57.5 FL (ref 35.9–50)
GFR SERPL CREATININE-BSD FRML MDRD: 11 ML/MIN/1.73 M 2
GLUCOSE SERPL-MCNC: 75 MG/DL (ref 65–99)
HCT VFR BLD AUTO: 26.9 % (ref 37–47)
HGB BLD-MCNC: 8.2 G/DL (ref 12–16)
INR PPP: 1.97 (ref 0.87–1.13)
MCH RBC QN AUTO: 31.4 PG (ref 27–33)
MCHC RBC AUTO-ENTMCNC: 30.5 G/DL (ref 33.6–35)
MCV RBC AUTO: 103.1 FL (ref 81.4–97.8)
PLATELET # BLD AUTO: 122 K/UL (ref 164–446)
PMV BLD AUTO: 11.4 FL (ref 9–12.9)
POTASSIUM SERPL-SCNC: 4.4 MMOL/L (ref 3.6–5.5)
PROTHROMBIN TIME: 23 SEC (ref 12–14.6)
RBC # BLD AUTO: 2.61 M/UL (ref 4.2–5.4)
SODIUM SERPL-SCNC: 136 MMOL/L (ref 135–145)
WBC # BLD AUTO: 4.9 K/UL (ref 4.8–10.8)

## 2017-06-17 PROCEDURE — A9270 NON-COVERED ITEM OR SERVICE: HCPCS | Performed by: HOSPITALIST

## 2017-06-17 PROCEDURE — A9270 NON-COVERED ITEM OR SERVICE: HCPCS

## 2017-06-17 PROCEDURE — 700102 HCHG RX REV CODE 250 W/ 637 OVERRIDE(OP)

## 2017-06-17 PROCEDURE — 99231 SBSQ HOSP IP/OBS SF/LOW 25: CPT | Performed by: HOSPITALIST

## 2017-06-17 PROCEDURE — 700102 HCHG RX REV CODE 250 W/ 637 OVERRIDE(OP): Performed by: NURSE PRACTITIONER

## 2017-06-17 PROCEDURE — 85610 PROTHROMBIN TIME: CPT

## 2017-06-17 PROCEDURE — 700111 HCHG RX REV CODE 636 W/ 250 OVERRIDE (IP): Performed by: NURSE PRACTITIONER

## 2017-06-17 PROCEDURE — 36415 COLL VENOUS BLD VENIPUNCTURE: CPT

## 2017-06-17 PROCEDURE — 700102 HCHG RX REV CODE 250 W/ 637 OVERRIDE(OP): Performed by: INTERNAL MEDICINE

## 2017-06-17 PROCEDURE — 770006 HCHG ROOM/CARE - MED/SURG/GYN SEMI*

## 2017-06-17 PROCEDURE — 700105 HCHG RX REV CODE 258: Performed by: NURSE PRACTITIONER

## 2017-06-17 PROCEDURE — 700102 HCHG RX REV CODE 250 W/ 637 OVERRIDE(OP): Performed by: HOSPITALIST

## 2017-06-17 PROCEDURE — 80048 BASIC METABOLIC PNL TOTAL CA: CPT

## 2017-06-17 PROCEDURE — 85027 COMPLETE CBC AUTOMATED: CPT

## 2017-06-17 PROCEDURE — A9270 NON-COVERED ITEM OR SERVICE: HCPCS | Performed by: INTERNAL MEDICINE

## 2017-06-17 PROCEDURE — 83880 ASSAY OF NATRIURETIC PEPTIDE: CPT

## 2017-06-17 PROCEDURE — A9270 NON-COVERED ITEM OR SERVICE: HCPCS | Performed by: NURSE PRACTITIONER

## 2017-06-17 RX ORDER — WARFARIN SODIUM 1 MG/1
1 TABLET ORAL
Status: DISCONTINUED | OUTPATIENT
Start: 2017-06-17 | End: 2017-06-18

## 2017-06-17 RX ORDER — LOSARTAN POTASSIUM 50 MG/1
25 TABLET ORAL DAILY
Status: DISCONTINUED | OUTPATIENT
Start: 2017-06-18 | End: 2017-06-23 | Stop reason: HOSPADM

## 2017-06-17 RX ORDER — METOPROLOL SUCCINATE 25 MG/1
25 TABLET, EXTENDED RELEASE ORAL DAILY
Status: DISCONTINUED | OUTPATIENT
Start: 2017-06-18 | End: 2017-06-23 | Stop reason: HOSPADM

## 2017-06-17 RX ADMIN — OMEPRAZOLE 20 MG: 20 CAPSULE, DELAYED RELEASE ORAL at 20:20

## 2017-06-17 RX ADMIN — IRON SUCROSE 200 MG: 20 INJECTION, SOLUTION INTRAVENOUS at 12:57

## 2017-06-17 RX ADMIN — SENNOSIDES AND DOCUSATE SODIUM 2 TABLET: 8.6; 5 TABLET ORAL at 20:20

## 2017-06-17 RX ADMIN — ACETAMINOPHEN 650 MG: 325 TABLET, FILM COATED ORAL at 01:17

## 2017-06-17 RX ADMIN — RENAGEL 800 MG: 800 TABLET ORAL at 18:04

## 2017-06-17 RX ADMIN — OMEPRAZOLE 20 MG: 20 CAPSULE, DELAYED RELEASE ORAL at 09:25

## 2017-06-17 RX ADMIN — ACETAMINOPHEN 650 MG: 325 TABLET, FILM COATED ORAL at 20:19

## 2017-06-17 RX ADMIN — RENAGEL 800 MG: 800 TABLET ORAL at 09:26

## 2017-06-17 RX ADMIN — MIDODRINE HYDROCHLORIDE 2.5 MG: 5 TABLET ORAL at 20:19

## 2017-06-17 RX ADMIN — MIDODRINE HYDROCHLORIDE 2.5 MG: 5 TABLET ORAL at 09:26

## 2017-06-17 RX ADMIN — WARFARIN SODIUM 1 MG: 1 TABLET ORAL at 18:05

## 2017-06-17 RX ADMIN — SIMVASTATIN 40 MG: 20 TABLET, FILM COATED ORAL at 20:20

## 2017-06-17 RX ADMIN — SENNOSIDES AND DOCUSATE SODIUM 2 TABLET: 8.6; 5 TABLET ORAL at 09:26

## 2017-06-17 ASSESSMENT — PAIN SCALES - GENERAL
PAINLEVEL_OUTOF10: 4
PAINLEVEL_OUTOF10: 6
PAINLEVEL_OUTOF10: 7
PAINLEVEL_OUTOF10: 5
PAINLEVEL_OUTOF10: 4

## 2017-06-17 ASSESSMENT — ENCOUNTER SYMPTOMS
ORTHOPNEA: 0
WEAKNESS: 1
NAUSEA: 0
BRUISES/BLEEDS EASILY: 0
NECK PAIN: 0
HEARTBURN: 0
DOUBLE VISION: 0
SHORTNESS OF BREATH: 0
HEMOPTYSIS: 0
BACK PAIN: 1
PALPITATIONS: 0
BLURRED VISION: 0
DIZZINESS: 0
EYES NEGATIVE: 1
CHILLS: 0
WEIGHT LOSS: 0
MYALGIAS: 0
HEADACHES: 0
ABDOMINAL PAIN: 0
VOMITING: 0
FEVER: 0
SENSORY CHANGE: 0
DEPRESSION: 0
WHEEZING: 0
BLOOD IN STOOL: 0
FOCAL WEAKNESS: 0
COUGH: 0

## 2017-06-17 ASSESSMENT — LIFESTYLE VARIABLES: DO YOU DRINK ALCOHOL: NO

## 2017-06-17 NOTE — PROGRESS NOTES
Inpatient Anticoagulation Service Note    Date: 6/17/2017  Reason for Anticoagulation: Deep Vein Thrombosis (chronic)  Hemoglobin Value: 8.2  Hematocrit Value: 26.9  Lab Platelet Value: 122  Target INR: 2.0 to 3.0  INR from last 7 days     Date/Time INR Value    06/17/17 0407 (!)1.97    06/16/17 0233 (!)2.29    06/15/17 0011 (!)1.66    06/12/17 0207 (!)1.19    06/11/17 2010 (!)1.24        Dose from last 7 days     Date/Time Dose (mg)    06/17/17 1000 1    06/16/17 1500 0.5    06/15/17 1300 1    06/14/17 1300 4        Significant Interactions: Statin, Proton Pump Inhibitor  Bridge Therapy: No (chronic DVT)    Comments: INR with anticipated decrease after giving 0.5 mg yesterday (50% decrease from previous). Patient appears to be very sensitive to warfarin with significant increase in INR with 4 mg dose. Initially admitted for hematochezia, fecal occult blood negative. H&H stable, on IV iron replacement for anemia associated with HD. DDI as noted above.    Plan:  Increase dose to 1 mg tonight to prevent further decrease to subtherapeutic level. Check INR tomorrow.  Education Material Provided?: No (chronic warfarin pt)  Pharmacist suggested discharge dosing: CRISTIAN Alvarez, PharmD

## 2017-06-17 NOTE — PROGRESS NOTES
Tolerated dialysis well today medicated for knee pain while in dialysis. Appetite improving. Is off and on confused but reorients more easily then yesterday. Granddaughter in to see pt at dinner time states she was at [facility] till Friday came home and then was brought here on the 11th. Wants to talk to doctors and SS tomorrow about discharge plans.

## 2017-06-17 NOTE — PROGRESS NOTES
Received report from day-shift RN. Patient alert and disoriented to time, place and situation. Pt. complaining of 5/10 pain in left knee and hip with movement. Pt. repositioned, given a hot pack, and resting in bed comfortably. Educated pt. about the plan of care and all questions answered at this time. Hourly rounding in place.

## 2017-06-17 NOTE — CARE PLAN
Problem: Communication  Goal: The ability to communicate needs accurately and effectively will improve  Outcome: PROGRESSING AS EXPECTED    Problem: Pain Management  Goal: Pain level will decrease to patient’s comfort goal  Outcome: PROGRESSING AS EXPECTED

## 2017-06-17 NOTE — PROGRESS NOTES
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 No episodes since admission.     Interval Problem Update  6/16 - To HD today, awaiting PT assessment for SNF needs, mild drop in Hgb  6/17 - Hgb trended back up, formed brown stool, no active bleed, seen by PT/OT and recommends SNF    Consultants/Specialty  Nephrology for HD    Dispo:  PT/OT recommends for SNF, will place order for consult       Review of Systems   Constitutional: Positive for malaise/fatigue. Negative for fever, chills and weight loss.        ROS difficult to complete due to mild dementia    HENT: Negative for hearing loss.    Eyes: Negative for blurred vision and double vision.   Cardiovascular: Negative for chest pain and palpitations.   Gastrointestinal: Negative for heartburn, nausea and abdominal pain.   Genitourinary: Negative for dysuria.   Musculoskeletal: Negative for myalgias and neck pain.   Skin: Negative.    Neurological: Positive for weakness. Negative for headaches.   Endo/Heme/Allergies: Does not bruise/bleed easily.   Psychiatric/Behavioral: Negative for depression.      Physical Exam Laboratory/Imaging   Filed Vitals:    06/16/17 1141 06/16/17 1600 06/16/17 2000 06/17/17 0400   BP: 119/55 122/51 128/35 130/35   Pulse: 74 53 55 66   Temp: 35.9 °C (96.7 °F) 36.3 °C (97.3 °F) 36 °C (96.8 °F) 36.1 °C (96.9 °F)   Resp: 18 18 16 16   Height:       Weight:       SpO2:  93% 100% 100%     Physical Exam   Constitutional: She is oriented to person, place, and time. She appears well-developed and well-nourished. No distress.   HENT:   Head: Normocephalic and atraumatic.   Eyes: Pupils are equal, round, and reactive to light.   Neck: Normal range of motion. Neck supple.   Cardiovascular: Normal rate and  regular rhythm.    Pulmonary/Chest: Effort normal and breath sounds normal. No respiratory distress. She has no wheezes. She exhibits no tenderness.   Abdominal: Soft. Bowel sounds are normal. She exhibits no distension. There is no tenderness.   Musculoskeletal: Normal range of motion. She exhibits tenderness.   Left knee tenderness to lateral aspect consistent with effusion     Left leg strength 3/5 passive motion    Neurological: She is alert and oriented to person, place, and time.   Skin: Skin is warm and dry. No rash noted. She is not diaphoretic.   Psychiatric: She has a normal mood and affect.    Lab Results   Component Value Date/Time    WBC 4.9 06/17/2017 04:06 AM    HEMOGLOBIN 8.2* 06/17/2017 04:06 AM    HEMATOCRIT 26.9* 06/17/2017 04:06 AM    PLATELET COUNT 122* 06/17/2017 04:06 AM     Lab Results   Component Value Date/Time    SODIUM 136 06/17/2017 04:06 AM    POTASSIUM 4.4 06/17/2017 04:06 AM    CHLORIDE 102 06/17/2017 04:06 AM    CO2 28 06/17/2017 04:06 AM    GLUCOSE 75 06/17/2017 04:06 AM    BUN 19 06/17/2017 04:06 AM    CREATININE 3.97* 06/17/2017 04:06 AM      Assessment/Plan    Hematochezia (present on admission)  Assessment & Plan  - Stable, no further episodes since admission, last BM brown and formed per bedside RN  - Hgb stable  - Occult stool negative   - Iron replacement    ESRD (end stage renal disease) on dialysis (CMS-[facility]) (present on admission)  Assessment & Plan  - Nephrology following for inpatient HD  - Avoid nephrotoxins, renal dosing    Syncope, near (present on admission)  Assessment & Plan  - Positive orthostatics but resolved with Midodrine. Watch BP for hypertension   - ECHO 3/2016 which was normal    Left knee pain (present on admission)  Assessment & Plan  - Xray shows arthritic changes and moderate effusion  - Consider tap if no improvement with conservative measures  - Continue PT OT, pain control     Dementia (present on admission)  Assessment & Plan  - Pleasantly demented,  appears to be baseline, will check with family    Essential hypertension (present on admission)  Assessment & Plan  - Currently controlled, continue Cozaar, Metoprolol, Hydralazine PRN    Chronic anticoagulation (present on admission)  Assessment & Plan  - Secondary to hx of DVTs- Hgb has been stable - continue Coumadin per pharm        Labs reviewed, Medications reviewed and Radiology images reviewed  Morataya catheter: No Morataya      DVT Prophylaxis: Warfarin (Coumadin)  DVT prophylaxis - mechanical: SCDs  Ulcer prophylaxis: Yes

## 2017-06-17 NOTE — PROGRESS NOTES
Nephrology Progress Note, Adult, Complex               Author: Deonna Fabio Date & Time created: 6/17/2017  1:13 PM     Interval History:  83 y/o female with ESRD/HD admitted with anemia, recta; bleeding, vivian, syncopal episode  Doing better.  Left knee pain -better  BP well controlled    Review of Systems:  Review of Systems   Constitutional: Positive for malaise/fatigue. Negative for fever and chills.   HENT: Negative for congestion and nosebleeds.    Eyes: Negative.    Respiratory: Negative for cough, hemoptysis, shortness of breath and wheezing.    Cardiovascular: Negative for chest pain, palpitations, orthopnea and leg swelling.   Gastrointestinal: Negative for nausea, vomiting, abdominal pain and blood in stool.   Genitourinary: Negative for dysuria.   Musculoskeletal: Positive for back pain and joint pain. Negative for myalgias.   Neurological: Negative for dizziness, sensory change, focal weakness and headaches.       Physical Exam:  Physical Exam   Constitutional: She is oriented to person, place, and time. She appears well-developed. No distress.   Cachectic female   HENT:   Head: Normocephalic and atraumatic.   Mouth/Throat: Oropharynx is clear and moist.   Eyes: Conjunctivae and EOM are normal. Pupils are equal, round, and reactive to light. No scleral icterus.   Neck: Normal range of motion. Neck supple.   Cardiovascular: Normal rate and regular rhythm.  Exam reveals no gallop and no friction rub.    Pulmonary/Chest: Effort normal and breath sounds normal. No respiratory distress. She has no wheezes. She has no rales.   Abdominal: Soft. Bowel sounds are normal. She exhibits no distension. There is no tenderness.   Musculoskeletal: She exhibits no edema.   Neurological: She is alert and oriented to person, place, and time. No cranial nerve deficit.   Nursing note and vitals reviewed.      Labs:        Invalid input(s): [id number]  Recent Labs      06/16/17   0233  06/17/17   0406   BNPBTYPENAT  2210*   1633*     Recent Labs      06/15/17   0011  17   0233  17   0406   SODIUM  133*  135  136   POTASSIUM  4.6  4.6  4.4   CHLORIDE  102  103  102   CO2  27  27  28   BUN  17  26*  19   CREATININE  3.60*  4.76*  3.97*   CALCIUM  9.5  9.6  9.5     Recent Labs      06/15/17   0011  17   0233  17   0406   GLUCOSE  75  73  75     Recent Labs      06/15/17   0011  17   0233  17   0406  17   0407   RBC  2.48*   --   2.61*   --    HEMOGLOBIN  7.8*   --   8.2*   --    HEMATOCRIT  25.4*   --   26.9*   --    PLATELETCT  114*   --   122*   --    PROTHROMBTM  20.1*  25.9*   --   23.0*   INR  1.66*  2.29*   --   1.97*     Recent Labs      06/15/17   0011  17   0406   WBC  4.9  4.9           Hemodynamics:  Temp (24hrs), Av.2 °C (97.1 °F), Min:36 °C (96.8 °F), Max:36.4 °C (97.5 °F)  Temperature: 36.4 °C (97.5 °F)  Pulse  Av.9  Min: 52  Max: 80   Blood Pressure : 124/46 mmHg (RN notified)     Respiratory:    Respiration: 18, Pulse Oximetry: 100 %           Fluids:  No intake or output data in the 24 hours ending 17 1313     GI/Nutrition:  Orders Placed This Encounter   Procedures   • DIET ORDER     Standing Status: Standing      Number of Occurrences: 1      Standing Expiration Date:      Order Specific Question:  Diet:     Answer:  Regular [1]      Comments:  NO RED FOOD     Order Specific Question:  Diet:     Answer:  Renal [8]     Medical Decision Making, by Problem:  Active Hospital Problems    Diagnosis   • Hematochezia [K92.1]   • ESRD (end stage renal disease) on dialysis (CMS-HCC) [N18.6, Z99.2]   • Chronic anticoagulation [Z79.01]   • Essential hypertension [I10]   • Anemia of chronic disease [D63.8]   • Syncope, near [R55]   • Left knee pain [M25.562]       Labs reviewed, Medications reviewed and EKG reviewed                      Assessment and Plan    1.ESRD/HD -MWF  2.HTN: BP well conttrolled  3.Electrolytes: well controlled.  4.Anemia:Hb level - better -to  monitor -Epogen with HD  5.Volume:elevated BNP - to UF with HD as tolerates    Recs; HD MWF             All meds to renal doses

## 2017-06-18 LAB
ANION GAP SERPL CALC-SCNC: 6 MMOL/L (ref 0–11.9)
BUN SERPL-MCNC: 27 MG/DL (ref 8–22)
CALCIUM SERPL-MCNC: 9.6 MG/DL (ref 8.5–10.5)
CHLORIDE SERPL-SCNC: 100 MMOL/L (ref 96–112)
CO2 SERPL-SCNC: 29 MMOL/L (ref 20–33)
CREAT SERPL-MCNC: 5.24 MG/DL (ref 0.5–1.4)
ERYTHROCYTE [DISTWIDTH] IN BLOOD BY AUTOMATED COUNT: 57.8 FL (ref 35.9–50)
GFR SERPL CREATININE-BSD FRML MDRD: 8 ML/MIN/1.73 M 2
GLUCOSE SERPL-MCNC: 69 MG/DL (ref 65–99)
HCT VFR BLD AUTO: 25.7 % (ref 37–47)
HGB BLD-MCNC: 8 G/DL (ref 12–16)
INR PPP: 1.89 (ref 0.87–1.13)
MCH RBC QN AUTO: 32.1 PG (ref 27–33)
MCHC RBC AUTO-ENTMCNC: 31.1 G/DL (ref 33.6–35)
MCV RBC AUTO: 103.2 FL (ref 81.4–97.8)
PLATELET # BLD AUTO: 123 K/UL (ref 164–446)
PMV BLD AUTO: 11.3 FL (ref 9–12.9)
POTASSIUM SERPL-SCNC: 4.6 MMOL/L (ref 3.6–5.5)
PROTHROMBIN TIME: 22.3 SEC (ref 12–14.6)
RBC # BLD AUTO: 2.49 M/UL (ref 4.2–5.4)
SODIUM SERPL-SCNC: 135 MMOL/L (ref 135–145)
WBC # BLD AUTO: 4.6 K/UL (ref 4.8–10.8)

## 2017-06-18 PROCEDURE — A9270 NON-COVERED ITEM OR SERVICE: HCPCS | Performed by: INTERNAL MEDICINE

## 2017-06-18 PROCEDURE — 85027 COMPLETE CBC AUTOMATED: CPT

## 2017-06-18 PROCEDURE — A9270 NON-COVERED ITEM OR SERVICE: HCPCS

## 2017-06-18 PROCEDURE — 770006 HCHG ROOM/CARE - MED/SURG/GYN SEMI*

## 2017-06-18 PROCEDURE — 80048 BASIC METABOLIC PNL TOTAL CA: CPT

## 2017-06-18 PROCEDURE — 700102 HCHG RX REV CODE 250 W/ 637 OVERRIDE(OP): Performed by: HOSPITALIST

## 2017-06-18 PROCEDURE — 700102 HCHG RX REV CODE 250 W/ 637 OVERRIDE(OP): Performed by: INTERNAL MEDICINE

## 2017-06-18 PROCEDURE — A9270 NON-COVERED ITEM OR SERVICE: HCPCS | Performed by: NURSE PRACTITIONER

## 2017-06-18 PROCEDURE — 99231 SBSQ HOSP IP/OBS SF/LOW 25: CPT | Performed by: HOSPITALIST

## 2017-06-18 PROCEDURE — A9270 NON-COVERED ITEM OR SERVICE: HCPCS | Performed by: HOSPITALIST

## 2017-06-18 PROCEDURE — 700102 HCHG RX REV CODE 250 W/ 637 OVERRIDE(OP): Performed by: NURSE PRACTITIONER

## 2017-06-18 PROCEDURE — 36415 COLL VENOUS BLD VENIPUNCTURE: CPT

## 2017-06-18 PROCEDURE — 700102 HCHG RX REV CODE 250 W/ 637 OVERRIDE(OP)

## 2017-06-18 PROCEDURE — 85610 PROTHROMBIN TIME: CPT

## 2017-06-18 RX ORDER — WARFARIN SODIUM 2 MG/1
2 TABLET ORAL
Status: DISCONTINUED | OUTPATIENT
Start: 2017-06-18 | End: 2017-06-19

## 2017-06-18 RX ADMIN — MIDODRINE HYDROCHLORIDE 2.5 MG: 5 TABLET ORAL at 19:42

## 2017-06-18 RX ADMIN — SENNOSIDES AND DOCUSATE SODIUM 2 TABLET: 8.6; 5 TABLET ORAL at 09:24

## 2017-06-18 RX ADMIN — OMEPRAZOLE 20 MG: 20 CAPSULE, DELAYED RELEASE ORAL at 09:25

## 2017-06-18 RX ADMIN — SIMVASTATIN 40 MG: 20 TABLET, FILM COATED ORAL at 19:42

## 2017-06-18 RX ADMIN — RENAGEL 800 MG: 800 TABLET ORAL at 09:25

## 2017-06-18 RX ADMIN — OMEPRAZOLE 20 MG: 20 CAPSULE, DELAYED RELEASE ORAL at 19:42

## 2017-06-18 RX ADMIN — MIDODRINE HYDROCHLORIDE 2.5 MG: 5 TABLET ORAL at 09:25

## 2017-06-18 RX ADMIN — SENNOSIDES AND DOCUSATE SODIUM 2 TABLET: 8.6; 5 TABLET ORAL at 19:42

## 2017-06-18 RX ADMIN — WARFARIN SODIUM 2 MG: 2 TABLET ORAL at 19:43

## 2017-06-18 RX ADMIN — ACETAMINOPHEN 650 MG: 325 TABLET, FILM COATED ORAL at 19:43

## 2017-06-18 RX ADMIN — RENAGEL 800 MG: 800 TABLET ORAL at 18:27

## 2017-06-18 ASSESSMENT — ENCOUNTER SYMPTOMS
PALPITATIONS: 0
FEVER: 0
MYALGIAS: 0
DOUBLE VISION: 0
ORTHOPNEA: 0
DEPRESSION: 0
SENSORY CHANGE: 0
BRUISES/BLEEDS EASILY: 0
WHEEZING: 0
CHILLS: 0
NECK PAIN: 0
EYES NEGATIVE: 1
SHORTNESS OF BREATH: 0
COUGH: 0
WEIGHT LOSS: 0
ABDOMINAL PAIN: 0
HEARTBURN: 0
HEMOPTYSIS: 0
HEADACHES: 0
BLOOD IN STOOL: 0
NAUSEA: 0
BLURRED VISION: 0
WEAKNESS: 1
FOCAL WEAKNESS: 0
VOMITING: 0
BACK PAIN: 1
DIZZINESS: 0

## 2017-06-18 ASSESSMENT — PAIN SCALES - GENERAL
PAINLEVEL_OUTOF10: 4
PAINLEVEL_OUTOF10: 0
PAINLEVEL_OUTOF10: 6

## 2017-06-18 NOTE — PROGRESS NOTES
Hospital Medicine Interval Note  Date of Service:  6/18/2017    Chief Complaint  82 y.o.-year-old female admitted 6/11/2017 with multiple medical issues including ESDR on HD, prior DVTs on chronic anticoagulation who presented when her family members noticed dark stool concerning for blood. No episodes since admission.     Interval Problem Update  6/16 - To HD today, awaiting PT assessment for SNF needs, mild drop in Hgb  6/17 - Hgb trended back up, formed brown stool, no active bleed, seen by PT/OT and recommends SNF  6/18 - No acute events, comfortable and awaiting SNF, her expectations of recovery are somewhat unrealistic, thinks she will be able to shovel snow    Consultants/Specialty  Nephrology for HD    Dispo:  PT/OT recommends for SNF, will place order for consult       Review of Systems   Constitutional: Positive for malaise/fatigue. Negative for fever, chills and weight loss.        ROS difficult to complete due to mild dementia    HENT: Negative for hearing loss.    Eyes: Negative for blurred vision and double vision.   Cardiovascular: Negative for chest pain and palpitations.   Gastrointestinal: Negative for heartburn, nausea and abdominal pain.   Genitourinary: Negative for dysuria.   Musculoskeletal: Negative for myalgias and neck pain.   Skin: Negative.    Neurological: Positive for weakness. Negative for headaches.   Endo/Heme/Allergies: Does not bruise/bleed easily.   Psychiatric/Behavioral: Negative for depression.      Physical Exam Laboratory/Imaging   Filed Vitals:    06/17/17 1625 06/17/17 2000 06/18/17 0400 06/18/17 0731   BP: 139/49 147/44 122/41 136/39   Pulse: 78 71 58 59   Temp: 37 °C (98.6 °F) 36.9 °C (98.5 °F) 36.6 °C (97.9 °F) 36.5 °C (97.7 °F)   Resp: 18 17 17 17   Height:       Weight:       SpO2: 95% 100% 100% 98%     Physical Exam   Constitutional: She is oriented to person, place, and time. She appears well-developed and well-nourished. No distress.   HENT:   Head: Normocephalic  and atraumatic.   Eyes: Pupils are equal, round, and reactive to light.   Neck: Normal range of motion. Neck supple.   Cardiovascular: Normal rate and regular rhythm.    Pulmonary/Chest: Effort normal and breath sounds normal. No respiratory distress. She has no wheezes. She exhibits no tenderness.   Abdominal: Soft. Bowel sounds are normal. She exhibits no distension. There is no tenderness.   Musculoskeletal: Normal range of motion. She exhibits tenderness.   Left knee tenderness to lateral aspect consistent with effusion     Left leg strength 3/5 passive motion    Neurological: She is alert and oriented to person, place, and time.   Skin: Skin is warm and dry. No rash noted. She is not diaphoretic.   Psychiatric: She has a normal mood and affect.    Lab Results   Component Value Date/Time    WBC 4.6* 06/18/2017 04:39 AM    HEMOGLOBIN 8.0* 06/18/2017 04:39 AM    HEMATOCRIT 25.7* 06/18/2017 04:39 AM    PLATELET COUNT 123* 06/18/2017 04:39 AM     Lab Results   Component Value Date/Time    SODIUM 135 06/18/2017 04:39 AM    POTASSIUM 4.6 06/18/2017 04:39 AM    CHLORIDE 100 06/18/2017 04:39 AM    CO2 29 06/18/2017 04:39 AM    GLUCOSE 69 06/18/2017 04:39 AM    BUN 27* 06/18/2017 04:39 AM    CREATININE 5.24* 06/18/2017 04:39 AM      Assessment/Plan    Hematochezia (present on admission)  Assessment & Plan  - Stable, no further episodes since admission, last BM brown and formed per bedside RN  - Hgb stable  - Occult stool negative   - Iron replacement    ESRD (end stage renal disease) on dialysis (CMS-[facility]) (present on admission)  Assessment & Plan  - Nephrology following for inpatient HD  - Avoid nephrotoxins, renal dosing    Syncope, near (present on admission)  Assessment & Plan  - Positive orthostatics but resolved with Midodrine. Watch BP for hypertension   - ECHO 3/2016 which was normal    Left knee pain (present on admission)  Assessment & Plan  - Xray shows arthritic changes and moderate effusion  - Consider tap if  no improvement with conservative measures  - Continue PT OT, pain control     Dementia (present on admission)  Assessment & Plan  - Pleasantly demented, appears to be baseline, will check with family    Essential hypertension (present on admission)  Assessment & Plan  - Currently controlled, continue Cozaar, Metoprolol, Hydralazine PRN    Chronic anticoagulation (present on admission)  Assessment & Plan  - Secondary to hx of DVTs- Hgb has been stable - continue Coumadin per pharm        Labs reviewed, Medications reviewed and Radiology images reviewed  Morataya catheter: No Morataya      DVT Prophylaxis: Warfarin (Coumadin)  DVT prophylaxis - mechanical: SCDs  Ulcer prophylaxis: Yes

## 2017-06-18 NOTE — PROGRESS NOTES
Received report from day-shift. Patient alert and disoriented to time, place, situation. Pt. complaining of itchiness to her back., moisturizer applied and pt. states the itching has subsided. Pt. assisted to edge of bed and only tolerated for 1-2 minutes r/t pain to hips/back/knees. Pt educated about the plan of care w/ all questions/concerns answered at this time. Call light within reach. Hourly rounding in place.

## 2017-06-18 NOTE — DISCHARGE PLANNING
Met with pt regarding SNF choice. Pt is agreeable to d/c to [facility]. Faxed choice to [facility]. Pt has a granddaughter whom she hopes will be able to take care of her at home. She is optimistic about her abilities and did not believe she needed to be hospitalized when she was brought to [facility] one week ago.     Awaiting accepted to SNF for d/c.

## 2017-06-18 NOTE — PROGRESS NOTES
Nephrology Progress Note, Adult, Complex               Author: Deonna Fabio Date & Time created: 6/18/2017  11:49 AM     Interval History:  81 y/o female with ESRD/HD admitted with anemia, recta; bleeding, vivian, syncopal episode  Doing better.  Left knee pain -better  BP well controlled    Review of Systems:  Review of Systems   Constitutional: Positive for malaise/fatigue. Negative for fever and chills.   HENT: Negative for congestion and nosebleeds.    Eyes: Negative.    Respiratory: Negative for cough, hemoptysis, shortness of breath and wheezing.    Cardiovascular: Negative for chest pain, palpitations, orthopnea and leg swelling.   Gastrointestinal: Negative for nausea, vomiting, abdominal pain and blood in stool.   Genitourinary: Negative for dysuria.   Musculoskeletal: Positive for back pain and joint pain. Negative for myalgias.   Neurological: Negative for dizziness, sensory change, focal weakness and headaches.       Physical Exam:  Physical Exam   Constitutional: She is oriented to person, place, and time. She appears well-developed. No distress.   Cachectic female   HENT:   Head: Normocephalic and atraumatic.   Mouth/Throat: Oropharynx is clear and moist.   Eyes: Conjunctivae and EOM are normal. Pupils are equal, round, and reactive to light. No scleral icterus.   Neck: Normal range of motion. Neck supple.   Cardiovascular: Normal rate and regular rhythm.  Exam reveals no gallop and no friction rub.    Pulmonary/Chest: Effort normal and breath sounds normal. No respiratory distress. She has no wheezes. She has no rales.   Abdominal: Soft. Bowel sounds are normal. She exhibits no distension. There is no tenderness.   Musculoskeletal: She exhibits no edema.   Neurological: She is alert and oriented to person, place, and time. No cranial nerve deficit.   Nursing note and vitals reviewed.      Labs:        Invalid input(s): [id number]  Recent Labs      06/16/17   0233  06/17/17   0406   BNPBTYPENAT   2210*  1633*     Recent Labs      17   0233  17   0406  17   0439   SODIUM  135  136  135   POTASSIUM  4.6  4.4  4.6   CHLORIDE  103  102  100   CO2  27  28  29   BUN  26*  19  27*   CREATININE  4.76*  3.97*  5.24*   CALCIUM  9.6  9.5  9.6     Recent Labs      17   0233  17   0406  17   0439   GLUCOSE  73  75  69     Recent Labs      17   0233  17   0406  17   0407  17   0439   RBC   --   2.61*   --   2.49*   HEMOGLOBIN   --   8.2*   --   8.0*   HEMATOCRIT   --   26.9*   --   25.7*   PLATELETCT   --   122*   --   123*   PROTHROMBTM  25.9*   --   23.0*  22.3*   INR  2.29*   --   1.97*  1.89*     Recent Labs      17   0406  17   0439   WBC  4.9  4.6*           Hemodynamics:  Temp (24hrs), Av.8 °C (98.2 °F), Min:36.5 °C (97.7 °F), Max:37 °C (98.6 °F)  Temperature: 36.5 °C (97.7 °F)  Pulse  Av.3  Min: 52  Max: 80   Blood Pressure : 136/39 mmHg     Respiratory:    Respiration: 17, Pulse Oximetry: 98 %           Fluids:  No intake or output data in the 24 hours ending 17 1149     GI/Nutrition:  Orders Placed This Encounter   Procedures   • DIET ORDER     Standing Status: Standing      Number of Occurrences: 1      Standing Expiration Date:      Order Specific Question:  Diet:     Answer:  Regular [1]      Comments:  NO RED FOOD     Order Specific Question:  Diet:     Answer:  Renal [8]     Medical Decision Making, by Problem:  Active Hospital Problems    Diagnosis   • Hematochezia [K92.1]   • ESRD (end stage renal disease) on dialysis (CMS-HCC) [N18.6, Z99.2]   • Chronic anticoagulation [Z79.01]   • Essential hypertension [I10]   • Anemia of chronic disease [D63.8]   • Syncope, near [R55]   • Left knee pain [M25.562]       Labs reviewed, Medications reviewed and EKG reviewed                      Assessment and Plan    1.ESRD/HD -MWF  2.HTN: BP well conttrolled  3.Electrolytes: well controlled.  4.Anemia:Hb level - better -to monitor  -Epogen with HD  5.Volume:elevated BNP - to UF with HD as tolerates    Recs; HD MWF             All meds to renal doses             Diet: renal

## 2017-06-18 NOTE — CARE PLAN
Problem: Safety  Goal: Will remain free from falls  Intervention: Assess risk factors for falls   Pt confused (GCS 14+) and weak with right knee pain, therefor pt educated on use of call light and will use bedpan pan when in need to use restroom. Hourly rounding in place.

## 2017-06-18 NOTE — PROGRESS NOTES
0715:  Pt A/O x 3, pleasantly confused. Denies any stools since admit. States she has pain to left knee when she naga to walk. PT consult ordered and pending. Bed low and locked, call light in reach with hourly rounding in place.     1130: Plan is to discharge pt to SNF. [facility] accepted per CM note, pending insurance auth.

## 2017-06-18 NOTE — PROGRESS NOTES
Inpatient Anticoagulation Service Note    Date: 6/18/2017  Reason for Anticoagulation: Deep Vein Thrombosis (chronic)  Hemoglobin Value: 8  Hematocrit Value: 25.7  Lab Platelet Value: 123  Target INR: 2.0 to 3.0  INR from last 7 days     Date/Time INR Value    06/18/17 0439 (!)1.89    06/17/17 0407 (!)1.97    06/16/17 0233 (!)2.29    06/15/17 0011 (!)1.66    06/12/17 0207 (!)1.19    06/11/17 2010 (!)1.24        Dose from last 7 days     Date/Time Dose (mg)    06/18/17 1200 2    06/17/17 1000 1    06/16/17 1500 0.5    06/15/17 1300 1    06/14/17 1300 4        Significant Interactions: Statin, Proton Pump Inhibitor  Bridge Therapy: No    Comments: INR decreased from yesterday, remains subtherapeutic. H&H stable, no overt s/sx of bleeding. No change in DDI. Use caution in increasing dose further as appears to be very sensitive to warfarin as seen with significant increase in INR after 4 mg dose. Decrease in today's INR likely result of 0.5 mg dose two days ago.    Plan: Will increase dose to 2 mg daily and check INR tomorrow.   Education Material Provided?: No (chronic warfarin pt)  Pharmacist suggested discharge dosing: possibly 2 mg daily, INR check within 48 hours of discharge     Yonathan Alvarez, PharmD

## 2017-06-19 LAB
ANION GAP SERPL CALC-SCNC: 3 MMOL/L (ref 0–11.9)
BUN SERPL-MCNC: 32 MG/DL (ref 8–22)
CALCIUM SERPL-MCNC: 9.3 MG/DL (ref 8.5–10.5)
CHLORIDE SERPL-SCNC: 99 MMOL/L (ref 96–112)
CO2 SERPL-SCNC: 29 MMOL/L (ref 20–33)
CREAT SERPL-MCNC: 5.99 MG/DL (ref 0.5–1.4)
ERYTHROCYTE [DISTWIDTH] IN BLOOD BY AUTOMATED COUNT: 55.4 FL (ref 35.9–50)
GFR SERPL CREATININE-BSD FRML MDRD: 7 ML/MIN/1.73 M 2
GLUCOSE SERPL-MCNC: 79 MG/DL (ref 65–99)
HCT VFR BLD AUTO: 24 % (ref 37–47)
HGB BLD-MCNC: 7.4 G/DL (ref 12–16)
INR PPP: 1.88 (ref 0.87–1.13)
MCH RBC QN AUTO: 31.2 PG (ref 27–33)
MCHC RBC AUTO-ENTMCNC: 30.8 G/DL (ref 33.6–35)
MCV RBC AUTO: 101.3 FL (ref 81.4–97.8)
PLATELET # BLD AUTO: 131 K/UL (ref 164–446)
PMV BLD AUTO: 11.6 FL (ref 9–12.9)
POTASSIUM SERPL-SCNC: 4.8 MMOL/L (ref 3.6–5.5)
PROTHROMBIN TIME: 22.2 SEC (ref 12–14.6)
RBC # BLD AUTO: 2.37 M/UL (ref 4.2–5.4)
SODIUM SERPL-SCNC: 131 MMOL/L (ref 135–145)
WBC # BLD AUTO: 5.1 K/UL (ref 4.8–10.8)

## 2017-06-19 PROCEDURE — 85610 PROTHROMBIN TIME: CPT

## 2017-06-19 PROCEDURE — 99233 SBSQ HOSP IP/OBS HIGH 50: CPT | Performed by: HOSPITALIST

## 2017-06-19 PROCEDURE — 770006 HCHG ROOM/CARE - MED/SURG/GYN SEMI*

## 2017-06-19 PROCEDURE — 700102 HCHG RX REV CODE 250 W/ 637 OVERRIDE(OP): Performed by: NURSE PRACTITIONER

## 2017-06-19 PROCEDURE — A9270 NON-COVERED ITEM OR SERVICE: HCPCS | Performed by: INTERNAL MEDICINE

## 2017-06-19 PROCEDURE — 700102 HCHG RX REV CODE 250 W/ 637 OVERRIDE(OP): Performed by: HOSPITALIST

## 2017-06-19 PROCEDURE — 80048 BASIC METABOLIC PNL TOTAL CA: CPT

## 2017-06-19 PROCEDURE — 700102 HCHG RX REV CODE 250 W/ 637 OVERRIDE(OP): Performed by: INTERNAL MEDICINE

## 2017-06-19 PROCEDURE — 85027 COMPLETE CBC AUTOMATED: CPT

## 2017-06-19 PROCEDURE — A9270 NON-COVERED ITEM OR SERVICE: HCPCS | Performed by: HOSPITALIST

## 2017-06-19 PROCEDURE — 90935 HEMODIALYSIS ONE EVALUATION: CPT

## 2017-06-19 PROCEDURE — A9270 NON-COVERED ITEM OR SERVICE: HCPCS | Performed by: NURSE PRACTITIONER

## 2017-06-19 PROCEDURE — 36415 COLL VENOUS BLD VENIPUNCTURE: CPT

## 2017-06-19 RX ORDER — WARFARIN SODIUM 2 MG/1
2 TABLET ORAL
Status: DISCONTINUED | OUTPATIENT
Start: 2017-06-20 | End: 2017-06-19

## 2017-06-19 RX ORDER — L. ACIDOPHILUS/L.BULGARICUS 100MM CELL
1 GRANULES IN PACKET (EA) ORAL
Status: DISCONTINUED | OUTPATIENT
Start: 2017-06-19 | End: 2017-06-22

## 2017-06-19 RX ORDER — ACETAMINOPHEN 325 MG/1
650 TABLET ORAL EVERY 4 HOURS PRN
Status: DISCONTINUED | OUTPATIENT
Start: 2017-06-19 | End: 2017-06-23 | Stop reason: HOSPADM

## 2017-06-19 RX ORDER — FERROUS SULFATE 325(65) MG
325 TABLET ORAL
Status: DISCONTINUED | OUTPATIENT
Start: 2017-06-20 | End: 2017-06-23 | Stop reason: HOSPADM

## 2017-06-19 RX ORDER — WARFARIN SODIUM 2.5 MG/1
2.5 TABLET ORAL
Status: DISCONTINUED | OUTPATIENT
Start: 2017-06-19 | End: 2017-06-19

## 2017-06-19 RX ADMIN — LOSARTAN POTASSIUM 25 MG: 50 TABLET, FILM COATED ORAL at 07:54

## 2017-06-19 RX ADMIN — LACTOBACILLUS ACIDOPHILUS / LACTOBACILLUS BULGARICUS 1 PACKET: 100 MILLION CFU STRENGTH GRANULES at 17:20

## 2017-06-19 RX ADMIN — RENAGEL 800 MG: 800 TABLET ORAL at 17:17

## 2017-06-19 RX ADMIN — SIMVASTATIN 40 MG: 20 TABLET, FILM COATED ORAL at 20:53

## 2017-06-19 RX ADMIN — RENAGEL 800 MG: 800 TABLET ORAL at 07:54

## 2017-06-19 RX ADMIN — SENNOSIDES AND DOCUSATE SODIUM 2 TABLET: 8.6; 5 TABLET ORAL at 20:53

## 2017-06-19 RX ADMIN — MIDODRINE HYDROCHLORIDE 2.5 MG: 5 TABLET ORAL at 07:53

## 2017-06-19 RX ADMIN — ACETAMINOPHEN 650 MG: 325 TABLET, FILM COATED ORAL at 12:26

## 2017-06-19 RX ADMIN — SENNOSIDES AND DOCUSATE SODIUM 2 TABLET: 8.6; 5 TABLET ORAL at 07:53

## 2017-06-19 RX ADMIN — OMEPRAZOLE 20 MG: 20 CAPSULE, DELAYED RELEASE ORAL at 20:53

## 2017-06-19 RX ADMIN — OMEPRAZOLE 20 MG: 20 CAPSULE, DELAYED RELEASE ORAL at 07:54

## 2017-06-19 ASSESSMENT — ENCOUNTER SYMPTOMS
FEVER: 0
VOMITING: 0
MYALGIAS: 0
HEADACHES: 0
WHEEZING: 0
ABDOMINAL PAIN: 0
CHILLS: 0
BACK PAIN: 0
FOCAL WEAKNESS: 0
DIARRHEA: 0
BLOOD IN STOOL: 0
DIZZINESS: 0
PALPITATIONS: 0
SHORTNESS OF BREATH: 0
HEMOPTYSIS: 0
ORTHOPNEA: 0
SENSORY CHANGE: 0
NAUSEA: 0
COUGH: 0

## 2017-06-19 ASSESSMENT — PAIN SCALES - GENERAL: PAINLEVEL_OUTOF10: 0

## 2017-06-19 NOTE — PROGRESS NOTES
Received report from day-shift nurse. Pt. Resting in bed comfortably w/o any signs of respiratory distress noted at this time. Pt. complaining of pain in right knee, hot pack provided along with massaging the knee with moisturizer. Pt. repositioned for comfort with Q2H turns in place. Educated pt. about the plan of care with all questions/concerns answered at this time. Bed alarm on. Call light in reach. Will continue to monitor.

## 2017-06-19 NOTE — CARE PLAN
Problem: Safety  Goal: Will remain free from injury  Outcome: PROGRESSING AS EXPECTED  Strip alarm in place, frequent checks    Problem: Skin Integrity  Goal: Risk for impaired skin integrity will decrease  Outcome: PROGRESSING AS EXPECTED  Frequent repositioning and incont care

## 2017-06-19 NOTE — PROGRESS NOTES
Hemodialysis ordered by Dr. Najjar. Treatment started at 0822 and ended at 1122. Pt stable, vss, no c/o post tx. See flow sheets for details. Net UF 2.5 liters. Report to SONAL Ortega RN. Rt ua avf dressing clean, dry, intact.

## 2017-06-19 NOTE — PROGRESS NOTES
Nephrology Progress Note, Adult, Complex               Author:Fadi Najjar Date & Time created: 6/19/2017  12:59 PM     Interval History:  81 y/o female with ESRD/HD admitted with anemia, recta; bleeding, vivian, syncopal episode  Doing better.  Left knee pain -better  BP well controlled  Seen and examined while getting HD.    Review of Systems:  Review of Systems   Constitutional: Positive for malaise/fatigue. Negative for fever and chills.   HENT: Negative for congestion and nosebleeds.    Respiratory: Negative for cough, hemoptysis, shortness of breath and wheezing.    Cardiovascular: Negative for chest pain, palpitations, orthopnea and leg swelling.   Gastrointestinal: Negative for nausea, vomiting, abdominal pain and blood in stool.   Genitourinary: Negative for dysuria.   Musculoskeletal: Negative for myalgias, back pain and joint pain.   Neurological: Negative for dizziness, sensory change, focal weakness and headaches.       Physical Exam:  Physical Exam   Constitutional: She is oriented to person, place, and time. She appears well-developed. No distress.   Cachectic female   HENT:   Head: Normocephalic and atraumatic.   Mouth/Throat: Oropharynx is clear and moist.   Eyes: Conjunctivae and EOM are normal. Pupils are equal, round, and reactive to light. No scleral icterus.   Neck: Normal range of motion. Neck supple.   Cardiovascular: Normal rate and regular rhythm.  Exam reveals no gallop and no friction rub.    Pulmonary/Chest: Effort normal and breath sounds normal. No respiratory distress. She has no wheezes. She has no rales.   Abdominal: Soft. Bowel sounds are normal. She exhibits no distension. There is no tenderness.   Musculoskeletal: She exhibits no edema.   Neurological: She is alert and oriented to person, place, and time. No cranial nerve deficit.   Nursing note and vitals reviewed.      Labs:        Invalid input(s): [id number]  Recent Labs      06/17/17   0406   BNPBTYPENAT  1633*     Recent  Labs      17   0406  17   0439  17   0219   SODIUM  136  135  131*   POTASSIUM  4.4  4.6  4.8   CHLORIDE  102  100  99   CO2  28  29  29   BUN  19  27*  32*   CREATININE  3.97*  5.24*  5.99*   CALCIUM  9.5  9.6  9.3     Recent Labs      17   0406  17   0439  17   0219   GLUCOSE  75  69  79     Recent Labs      17   0406  17   0407  17   0439  17   0219   RBC  2.61*   --   2.49*  2.37*   HEMOGLOBIN  8.2*   --   8.0*  7.4*   HEMATOCRIT  26.9*   --   25.7*  24.0*   PLATELETCT  122*   --   123*  131*   PROTHROMBTM   --   23.0*  22.3*  22.2*   INR   --   1.97*  1.89*  1.88*     Recent Labs      17   0406  17   04317   0219   WBC  4.9  4.6*  5.1           Hemodynamics:  Temp (24hrs), Av.8 °C (98.2 °F), Min:36.2 °C (97.2 °F), Max:37.2 °C (99 °F)  Temperature: 37 °C (98.6 °F), Monitored Temp:  (rn notified)  Pulse  Av.5  Min: 52  Max: 80   Blood Pressure : 118/41 mmHg     Respiratory:    Respiration: 18, Pulse Oximetry: 100 %           Fluids:  No intake or output data in the 24 hours ending 17 1259     GI/Nutrition:  Orders Placed This Encounter   Procedures   • DIET ORDER     Standing Status: Standing      Number of Occurrences: 1      Standing Expiration Date:      Order Specific Question:  Diet:     Answer:  Regular [1]      Comments:  NO RED FOOD     Order Specific Question:  Diet:     Answer:  Renal [8]     Medical Decision Making, by Problem:  Active Hospital Problems    Diagnosis   • Hematochezia [K92.1]   • ESRD (end stage renal disease) on dialysis (CMS-HCC) [N18.6, Z99.2]   • Chronic anticoagulation [Z79.01]   • Essential hypertension [I10]   • Anemia of chronic disease [D63.8]   • Syncope, near [R55]   • Left knee pain [M25.562]       Labs reviewed, Medications reviewed and EKG reviewed                      Assessment and Plan    1.ESRD/HD -MWF  2.HTN: BP well conttrolled  3.Electrolytes: well  controlled.  4.Anemia:Epogen with HD  5.Volume:elevated BNP - to UF with HD as tolerates    Recs:   HD MWF   All meds to renal doses   Diet: renal

## 2017-06-19 NOTE — PROGRESS NOTES
[facility]    Date of Service: 2017    Chief Complaint  82 y.o. female admitted 2017 with presyncope and BRBPR.    Interval Problem Update  Pt is feeling Ok.  No GI has seen and evaluated the patient.  Denies recurrent GIB.    Consultants/Specialty  Neph.  GI pending.    Disposition          Review of Systems   Constitutional: Negative for fever and chills.   Respiratory: Negative for shortness of breath.    Cardiovascular: Negative for chest pain.   Gastrointestinal: Negative for abdominal pain, diarrhea and blood in stool.   All other systems reviewed and are negative.     Physical Exam  Laboratory/Imaging   Hemodynamics  Temp (24hrs), Av.6 °C (97.8 °F), Min:35.9 °C (96.6 °F), Max:37.2 °C (99 °F)   Temperature: 37 °C (98.6 °F), Monitored Temp:  (rn notified)  Pulse  Av.5  Min: 52  Max: 80    Blood Pressure : 118/41 mmHg      Respiratory      Respiration: 18, Pulse Oximetry: 100 %             Fluids    Intake/Output Summary (Last 24 hours) at 17 1654  Last data filed at 17 1122   Gross per 24 hour   Intake    500 ml   Output   3000 ml   Net  -2500 ml       Nutrition  Orders Placed This Encounter   Procedures   • DIET ORDER     Standing Status: Standing      Number of Occurrences: 1      Standing Expiration Date:      Order Specific Question:  Diet:     Answer:  Renal [8]      Comments:  NO RED FOOD     Order Specific Question:  Diet:     Answer:  Clear Liquids - No Red Foods [12]     Physical Exam  Nursing note and vitals reviewed.  Constitutional: She is oriented to person, place, and time. She appears well-developed and well-nourished.   HENT:   Head: Normocephalic and atraumatic.   Right Ear: External ear normal.   Left Ear: External ear normal.   Nose: Nose normal.   Mouth/Throat: Oropharynx is small with Mallanpati score of 2-3.  Mucosa is clear and moist.   Eyes: Conjunctivae and extraocular motions are normal. Pupils are equal, round, and reactive to  light.   Neck: Normal range of motion. Neck supple.   Cardiovascular: Normal rate, regular rhythm, normal heart sounds and intact distal pulses.    Pulmonary/Chest: Effort normal and breath sounds normal.   Abdominal: Soft. Bowel sounds are normal.   Musculoskeletal: Normal range of motion.   Neurological: She is alert and oriented to person, place, and time.   Skin: Skin is warm and dry.       Recent Labs      06/17/17   0406  06/18/17   0439  06/19/17   0219   WBC  4.9  4.6*  5.1   RBC  2.61*  2.49*  2.37*   HEMOGLOBIN  8.2*  8.0*  7.4*   HEMATOCRIT  26.9*  25.7*  24.0*   MCV  103.1*  103.2*  101.3*   MCH  31.4  32.1  31.2   MCHC  30.5*  31.1*  30.8*   RDW  57.5*  57.8*  55.4*   PLATELETCT  122*  123*  131*   MPV  11.4  11.3  11.6     Recent Labs      06/17/17   0406  06/18/17 0439  06/19/17   0219   SODIUM  136  135  131*   POTASSIUM  4.4  4.6  4.8   CHLORIDE  102  100  99   CO2  28  29  29   GLUCOSE  75  69  79   BUN  19  27*  32*   CREATININE  3.97*  5.24*  5.99*   CALCIUM  9.5  9.6  9.3     Recent Labs      06/17/17   0407  06/18/17 0439  06/19/17   0219   INR  1.97*  1.89*  1.88*     Recent Labs      06/17/17 0406   BNPBTYPENAT  1633*              Assessment/Plan     Hematochezia (present on admission)  Assessment & Plan  Hgb stable and FOB neg.  Cont Fe replacement and consult GI.      ESRD (end stage renal disease) on dialysis (CMS-[facility]) (present on admission)  Assessment & Plan  HD per neph.      Syncope, near (present on admission)  Assessment & Plan  Positive orthostatics but resolved with Midodrine but now hypertensive.  DC Midodrine and follow.      Left knee pain (present on admission)  Assessment & Plan  Xray shows arthritic changes and moderate effusion.  Consider tap if no improvement with conservative measures.  Ice pack PRN to knee.      Dementia (present on admission)  Assessment & Plan  Pleasantly demented, appears to be baseline.  Min narc/sed when possible.    Essential hypertension  (present on admission)  Assessment & Plan  - Currently controlled, continue Cozaar, Metoprolol, Hydralazine PRN    Chronic anticoagulation (present on admission)  Assessment & Plan  Continue Coumadin per pharm     EKG reviewed, Medications reviewed, Radiology images reviewed and Labs reviewed  [place] catheter: No [place]      DVT Prophylaxis: Warfarin (Coumadin)    Ulcer prophylaxis: Not indicated    Assessed for rehab: Patient was assess for and/or received rehabilitation services during this hospitalization      Other - DC daily labs.    Pulm HTN - outpatient follow up.    Stable issues - med hx (DVT, angina, glaucoma, hypothyroid, HLD, recent influenza infection),    Preventives - IS, Vax, stool soft and Lactinex.    Dispo - complex/guarded.

## 2017-06-19 NOTE — PROGRESS NOTES
Inpatient Anticoagulation Service Note  Date: 6/19/2017  Estimated Creatinine Clearance: 5.7 mL/min (by C-G formula based on Cr of 5.99).  Reason for Anticoagulation: Deep Vein Thrombosis (chronic)   Hemoglobin Value: 7.4  Hematocrit Value: 24  Lab Platelet Value: 131  Target INR: 2.0 to 3.0  INR from last 7 days     Date/Time INR Value    06/19/17 0219 (!)1.88    06/18/17 0439 (!)1.89    06/17/17 0407 (!)1.97    06/16/17 0233 (!)2.29    06/15/17 0011 (!)1.66        Dose from last 7 days     Date/Time Dose (mg)    06/19/17 1300 2.5    06/18/17 1200 2    06/17/17 1000 1    06/16/17 1500 0.5    06/15/17 1300 1    06/14/17 1300 4        Average Dose (mg): (TBD; intermittent refusal at previous care facility)  Significant Interactions: Statin, Proton Pump Inhibitor  Bridge Therapy: No  Comments: INR unmoved overnight. Recent labile INR draws. Some warfarin interactions noted. H/H low/down. PLT low/stable. Per provider discussion GI service likely to be consulted given CC on admission. No overt bleeding noted per chart review. Will give 2.5 mg  today and trend INR. May continue to need dose adjustments.    Plan:  Warfarin 2.5 mg 6/19/17  Education Material Provided?: No  Pharmacist suggested discharge dosing: warfarin 2 mg daily (recommend INR 24-48 hours post-discharge)    Kevin De Jesus, PHARMD

## 2017-06-19 NOTE — PROGRESS NOTES
Gi consult dictated:  Impression:  1. Recent large volume hematochezia suspect diverticular bleed.  2. Diverticulosis on CT scan.  3. Anticoagulation  4. Hx DVT  5. CRF on dialysis  6. Anemia of acute bleed on top of anemia of renal disease.  7. Hospital stay in May for influenza B with superimposed pneumonia  8. HTN  9. Hypothyroidism  10. Severe MR and pulm HTN on echo  11. Thrombocytopenia  12. Asthma.   13. Painful knee arthritis.   14. Umbilical hernia.   Plan:  1. Clear liquid diet tomorrow with Moviprep prep tomorrow night for tentative colonoscopy Wed. 6/21. I went over pro's and con's of colonoscopy with her and she'd like to proceed. I would have a low threshold to cancel if she can't complete the prep or has problems since she is elderly and has multiple medical problems and is high risk for sedation and invasive procedures.   2. Will need to hold coumadin to let INR come down to 1.5 range or less ideally for colonoscopy.   3. Dr. Panchal will see her tomorrow and write prep orders and get her scheduled for Wed.

## 2017-06-19 NOTE — PROGRESS NOTES
Alert and appropriate with staff this am. Sundowners reported at HS. Generalized weakness. Plans to dc to [facility] or with granddaughter depending on pt's strength. HD MWF, 2 liters pulled today.

## 2017-06-20 PROBLEM — G47.00 INSOMNIA DISORDER: Status: ACTIVE | Noted: 2017-06-20

## 2017-06-20 PROBLEM — R55 SYNCOPE, NEAR: Status: RESOLVED | Noted: 2017-06-13 | Resolved: 2017-06-20

## 2017-06-20 LAB
INR PPP: 1.83 (ref 0.87–1.13)
PROTHROMBIN TIME: 21.7 SEC (ref 12–14.6)

## 2017-06-20 PROCEDURE — A9270 NON-COVERED ITEM OR SERVICE: HCPCS | Performed by: INTERNAL MEDICINE

## 2017-06-20 PROCEDURE — 700102 HCHG RX REV CODE 250 W/ 637 OVERRIDE(OP): Performed by: HOSPITALIST

## 2017-06-20 PROCEDURE — 700111 HCHG RX REV CODE 636 W/ 250 OVERRIDE (IP): Performed by: HOSPITALIST

## 2017-06-20 PROCEDURE — 770006 HCHG ROOM/CARE - MED/SURG/GYN SEMI*

## 2017-06-20 PROCEDURE — 97530 THERAPEUTIC ACTIVITIES: CPT

## 2017-06-20 PROCEDURE — 700102 HCHG RX REV CODE 250 W/ 637 OVERRIDE(OP): Performed by: INTERNAL MEDICINE

## 2017-06-20 PROCEDURE — 36415 COLL VENOUS BLD VENIPUNCTURE: CPT

## 2017-06-20 PROCEDURE — A9270 NON-COVERED ITEM OR SERVICE: HCPCS | Performed by: HOSPITALIST

## 2017-06-20 PROCEDURE — 99233 SBSQ HOSP IP/OBS HIGH 50: CPT | Performed by: HOSPITALIST

## 2017-06-20 PROCEDURE — 85610 PROTHROMBIN TIME: CPT

## 2017-06-20 PROCEDURE — 97535 SELF CARE MNGMENT TRAINING: CPT

## 2017-06-20 RX ORDER — OMEPRAZOLE 20 MG/1
20 CAPSULE, DELAYED RELEASE ORAL
Status: DISCONTINUED | OUTPATIENT
Start: 2017-06-20 | End: 2017-06-23 | Stop reason: HOSPADM

## 2017-06-20 RX ORDER — HEPARIN SODIUM 1000 [USP'U]/ML
2200 INJECTION, SOLUTION INTRAVENOUS; SUBCUTANEOUS PRN
Status: DISCONTINUED | OUTPATIENT
Start: 2017-06-20 | End: 2017-06-22

## 2017-06-20 RX ORDER — PEG-3350, SODIUM SULFATE, SODIUM CHLORIDE, POTASSIUM CHLORIDE, SODIUM ASCORBATE AND ASCORBIC ACID 7.5-2.691G
100 KIT ORAL 2 TIMES DAILY
Status: COMPLETED | OUTPATIENT
Start: 2017-06-20 | End: 2017-06-21

## 2017-06-20 RX ADMIN — LACTOBACILLUS ACIDOPHILUS / LACTOBACILLUS BULGARICUS 1 PACKET: 100 MILLION CFU STRENGTH GRANULES at 08:29

## 2017-06-20 RX ADMIN — HEPARIN SODIUM 900 UNITS/HR: 5000 INJECTION, SOLUTION INTRAVENOUS at 16:51

## 2017-06-20 RX ADMIN — OMEPRAZOLE 20 MG: 20 CAPSULE, DELAYED RELEASE ORAL at 08:24

## 2017-06-20 RX ADMIN — LACTOBACILLUS ACIDOPHILUS / LACTOBACILLUS BULGARICUS 1 PACKET: 100 MILLION CFU STRENGTH GRANULES at 16:54

## 2017-06-20 RX ADMIN — RENAGEL 800 MG: 800 TABLET ORAL at 17:30

## 2017-06-20 RX ADMIN — SENNOSIDES AND DOCUSATE SODIUM 2 TABLET: 8.6; 5 TABLET ORAL at 08:27

## 2017-06-20 RX ADMIN — SIMVASTATIN 40 MG: 20 TABLET, FILM COATED ORAL at 20:49

## 2017-06-20 RX ADMIN — POLYETHYLENE GLYCOL 3350, SODIUM SULFATE, SODIUM CHLORIDE, POTASSIUM CHLORIDE, ASCORBIC ACID, SODIUM ASCORBATE 100 G: KIT at 18:46

## 2017-06-20 RX ADMIN — Medication 325 MG: at 08:26

## 2017-06-20 RX ADMIN — RENAGEL 800 MG: 800 TABLET ORAL at 08:29

## 2017-06-20 RX ADMIN — OMEPRAZOLE 20 MG: 20 CAPSULE, DELAYED RELEASE ORAL at 16:54

## 2017-06-20 RX ADMIN — LOSARTAN POTASSIUM 25 MG: 50 TABLET, FILM COATED ORAL at 08:27

## 2017-06-20 RX ADMIN — METOPROLOL SUCCINATE 25 MG: 25 TABLET, EXTENDED RELEASE ORAL at 08:28

## 2017-06-20 RX ADMIN — LACTOBACILLUS ACIDOPHILUS / LACTOBACILLUS BULGARICUS 1 PACKET: 100 MILLION CFU STRENGTH GRANULES at 12:33

## 2017-06-20 ASSESSMENT — ENCOUNTER SYMPTOMS
DIARRHEA: 0
CHILLS: 0
BLOOD IN STOOL: 0
FEVER: 0
INSOMNIA: 1
ABDOMINAL PAIN: 0
HEMOPTYSIS: 0
VOMITING: 0
NAUSEA: 0
SHORTNESS OF BREATH: 0

## 2017-06-20 ASSESSMENT — COGNITIVE AND FUNCTIONAL STATUS - GENERAL
TOILETING: A LOT
SUGGESTED CMS G CODE MODIFIER DAILY ACTIVITY: CK
EATING MEALS: A LITTLE
HELP NEEDED FOR BATHING: A LOT
DAILY ACTIVITIY SCORE: 14
DRESSING REGULAR UPPER BODY CLOTHING: A LOT
DRESSING REGULAR LOWER BODY CLOTHING: A LOT
PERSONAL GROOMING: A LITTLE

## 2017-06-20 ASSESSMENT — PAIN SCALES - GENERAL
PAINLEVEL_OUTOF10: 5
PAINLEVEL_OUTOF10: 0

## 2017-06-20 ASSESSMENT — PATIENT HEALTH QUESTIONNAIRE - PHQ9
SUM OF ALL RESPONSES TO PHQ9 QUESTIONS 1 AND 2: 0
2. FEELING DOWN, DEPRESSED, IRRITABLE, OR HOPELESS: NOT AT ALL
1. LITTLE INTEREST OR PLEASURE IN DOING THINGS: NOT AT ALL
SUM OF ALL RESPONSES TO PHQ QUESTIONS 1-9: 0

## 2017-06-20 NOTE — DISCHARGE PLANNING
PMR referral from Dr. Stratton      Chart review current plan is for skilled nursing. No physiatry consult ordered per protocol.   Thank you for the opportunity to participate in this patients care as she prepare to transition to post acute care.

## 2017-06-20 NOTE — THERAPY
"Occupational Therapy Treatment completed with focus on ADLs, ADL transfers and patient education.  Functional Status:  Pt seen for OT tx. Min A supine to sit given extra time to complete tasks d/t verbose conservation impacting attention. Min A scooting to EOB, mod A sit to stand, during standing pt able to tolerate WB through both LEs. Pt pleasant and cooperative. Pt highly distracted by conversation and was requiring cueing to redirect but easily directed back to task.   Plan of Care: Will benefit from Occupational Therapy 3 times per week  Discharge Recommendations:  Equipment Will Continue to Assess for Equipment Needs. Post-acute therapy Discharge to a transitional care facility for continued skilled therapy services.    See \"Rehab Therapy-Acute\" Patient Summary Report for complete documentation.   "

## 2017-06-20 NOTE — PROGRESS NOTES
Gastroenterology Progress Note:    Ting-Marion Ansley  Date & Time note created:    6/20/2017   2:13 PM     Patient ID:  Name:             Skye James  YOB: 1935  Age:                 82 y.o.  female  MRN:               [id number]    Referring MD:  Dr. Stratton                                                             Chief Complaint(s):      Recent large volume lower GI bleeding.    History of Present Illness:    ===Dr. Stein's Consult===  The patient is a very pleasant 82-year-old woman admitted late on June 11th because of passage of bloody stools.  She is a fair historian at best and apparently she passed several bloody stools and 911 was called.  She was admitted late on Sarah the 11th, upon arrival to her room,  she had a syncopal episode and code blue was called.  She denies any rectal    bleeding since then.  She had been admitted here in May for influenza B with superimposed pneumonia.  She was here from 12th to about the 17th of May and from there went to [facility].  Despite trying to get a detailed history from her, I cannot really tell if she came from home or from [facility].  She apparently had some arguing with one of her grandchildren about whether she passed blood from her bowel or from her urine.  The bleeding stopped at the same day and on the 12th, stool came back Hemoccult negative.  She does not  recall any abdominal pain then or now. She said she may have had a COL done at [facility] in 2016, but I was not able to locate the procedure note in our system  ===  6/20/2017 Doing ok. No hematochezia. Willing to proceed with COL.    Otherwise the patient is doing fine without complaints of fever/chills/weight loss/appetite change/dysphagia/odynophagia/heartburn/nausea/vomiting/bloating/constipation/diarrhea/melena or abdominal pain.    Review of Systems:      Constitutional: Denies fevers, weight changes  Eyes: Denies changes in vision, jaundice  Ears/Nose/Throat/Mouth: Denies nasal  "congestion or sore throat   Cardiovascular: Denies chest pain, Denies palpitations   Respiratory: Denies shortness of breath, denies cough  Gastrointestinal/Hepatic: Denies abdominal pain, nausea, vomiting, diarrhea, constipation. Pos GI bleeding   Genitourinary: Denies dysuria or frequency  Musculoskeletal/Rheum: Denies  joint pain and swelling, edema  Skin: Denies rash  Neurological: Denies headache, confusion, memory loss or focal weakness/parasthesias  Psychiatric: denies mood disorder   Endocrine: Clau thyroid problems  Heme/Oncology/Lymph Nodes: Denies enlarged lymph nodes, denies brusing or known bleeding disorder  All other systems were reviewed and are negative (AMA/CMS criteria)              Past Medical History:   Past Medical History   Diagnosis Date   • GOUT    • other      \"thick blood\"   • Hypertension    • CKD (chronic kidney disease)    • Renal disorder    • Arrhythmia    • Heart murmur    • MEDICAL HOME 4/23/2013   • Arthritis      left knee   • DVT (deep venous thrombosis) (CMS-HCC)      history of DVT 3 times   • CATARACT      gabi surgery  completed   • Preoperative clearance 7/15/2013   • Unspecified hemorrhagic conditions      on coumadin   • Dialysis      M, W, F in [place]   • Pneumonia 5/24/2014   • Other specified disorder of intestines      constipation   • ASTHMA      no meds necessary since moving to [place] from [place]   • Asthma      inhalers as needed   • Anemia    • Dental disorder      dentures   • Falls    • Anginal syndrome (CMS-HCC)    • Glaucoma    • Fall    • Indigestion    • Disorder of thyroid    • Chronic anticoagulation    • Moderate mitral regurgitation    • PAH (pulmonary artery hypertension) - liekly due to mitral regurgitation but with history of thromboembolic disease      Active Hospital Problems    Diagnosis   • Hematochezia [K92.1]     Priority: High   • ESRD (end stage renal disease) on dialysis (CMS-HCC) [N18.6, Z99.2]     Priority: Medium   • Chronic " anticoagulation [Z79.01]     Priority: Low   • Pancytopenia (CMS-HCC) [D61.818]     Priority: Low   • Essential hypertension [I10]     Priority: Low   • Insomnia disorder [G47.00]   • Dementia [F03.90]   • Left knee pain [M25.562]       Past Surgical History:  Past Surgical History   Procedure Laterality Date   • Tonsillectomy     • Hysterectomy laparoscopy       L side   • Av fistula creation  6/22/2010     Performed by MICHA KIRBY at SURGERY [facility] ORS   • Recovery  4/3/2012     Performed by SURGERY, IR-RECOVERY at SURGERY SAME DAY [facility]   • Cataract phaco with iol  6/20/2012     Performed by LEO RENDON at SURGERY [facility] ORS   • Recovery  11/27/2012     Performed by Ir-Recovery Surgery at SURGERY SAME DAY [facility]S   • Recovery  7/16/2013     Performed by Ir-Recovery Surgery at SURGERY SAME DAY [facility] ORS   • Recovery  8/5/2014     Performed by Ir-Recovery Surgery at SURGERY SAME DAY [facility] Medications:  Current Facility-Administered Medications   Medication Dose Frequency Provider Last Rate Last Dose   • MD ALERT...HEPARIN WEIGHT BASED PROTOCOL Pharmacist to implement   PRN Edy Stratton M.D.       • peg-electrolyte solution (MOVIPREP) package 100 g  100 g BID Jose Alberto Panchal M.D.       • acetaminophen (TYLENOL) tablet 650 mg  650 mg Q4HRS PRN Edy Stratton M.D.       • lactobacillus granules (LACTINEX/FLORANEX) packet 1 Packet  1 Packet TID WITH MEALS Edy Stratton M.D.   1 Packet at 06/20/17 1233   • ferrous sulfate tablet 325 mg  325 mg QDAY with Breakfast Edy Stratton M.D.   325 mg at 06/20/17 0826   • losartan (COZAAR) tablet 25 mg  25 mg DAILY Mandy Guzman M.D.   25 mg at 06/20/17 0827   • metoprolol SR (TOPROL XL) tablet 25 mg  25 mg DAILY Mandy Guzman M.D.   25 mg at 06/20/17 0828   • sevelamer (RENAGEL) tablet 800 mg  800 mg BID WITH MEALS Mandy Guzman M.D.   800 mg at 06/20/17 0829   • lidocaine (XYLOCAINE) 1 % solution  1 mL PRN Deonna Mccarthy  "ALEJANDRADSushma   1 mL at 06/14/17 0740   • simvastatin (ZOCOR) tablet 40 mg  40 mg Nightly Mandy Guzman M.D.   40 mg at 06/19/17 2053   • senna-docusate (PERICOLACE or SENOKOT S) 8.6-50 MG per tablet 2 Tab  2 Tab BID Mandy Guzman M.D.   2 Tab at 06/20/17 0827    And   • polyethylene glycol/lytes (MIRALAX) PACKET 1 Packet  1 Packet QDAY PRN Mandy Guzman M.D.        And   • magnesium hydroxide (MILK OF MAGNESIA) suspension 30 mL  30 mL QDAY PRN Mandy Guzman M.D.        And   • bisacodyl (DULCOLAX) suppository 10 mg  10 mg QDAY PRN Mandy Guzman M.D.       • ondansetron (ZOFRAN) syringe/vial injection 4 mg  4 mg Q4HRS PRN Mandy Guzman M.D.   4 mg at 06/12/17 0216   • ondansetron (ZOFRAN ODT) dispertab 4 mg  4 mg Q4HRS PRN Mandy Guzman M.D.       • omeprazole (PRILOSEC) capsule 20 mg  20 mg BID Mandy Guzman M.D.   20 mg at 06/20/17 0824     Last reviewed on 6/11/2017 11:42 PM by Thomas Sorensen    Current Outpatient Medications:  Prescriptions prior to admission   Medication Sig Dispense Refill Last Dose   • losartan (COZAAR) 25 MG Tab Take 25 mg by mouth every day.   6/11/2017 at 0700   • SENSIPAR 90 MG Tab Take 1 Tab by mouth every day.   6/11/2017 at 0700   • metoprolol SR (TOPROL XL) 25 MG TABLET SR 24 HR Take 25 mg by mouth every day.   6/11/2017 at 0700   • Sevelamer Carbonate 800 MG Tab Take 1 Tab by mouth 2 Times a Day.   6/11/2017 at 0700   • simvastatin (ZOCOR) 40 MG Tab Take 40 mg by mouth every evening.   6/10/2017 at 1930       Medication Allergy:  Allergies   Allergen Reactions   • Nkda [No Known Drug Allergy]        Physical Exam:  Weight/BMI: Body mass index is 20.88 kg/(m^2).  Blood pressure 126/58, pulse 65, temperature 36.3 °C (97.3 °F), resp. rate 16, height 1.575 m (5' 2\"), weight 51.8 kg (114 lb 3.2 oz), last menstrual period 05/12/1970, SpO2 98 %.  Filed Vitals:    06/19/17 1238 06/19/17 1600 06/19/17 2000 06/20/17 0800   BP: 118/41 132/70 133/45 126/58   Pulse: 66 96 71 65   Temp: 37 °C " (98.6 °F) 36.7 °C (98.1 °F) 37 °C (98.6 °F) 36.3 °C (97.3 °F)   Resp: 18 18 16 16   Height:       Weight:       SpO2: 100% 97% 100% 98%     Oxygen Therapy:  Pulse Oximetry: 98 %, O2 (LPM): 3, O2 Delivery: Nasal Cannula  No intake or output data in the 24 hours ending 06/20/17 1413    Constitutional:   Well developed, well nourished, no acute distress  HEENT:  Normocephalic, Atraumatic, Conjunctiva not pale, Sclera not icteric, Oropharynx moist mucous membranes, No oral exudates, Nose normal.  No thyromegaly.  Neck:  Normal range of motion, No cervical tenderness,  no JVD.  Chest/Lungs:  Symmetric expansion, no spider angioma, breath sounds clear to auscultation bilaterally,  no crackles, no wheezing.   Cardiovascular:  Normal heart rate, Normal rhythm, No murmurs, No rubs, No gallops.    Abdomen: Bowel sounds normal, Soft, No tenderness, No guarding, No rebound, No masses, No hepatosplenomegaly.  Extremities: No cyanosis/clubbing/edema/palmar erythema/flapping tremor. AVF at right upper arm  Skin: Warm, Dry, No erythema, No rash, no induration.    MDM (Data Review):     Records reviewed and summarized in current documentation    Lab Data Review:  Recent Results (from the past 24 hour(s))   PROTHROMBIN TIME    Collection Time: 06/20/17  2:11 AM   Result Value Ref Range    PT 21.7 (H) 12.0 - 14.6 sec    INR 1.83 (H) 0.87 - 1.13         Imaging/Procedures Review:    3/21/17 CT:   1. Diffuse anasarca. No evidence of acute inflammatory change but study is very limited due to nonuse of intravenous contrast.  2. Colonic diverticula.  3. Patchy left lower lobe opacity, atelectasis versus consolidation.  4. Cardiomegaly.    MDM (Assessment and Plan):     Active Hospital Problems    Diagnosis   • Hematochezia [K92.1]     Priority: High   • ESRD (end stage renal disease) on dialysis (CMS-HCC) [N18.6, Z99.2]     Priority: Medium   • Chronic anticoagulation [Z79.01]     Priority: Low   • Pancytopenia (CMS-[facility]) [D61.818]      Priority: Low   • Essential hypertension [I10]     Priority: Low   • Insomnia disorder [G47.00]   • Dementia [F03.90]   • Left knee pain [M25.562]         Assessment  1.  Recent large volume hematochezia, she has diverticulosis on the CAT scan, suspect she had a diverticular bleed.  2.  Diverticulosis on CT scan.  3.  Anticoagulation.  4.  History of deep venous thrombosis.  5.  Chronic renal failure on dialysis.  6.  Anemia of acute bleed on top of anemia of renal disease.  7.  Hospital stay in May for influenza B with superimposed pneumonia.  8.  Hypertension.  9.  Hypothyroidism.  10.  Severe mitral regurg and pulmonary hypertension on an echocardiogram.  11.  Thrombocytopenia.  12.  Asthma.  13.  Painful knee arthritis.  14.  Umbilical hernia.    Plan  1.  PPI BIDAC  2.  Cl liq diet.  3.  NPO after midnight  4.  Monitor H/H and vitals. Serial hemoglobin and hematocrit q. 6 hours with transfusions as needed for hemoglobin less than 7 or hematocrit less than 21 (I would recommend transfuse 1 unit for these cut offs).  5.  Hospitalist to manage comorbid conditions.  6.  Colonoscopy with possible hemostasis tomorrow    Risks, benefits, and alternatives were discussed with patient. Consenting persons were given an opportunity to ask questions and discuss other options. Risks including but not limited to failed or incomplete endoscopy, ineffective therapy, perforation, infection, bleeding, missed lesion(s), cardiac and/or pulmonary event, aspiration, stroke, possible need for surgery, hospitalization possibly prolonged, discomfort, unsuccessful and/or incomplete procedure, possible need for repeat procedures and/or additional testings, damage to adjacent organs and/or vascular structures, medication reaction, disability, death, and other adverse events possibly life-threatening. Discussion was undertaken with Layman's terms. Consenting persons stated understanding and acceptance of these risks, and wished to proceed.  Consent was given in clear state of mind. All questions were answered.    Thank you very much for allowing me to participate in the care of your patient.  Please feel free to contact me anytime at [phone].     Jose Alberto Panchal M.D.

## 2017-06-20 NOTE — PROGRESS NOTES
[facility]    Date of Service: 2017    Chief Complaint  82 y.o. female admitted 2017 with presyncope and BRBPR.    Interval Problem Update  Pt is feeling Ok.  No GI has seen and evaluated the patient.  Denies recurrent GIB.  Awaiting GI.    Consultants/Specialty  Neph and GI.    Disposition          Review of Systems   Respiratory: Negative for shortness of breath.    Cardiovascular: Negative for chest pain.   Gastrointestinal: Negative for abdominal pain, diarrhea and blood in stool.   Psychiatric/Behavioral: The patient has insomnia.    All other systems reviewed and are negative.     Physical Exam  Laboratory/Imaging   Hemodynamics  Temp (24hrs), Av.7 °C (98 °F), Min:36.3 °C (97.3 °F), Max:37 °C (98.6 °F)   Temperature: 36.3 °C (97.3 °F)  Pulse  Av.3  Min: 52  Max: 96    Blood Pressure : 126/58 mmHg      Respiratory      Respiration: 16, Pulse Oximetry: 98 %             Fluids  No intake or output data in the 24 hours ending 17 1357    Nutrition  Orders Placed This Encounter   Procedures   • DIET ORDER     Standing Status: Standing      Number of Occurrences: 1      Standing Expiration Date:      Order Specific Question:  Diet:     Answer:  Renal [8]      Comments:  NO RED FOOD     Order Specific Question:  Diet:     Answer:  Clear Liquids - No Red Foods [12]     Physical Exam  Nursing note and vitals reviewed.  Constitutional: She is oriented to person, place, and time. She appears well-developed and well-nourished overweight.   HENT:   Head: Normocephalic and atraumatic.   Right Ear: External ear normal.   Left Ear: External ear normal.   Nose: Nose normal.   Eyes: Conjunctivae and extraocular motions are normal.    Neck: Normal range of motion. Neck supple.   Cardiovascular: Normal rate.    Pulmonary/Chest: Effort normal.   Abdominal: Soft. Bowel sounds are normal.   Musculoskeletal: Normal range of motion.   Neurological: She is alert and oriented to person,  place, and time.   Skin: Skin is warm and dry.             Recent Labs      06/18/17 0439 06/19/17 0219   WBC  4.6*  5.1   RBC  2.49*  2.37*   HEMOGLOBIN  8.0*  7.4*   HEMATOCRIT  25.7*  24.0*   MCV  103.2*  101.3*   MCH  32.1  31.2   MCHC  31.1*  30.8*   RDW  57.8*  55.4*   PLATELETCT  123*  131*   MPV  11.3  11.6     Recent Labs      06/18/17 0439 06/19/17 0219   SODIUM  135  131*   POTASSIUM  4.6  4.8   CHLORIDE  100  99   CO2  29  29   GLUCOSE  69  79   BUN  27*  32*   CREATININE  5.24*  5.99*   CALCIUM  9.6  9.3     Recent Labs      06/18/17 0439 06/19/17 0219 06/20/17 0211   INR  1.89*  1.88*  1.83*                  Assessment/Plan     Hematochezia (present on admission)  Assessment & Plan  Hgb stable and FOB neg.  Cont Fe replacement.  Await GI input.      ESRD (end stage renal disease) on dialysis (CMS-[facility]) (present on admission)  Assessment & Plan  HD per neph.      Left knee pain (present on admission)  Assessment & Plan  Xray shows arthritic changes and moderate effusion.  Consider tap if no improvement with conservative measures.  Ice pack PRN to knee.      Dementia (present on admission)  Assessment & Plan  Pleasantly demented, appears to be baseline.  Min narc/sed when possible.    Insomnia disorder  Assessment & Plan  Outpatient follow up.    Essential hypertension (present on admission)  Assessment & Plan   Currently controlled, continue Cozaar, Metoprolol, Hydralazine PRN.  Follow.    Pancytopenia (CMS-[facility]) (present on admission)  Assessment & Plan  Has Fe deficiency.  Cont Fe replacement.    Chronic anticoagulation (present on admission)  Assessment & Plan  Hold Coumadin until after GI eval and bridge c hep gtt.      Medications reviewed and Labs reviewed  [place] catheter: No [place]      DVT Prophylaxis: Warfarin (Coumadin)    Ulcer prophylaxis: Not indicated    Assessed for rehab: Patient was assess for and/or received rehabilitation services during this hospitalization      Pulm  HTN - outpatient follow up.    Stable issues - med hx (DVT, angina, glaucoma, hypothyroid, HLD, recent influenza infection), preventives, syncope.    Dispo - complex/guarded.

## 2017-06-20 NOTE — PROGRESS NOTES
Nephrology Progress Note, Adult, Complex               Author:Fadi Najjar Date & Time created: 2017  3:45 PM     Interval History:  81 y/o female with ESRD/HD admitted with anemia, recta; bleeding, vivian, syncopal episode  Doing better.  Left knee pain -better  BP well controlled    Review of Systems:  Review of Systems   Constitutional: Negative for fever and chills.   Respiratory: Negative for hemoptysis and shortness of breath.    Cardiovascular: Negative for chest pain.   Gastrointestinal: Negative for nausea and vomiting.       Physical Exam:  Physical Exam   Constitutional: She is oriented to person, place, and time. She appears cachectic. No distress.   HENT:   Head: Normocephalic and atraumatic.   Mouth/Throat: Oropharynx is clear and moist.   Eyes: No scleral icterus.   Cardiovascular: Normal rate and regular rhythm.    Pulmonary/Chest: Effort normal and breath sounds normal. No respiratory distress. She has no wheezes.   Musculoskeletal: She exhibits no edema.   Neurological: She is alert and oriented to person, place, and time. No cranial nerve deficit.   Nursing note and vitals reviewed.      Labs:        Invalid input(s): [id number]      Recent Labs      17   SODIUM  135  131*   POTASSIUM  4.6  4.8   CHLORIDE  100  99   CO2  29  29   BUN  27*  32*   CREATININE  5.24*  5.99*   CALCIUM  9.6  9.3     Recent Labs      17   GLUCOSE  69  79     Recent Labs      17   RBC  2.49*  2.37*   --    HEMOGLOBIN  8.0*  7.4*   --    HEMATOCRIT  25.7*  24.0*   --    PLATELETCT  123*  131*   --    PROTHROMBTM  22.3*  22.2*  21.7*   INR  1.89*  1.88*  1.83*     Recent Labs      17   WBC  4.6*  5.1           Hemodynamics:  Temp (24hrs), Av.7 °C (98 °F), Min:36.3 °C (97.3 °F), Max:37 °C (98.6 °F)  Temperature: 36.3 °C (97.3 °F)  Pulse  Av.3  Min: 52  Max: 96   Blood  Pressure : 126/58 mmHg     Respiratory:    Respiration: 16, Pulse Oximetry: 98 %           Fluids:  No intake or output data in the 24 hours ending 06/20/17 1545  Weight: 52.5 kg (115 lb 11.9 oz)  GI/Nutrition:  Orders Placed This Encounter   Procedures   • DIET ORDER     Standing Status: Standing      Number of Occurrences: 1      Standing Expiration Date:      Order Specific Question:  Diet:     Answer:  Renal [8]      Comments:  NO RED FOOD     Order Specific Question:  Diet:     Answer:  Clear Liquids - No Red Foods [12]   • DIET NPO     Standing Status: Standing      Number of Occurrences: 1      Standing Expiration Date:      Order Specific Question:  Restrict to:     Answer:  Sips with Medications [3]     Medical Decision Making, by Problem:  Active Hospital Problems    Diagnosis   • Hematochezia [K92.1]   • ESRD (end stage renal disease) on dialysis (CMS-HCC) [N18.6, Z99.2]   • Chronic anticoagulation [Z79.01]   • Essential hypertension [I10]   • Anemia of chronic disease [D63.8]   • Syncope, near [R55]   • Left knee pain [M25.562]       Labs reviewed                      Assessment and Plan  1.ESRD/HD -MWF  2.HTN: BP well conttrolled  3.Electrolytes: well controlled.  4.Anemia:Epogen with HD  5.Volume:elevated BNP - to UF with HD as tolerates    Recs:   HD MWF  No need for HD today   All meds to renal doses   Diet: renal

## 2017-06-20 NOTE — CONSULTS
DATE OF SERVICE:  06/19/2017    DATE OF CONSULTATION:  06/19/2017    REFERRING PHYSICIAN:  Edy Stratton MD.    PRIMARY CARE PHYSICIAN:  Alistair Gandara MD.    REASON FOR CONSULTATION:  Recent large volume lower GI bleeding.    HISTORY OF PRESENT ILLNESS:  The patient is a very pleasant 82-year-old woman   admitted late on June 11th because of passage of bloody stools.  She is a fair   historian at best and apparently she passed several bloody stools and 911 was   called.  She was admitted late on Sarah the 11th, upon arrival to her room,   she had a syncopal episode and code blue was called.  She denies any rectal   bleeding since then.  She had been admitted here in May for influenza B with   superimposed pneumonia.  She was here from 12th to about the 17th of May and   from there went to [facility].  Despite trying to get a detailed history from   her, I cannot really tell if she came from home or from [facility].  She   apparently had some arguing with one of her grandchildren about whether she   passed blood from her bowel or from her urine.  The bleeding stopped at the   same day and on the 12th, stool came back Hemoccult negative.  She does not   recall any abdominal pain then or now.    PAST MEDICAL HISTORY:  Notable for asthma, chronic renal failure on dialysis,   hypertension, recurrent DVT, anticoagulation, hypothyroidism, hyperlipidemia,   thrombocytopenia, diverticulosis, mitral regurgitation on echocardiogram and   pulmonary hypertension.  She also had a recent hospitalization for influenza B   with superimposed pneumonia.    PAST SURGICAL HISTORY:  She has had an AV fistula and a hysterectomy.    ALLERGIES:  No known drug allergies.    CURRENT MEDICATIONS:  Tylenol, hydralazine, lidocaine, losartan, metoprolol,   midodrine, omeprazole, Zofran, bisacodyl, Renagel and simvastatin.  She was   also on Coumadin.    SOCIAL HISTORY:  She is .  She states she is not sure how she ended up   at  [facility] but apparently she was debilitated after her recent influenza.    HABITS:  No tobacco or alcohol.    FAMILY HISTORY:  Noncontributory.    REVIEW OF SYSTEMS:  CONSTITUTIONAL:  She had some weight loss.  NEUROLOGIC:  No strokes or seizures.  PSYCHIATRIC:  Denies anxiety or depression.  PULMONARY:  She gets occasional shortness of breath.  CARDIAC:  No recent chest pain.  GASTROINTESTINAL:  She has some constipation symptoms.  GENITOURINARY:  No problems.  MUSCULOSKELETAL:  Her left knee is swollen and painful and there is mention of   gout in her old chart, although I do not see it documented other than one   place.    PHYSICAL EXAMINATION:  GENERAL:  Alert, elderly female in no distress.  VITAL SIGNS:  Temperature is 37, pulse 66, respirations 18, blood pressure is   118/41.  SKIN:  No active skin lesions.  LYMPH NODES:  No nodes.  HEAD AND NECK:  Sclerae are anicteric.  Oropharynx clear.  NECK:  Supple.  LUNGS:  Clear bilaterally.  HEART:  Regular rate and rhythm, and I do not appreciate a murmur.  ABDOMEN:  She has an umbilical hernia.  The abdomen is soft and nontender.  EXTREMITIES:  She has some arthritic changes and a small effusion in her left   knee, but the knee is not hot.  There is no edema in her extremities.    LABORATORY DATA:  White count is 5.1, hemoglobin 7.4, hematocrit 24, ,   platelets 131,000.  Sodium 131, potassium 4.8, BUN 32, creatinine 5.99.  INR   today is 1.88.    IMPRESSION:  1.  Recent large volume hematochezia, she has diverticulosis on the CAT scan   and I suspect she had a diverticular bleed.  2.  Diverticulosis on CT scan.  3.  Anticoagulation.  4.  History of deep venous thrombosis.  5.  Chronic renal failure on dialysis.  6.  Anemia of acute bleed on top of anemia of renal disease.  7.  Hospital stay in May for influenza B with superimposed pneumonia.  8.  Hypertension.  9.  Hypothyroidism.  10.  Severe mitral regurg and pulmonary hypertension on an  echocardiogram.  11.  Thrombocytopenia.  12.  Asthma.  13.  Painful knee arthritis.  14.  Umbilical hernia.    PLAN:  1.  Clear liquid diet starting tomorrow with a MoviPrep tomorrow night for   tentative colonoscopy on Wednesday the 21st.  I went over the pros and cons of   colonoscopy with her and she would like to proceed.  I told her I would have   a low threshold to cancel the procedure if she cannot complete the prep or has   problems with the preps and she is frail and elderly and has multiple medical   problems and is high risk for sedation and invasive procedures.  2.  I have written for the Coumadin to be stopped to allow her INR to come   down to 1.5 or less ideally for colonoscopy.  She may need heparin, Lovenox   bridge therapy or sequentials.  3.  Dr. ____ will see her tomorrow and write prep orders and get her scheduled   for Wednesday colonoscopy.  Medical decision making is complex and she is   high risk for sedation and invasive procedures.       ____________________________________     DAISHA SETH MD    [facility] / [facility]    DD:  06/19/2017 16:30:48  DT:  06/19/2017 19:57:54    D#:  [id number]  Job#:  [id number]

## 2017-06-21 PROBLEM — R54 SENILE DEBILITY: Status: ACTIVE | Noted: 2017-06-21

## 2017-06-21 PROBLEM — R19.5 LOOSE STOOLS: Status: ACTIVE | Noted: 2017-06-21

## 2017-06-21 LAB
APTT PPP: 126.6 SEC (ref 24.7–36)
APTT PPP: 76.5 SEC (ref 24.7–36)
APTT PPP: >240 SEC (ref 24.7–36)
HCT VFR BLD AUTO: 23.9 % (ref 37–47)
HCT VFR BLD AUTO: 30.4 % (ref 37–47)
HGB BLD-MCNC: 7.5 G/DL (ref 12–16)
HGB BLD-MCNC: 9.5 G/DL (ref 12–16)
INR PPP: 1.87 (ref 0.87–1.13)
PROTHROMBIN TIME: 22.1 SEC (ref 12–14.6)

## 2017-06-21 PROCEDURE — A9270 NON-COVERED ITEM OR SERVICE: HCPCS | Performed by: INTERNAL MEDICINE

## 2017-06-21 PROCEDURE — 700102 HCHG RX REV CODE 250 W/ 637 OVERRIDE(OP): Performed by: INTERNAL MEDICINE

## 2017-06-21 PROCEDURE — 700102 HCHG RX REV CODE 250 W/ 637 OVERRIDE(OP): Performed by: HOSPITALIST

## 2017-06-21 PROCEDURE — 85730 THROMBOPLASTIN TIME PARTIAL: CPT | Mod: 91

## 2017-06-21 PROCEDURE — A9270 NON-COVERED ITEM OR SERVICE: HCPCS | Performed by: HOSPITALIST

## 2017-06-21 PROCEDURE — 700111 HCHG RX REV CODE 636 W/ 250 OVERRIDE (IP)

## 2017-06-21 PROCEDURE — 770006 HCHG ROOM/CARE - MED/SURG/GYN SEMI*

## 2017-06-21 PROCEDURE — 36415 COLL VENOUS BLD VENIPUNCTURE: CPT

## 2017-06-21 PROCEDURE — 85610 PROTHROMBIN TIME: CPT

## 2017-06-21 PROCEDURE — 99233 SBSQ HOSP IP/OBS HIGH 50: CPT | Performed by: HOSPITALIST

## 2017-06-21 PROCEDURE — 85014 HEMATOCRIT: CPT

## 2017-06-21 PROCEDURE — 85018 HEMOGLOBIN: CPT

## 2017-06-21 PROCEDURE — 90935 HEMODIALYSIS ONE EVALUATION: CPT

## 2017-06-21 RX ORDER — SODIUM CHLORIDE 9 MG/ML
500 INJECTION, SOLUTION INTRAVENOUS
Status: ACTIVE | OUTPATIENT
Start: 2017-06-21 | End: 2017-06-21

## 2017-06-21 RX ORDER — MIDAZOLAM HYDROCHLORIDE 1 MG/ML
.5-2 INJECTION INTRAMUSCULAR; INTRAVENOUS PRN
Status: ACTIVE | OUTPATIENT
Start: 2017-06-21 | End: 2017-06-21

## 2017-06-21 RX ORDER — PEG-3350, SODIUM SULFATE, SODIUM CHLORIDE, POTASSIUM CHLORIDE, SODIUM ASCORBATE AND ASCORBIC ACID 7.5-2.691G
100 KIT ORAL ONCE
Status: COMPLETED | OUTPATIENT
Start: 2017-06-21 | End: 2017-06-21

## 2017-06-21 RX ADMIN — POLYETHYLENE GLYCOL 3350, SODIUM SULFATE, SODIUM CHLORIDE, POTASSIUM CHLORIDE, ASCORBIC ACID, SODIUM ASCORBATE 100 G: KIT at 01:30

## 2017-06-21 RX ADMIN — OMEPRAZOLE 20 MG: 20 CAPSULE, DELAYED RELEASE ORAL at 18:17

## 2017-06-21 RX ADMIN — LACTOBACILLUS ACIDOPHILUS / LACTOBACILLUS BULGARICUS 1 PACKET: 100 MILLION CFU STRENGTH GRANULES at 18:17

## 2017-06-21 RX ADMIN — RENAGEL 800 MG: 800 TABLET ORAL at 18:17

## 2017-06-21 RX ADMIN — SIMVASTATIN 40 MG: 20 TABLET, FILM COATED ORAL at 21:38

## 2017-06-21 RX ADMIN — POLYETHYLENE GLYCOL 3350, SODIUM SULFATE, SODIUM CHLORIDE, POTASSIUM CHLORIDE, ASCORBIC ACID, SODIUM ASCORBATE 100 G: KIT at 21:40

## 2017-06-21 ASSESSMENT — ENCOUNTER SYMPTOMS
ABDOMINAL PAIN: 0
SHORTNESS OF BREATH: 0
NAUSEA: 0
INSOMNIA: 1
DIARRHEA: 1
CHILLS: 0
FEVER: 0
BLOOD IN STOOL: 0
VOMITING: 0
HEMOPTYSIS: 0

## 2017-06-21 ASSESSMENT — PAIN SCALES - GENERAL
PAINLEVEL_OUTOF10: 2
PAINLEVEL_OUTOF10: 3
PAINLEVEL_OUTOF10: 0
PAINLEVEL_OUTOF10: 2
PAINLEVEL_OUTOF10: 0

## 2017-06-21 ASSESSMENT — PATIENT HEALTH QUESTIONNAIRE - PHQ9
1. LITTLE INTEREST OR PLEASURE IN DOING THINGS: NOT AT ALL
SUM OF ALL RESPONSES TO PHQ QUESTIONS 1-9: 0
SUM OF ALL RESPONSES TO PHQ9 QUESTIONS 1 AND 2: 0
2. FEELING DOWN, DEPRESSED, IRRITABLE, OR HOPELESS: NOT AT ALL

## 2017-06-21 NOTE — PROGRESS NOTES
Pt is AAOx4, c/o pain in right knee - refused pain medications at this time, provided extra pillows and heat pads for comfort, NPO at 0300, encouraging pt to take Moviprep, next APTT due @ 0856, order placed, Q2 turns in place, bed alarm is on, call light and personal belongings within reach.

## 2017-06-21 NOTE — PROGRESS NOTES
Nephrology Progress Note, Adult, Complex               Author:Keith Grantjjar Date & Time created: 2017  1:45 PM     Interval History:  83 y/o female with ESRD/HD admitted with anemia, recta; bleeding, vivian, syncopal episode  Left knee pain -better  BP well controlled  Seen and examined while getting HD.    Review of Systems:  Review of Systems   Constitutional: Negative for fever and chills.   Respiratory: Negative for hemoptysis and shortness of breath.    Cardiovascular: Negative for chest pain.   Gastrointestinal: Negative for nausea and vomiting.       Physical Exam:  Physical Exam   Constitutional: She is oriented to person, place, and time. She appears cachectic. No distress.   HENT:   Head: Normocephalic and atraumatic.   Mouth/Throat: Oropharynx is clear and moist.   Eyes: No scleral icterus.   Cardiovascular: Normal rate and regular rhythm.    Pulmonary/Chest: Effort normal and breath sounds normal. No respiratory distress. She has no wheezes.   Musculoskeletal: She exhibits no edema.   Neurological: She is alert and oriented to person, place, and time. No cranial nerve deficit.   Nursing note and vitals reviewed.      Labs:        Invalid input(s): [id number]      Recent Labs      17   SODIUM  131*   POTASSIUM  4.8   CHLORIDE  99   CO2  29   BUN  32*   CREATININE  5.99*   CALCIUM  9.3     Recent Labs      17   GLUCOSE  79     Recent Labs      17   02117   0211  17   0225  17   0856  17   1227   RBC  2.37*   --    --    --    --    HEMOGLOBIN  7.4*   --    --    --   9.5*   HEMATOCRIT  24.0*   --    --    --   30.4*   PLATELETCT  131*   --    --    --    --    PROTHROMBTM  22.2*  21.7*  22.1*   --    --    APTT   --    --   76.5*  126.6*   --    INR  1.88*  1.83*  1.87*   --    --      Recent Labs      17   WBC  5.1           Hemodynamics:  Temp (24hrs), Av.4 °C (97.5 °F), Min:35.7 °C (96.2 °F), Max:37.7 °C (99.9  °F)  Temperature: 36.1 °C (96.9 °F)  Pulse  Av.3  Min: 52  Max: 96   Blood Pressure : 134/55 mmHg     Respiratory:    Respiration: 18, Pulse Oximetry: 95 %           Fluids:    Intake/Output Summary (Last 24 hours) at 17 1345  Last data filed at 17 1117   Gross per 24 hour   Intake    500 ml   Output   2500 ml   Net  -2000 ml     Weight: 52.5 kg (115 lb 11.9 oz)  GI/Nutrition:  Orders Placed This Encounter   Procedures   • DIET ORDER     Standing Status: Standing      Number of Occurrences: 1      Standing Expiration Date:      Order Specific Question:  Diet:     Answer:  Clear Liquids - No Red Foods [12]     Medical Decision Making, by Problem:  Active Hospital Problems    Diagnosis   • Hematochezia [K92.1]   • ESRD (end stage renal disease) on dialysis (CMS-HCC) [N18.6, Z99.2]   • Chronic anticoagulation [Z79.01]   • Essential hypertension [I10]   • Anemia of chronic disease [D63.8]   • Syncope, near [R55]   • Left knee pain [M25.562]       Labs reviewed                      Assessment and Plan  1.ESRD/HD -MWF  2.HTN: BP well conttrolled  3.Electrolytes: well controlled.  4.Anemia:Epogen with HD  5.Volume:better  Recs:  HD MWF  HD today  All meds to renal doses  Diet: renal

## 2017-06-21 NOTE — CARE PLAN
Problem: Safety  Goal: Will remain free from injury  Outcome: PROGRESSING AS EXPECTED  strip alarm in place, pt uses call light appropriately    Problem: Pain Management  Goal: Pain level will decrease to patient’s comfort goal  Outcome: PROGRESSING AS EXPECTED  Medicated and provide comfort measures as needeed

## 2017-06-21 NOTE — PROGRESS NOTES
[facility]    Date of Service: 2017    Chief Complaint  82 y.o. female admitted 2017 with presyncope and BRBPR.    Interval Problem Update  Pt is feeling Ok.  No GI has seen and evaluated the patient.  Denies recurrent GIB.  Nursing noted multiple loose stools.  Just returned from HD.  Awaiting GI.    Consultants/Specialty  Neph and GI.    Disposition          Review of Systems   Respiratory: Negative for shortness of breath.    Cardiovascular: Negative for chest pain.   Gastrointestinal: Positive for diarrhea. Negative for abdominal pain and blood in stool.   Psychiatric/Behavioral: The patient has insomnia.    All other systems reviewed and are negative.     Physical Exam  Laboratory/Imaging   Hemodynamics  Temp (24hrs), Av.5 °C (97.7 °F), Min:35.7 °C (96.2 °F), Max:37.7 °C (99.9 °F)   Temperature: 36.4 °C (97.6 °F)  Pulse  Av.3  Min: 52  Max: 96    Blood Pressure : 146/41 mmHg      Respiratory      Respiration: 18, Pulse Oximetry: 95 %             Fluids  No intake or output data in the 24 hours ending 17 1054    Nutrition  Orders Placed This Encounter   Procedures   • DIET NPO     Standing Status: Standing      Number of Occurrences: 1      Standing Expiration Date:      Order Specific Question:  Restrict to:     Answer:  Sips with Medications [3]     Physical Exam  Nursing note and vitals reviewed.  Constitutional: She is oriented to person, place, and time. She appears well-developed and well-nourished overweight.   HENT:   Head: Normocephalic and atraumatic.   Right Ear: External ear normal.   Left Ear: External ear normal.   Nose: Nose normal.   Eyes: Conjunctivae and extraocular motions are normal.    Neck: Normal range of motion. Neck supple.   Pulmonary/Chest: Effort normal.   Abdominal: Bowel sounds are normal.   Musculoskeletal: Normal range of motion.   Neurological: She is alert and oriented to person, place, and time.   Skin: Skin is warm and dry.              Recent Labs      06/19/17 0219   WBC  5.1   RBC  2.37*   HEMOGLOBIN  7.4*   HEMATOCRIT  24.0*   MCV  101.3*   MCH  31.2   MCHC  30.8*   RDW  55.4*   PLATELETCT  131*   MPV  11.6     Recent Labs      06/19/17 0219   SODIUM  131*   POTASSIUM  4.8   CHLORIDE  99   CO2  29   GLUCOSE  79   BUN  32*   CREATININE  5.99*   CALCIUM  9.3     Recent Labs      06/19/17 0219 06/20/17 0211 06/21/17 0225 06/21/17   0856   APTT   --    --   76.5*  126.6*   INR  1.88*  1.83*  1.87*   --                   Assessment/Plan     Hematochezia (present on admission)  Assessment & Plan  Hgb stable and FOB neg.  Cont Fe replacement.  EGD today.  GI on.      ESRD (end stage renal disease) on dialysis (CMS-HCC) (present on admission)  Assessment & Plan  Just returned from HD.  HD per neph.      Left knee pain (present on admission)  Assessment & Plan  Xray shows arthritic changes and moderate effusion.  Consider tap if no improvement with conservative measures.  Ice pack PRN to knee.      Dementia (present on admission)  Assessment & Plan  Pleasantly demented, appears to be baseline.  Min narc/sed when possible.    Insomnia disorder  Assessment & Plan  Outpatient follow up.    Loose stools  Assessment & Plan  Change laxatives to PRN.    Senile debility  Assessment & Plan  PT/OT and increase activity.    Pancytopenia (CMS-HCC) (present on admission)  Assessment & Plan  Has Fe deficiency.  Cont Fe replacement.    Chronic anticoagulation (present on admission)  Assessment & Plan  Hold Coumadin until after GI eval and bridge c hep gtt.      Medications reviewed and Labs reviewed  [place]aya catheter: No [place]      DVT Prophylaxis: Warfarin (Coumadin)    Ulcer prophylaxis: Not indicated    Assessed for rehab: Patient was assess for and/or received rehabilitation services during this hospitalization      Pulm HTN - outpatient follow up.    Stable issues - med hx (DVT, angina, glaucoma, hypothyroid, HLD, recent influenza  infection), preventives, syncope, HTN.    Dispo - complex/fair.  Await GI rec.

## 2017-06-21 NOTE — PROGRESS NOTES
Hemodialysis ordered by Dr. Najjar. Treatment started at 0817 and ended 1117. Pt stable, vss, no c/o post tx. See flow sheets for details. Report to SONAL Ortega RN. Rt ua avf dressing clean, dry, intact.

## 2017-06-21 NOTE — PROGRESS NOTES
Pt is AAOx4. Pt will be NPO after 0300 for colonoscopy, consent signed and on chart. Colon prep to begin this evening. Pt voids/bm per bed pan. Heparin drip started this evening. APTT due @ 2300, order placed. Max assist to get out of bed. Dialysis MWF, right fistula. SCD's dc'd d/t hx multiple DVT's per MD

## 2017-06-21 NOTE — CARE PLAN
Problem: Safety  Goal: Will remain free from injury  Outcome: PROGRESSING AS EXPECTED  Strip alarm in place, pt uses call light appropriately.    Problem: Pain Management  Goal: Pain level will decrease to patient’s comfort goal  Outcome: PROGRESSING AS EXPECTED  Repositioning and heat packs applied per pts request with noted pain relief

## 2017-06-21 NOTE — CARE PLAN
Problem: Safety  Goal: Will remain free from falls  Outcome: PROGRESSING AS EXPECTED  Fall precautions in place - bed in lowest position, bed alarm is on, call light and personal belongings within reach.     Problem: Knowledge Deficit  Goal: Knowledge of disease process/condition, treatment plan, diagnostic tests, and medications will improve  Intervention: Explain information regarding disease process/condition, treatment plan, diagnostic tests, and medications and document in education  Educated pt of POC, purpose of Moviprep, reasoning for colonoscopy procedure - pt understood

## 2017-06-21 NOTE — PROGRESS NOTES
Gastroenterology Progress Note:    Ting-Marion Ansley  Date & Time note created:    6/21/2017   1:56 PM     Patient ID:  Name:             Skye James  YOB: 1935  Age:                 82 y.o.  female  MRN:               [id number]    Referring MD:  Dr. Edy Stratton                                                             Chief Complaint(s):      Recent large volume lower GI bleeding.    History of Present Illness:    ===Dr. Stein's Consult===  The patient is a very pleasant 82-year-old woman admitted late on June 11th because of passage of bloody stools.  She is a fair historian at best and apparently she passed several bloody stools and 911 was called.  She was admitted late on Sarah the 11th, upon arrival to her room,  she had a syncopal episode and code blue was called.  She denies any rectal    bleeding since then.  She had been admitted here in May for influenza B with superimposed pneumonia.  She was here from 12th to about the 17th of May and from there went to [facility].  Despite trying to get a detailed history from her, I cannot really tell if she came from home or from [facility].  She apparently had some arguing with one of her grandchildren about whether she passed blood from her bowel or from her urine.  The bleeding stopped at the same day and on the 12th, stool came back Hemoccult negative.  She does not  recall any abdominal pain then or now. She said she may have had a COL done at [facility] in 2016, but I was not able to locate the procedure note in our system  ===  6/20/2017 Doing ok. No hematochezia. Willing to proceed with COL.  6/21/2017 Doing fine. The butt is sore. Went to HD early today. When arrived at [facility]. Heparin is still running. Had to reschedule.    Otherwise the patient is doing fine without complaints of fever/chills/weight loss/appetite change/dysphagia/odynophagia/heartburn/nausea/vomiting/bloating/constipation/diarrhea/melena or abdominal pain.    Review of  "Systems:       Constitutional: Denies fevers, weight changes  Eyes: Denies changes in vision, jaundice  Ears/Nose/Throat/Mouth: Denies nasal congestion or sore throat    Cardiovascular: Denies chest pain, Denies palpitations    Respiratory: Denies shortness of breath, denies cough  Gastrointestinal/Hepatic: Denies abdominal pain, nausea, vomiting, diarrhea, constipation. Pos GI bleeding    Genitourinary: Denies dysuria or frequency  Musculoskeletal/Rheum: Denies  joint pain and swelling, edema  Skin: Denies rash  Neurological: Denies headache, confusion, memory loss or focal weakness/parasthesias  Psychiatric: denies mood disorder   Endocrine: Clau thyroid problems  Heme/Oncology/Lymph Nodes: Denies enlarged lymph nodes, denies brusing or known bleeding disorder  All other systems were reviewed and are negative (AMA/CMS criteria)              Past Medical History:   Past Medical History   Diagnosis Date   • GOUT    • other      \"thick blood\"   • Hypertension    • CKD (chronic kidney disease)    • Renal disorder    • Arrhythmia    • Heart murmur    • MEDICAL HOME 4/23/2013   • Arthritis      left knee   • DVT (deep venous thrombosis) (CMS-HCC)      history of DVT 3 times   • CATARACT      gabi surgery  completed   • Preoperative clearance 7/15/2013   • Unspecified hemorrhagic conditions      on coumadin   • Dialysis      M, W, F in [place]   • Pneumonia 5/24/2014   • Other specified disorder of intestines      constipation   • ASTHMA      no meds necessary since moving to [place] from [place]   • Asthma      inhalers as needed   • Anemia    • Dental disorder      dentures   • Falls    • Anginal syndrome (CMS-HCC)    • Glaucoma    • Fall    • Indigestion    • Disorder of thyroid    • Chronic anticoagulation    • Moderate mitral regurgitation    • PAH (pulmonary artery hypertension) - liekly due to mitral regurgitation but with history of thromboembolic disease      Active Hospital Problems    Diagnosis   • Hematochezia " [K92.1]     Priority: High   • ESRD (end stage renal disease) on dialysis (CMS-HCC) [N18.6, Z99.2]     Priority: Medium   • Chronic anticoagulation [Z79.01]     Priority: Low   • Pancytopenia (CMS-HCC) [D61.818]     Priority: Low   • Loose stools [R19.5]   • Senile debility [R54]   • Insomnia disorder [G47.00]   • Dementia [F03.90]   • Left knee pain [M25.562]       Past Surgical History:  Past Surgical History   Procedure Laterality Date   • Tonsillectomy     • Hysterectomy laparoscopy       L side   • Av fistula creation  6/22/2010     Performed by MICHA KIRBY at SURGERY [facility] ORS   • Recovery  4/3/2012     Performed by SURGERY, IR-RECOVERY at SURGERY SAME DAY [facility]   • Cataract phaco with iol  6/20/2012     Performed by LEO RENDON at SURGERY [facility] ORS   • Recovery  11/27/2012     Performed by Ir-Recovery Surgery at SURGERY SAME DAY [facility] ORS   • Recovery  7/16/2013     Performed by Ir-Recovery Surgery at SURGERY SAME DAY [facility] ORS   • Recovery  8/5/2014     Performed by Ir-Recovery Surgery at SURGERY SAME DAY [facility] Medications:  Current Facility-Administered Medications   Medication Dose Frequency Provider Last Rate Last Dose   • NS (BOLUS) infusion 500 mL  500 mL Once PRN Jose Alberto Panchal M.D.       • fentaNYL (SUBLIMAZE) injection 12.5-50 mcg  12.5-50 mcg PRN Jose Alberto Panchal M.D.       • midazolam (VERSED) injection 0.5-2 mg  0.5-2 mg PRN Jose Alberto Panchal M.D.       • peg-electrolyte solution (MOVIPREP) package 100 g  100 g Once Jose Alberto Panchal M.D.       • omeprazole (PRILOSEC) capsule 20 mg  20 mg BID AC Jose Alberto Panchal M.D.   Stopped at 06/21/17 0700   • heparin 1000 units/mL injection 2,200 Units  2,200 Units PRN Edy Stratton M.D.        And   • heparin infusion 25,000 units in 500 ml 0.45% nacl   Continuous Edy Stratton M.D. 14 mL/hr at 06/21/17 1222 700 Units/hr at 06/21/17 1222   • acetaminophen (TYLENOL) tablet 650 mg  650 mg Q4HRS PRN Edy S  NIRALI Stratton       • lactobacillus granules (LACTINEX/FLORANEX) packet 1 Packet  1 Packet TID WITH MEALS Edy Stratton M.D.   Stopped at 06/21/17 0730   • ferrous sulfate tablet 325 mg  325 mg QDAY with Breakfast Edy Stratton M.D.   Stopped at 06/21/17 0730   • losartan (COZAAR) tablet 25 mg  25 mg DAILY Mandy Guzman M.D.   Stopped at 06/21/17 0900   • metoprolol SR (TOPROL XL) tablet 25 mg  25 mg DAILY Mandy Guzman M.D.   Stopped at 06/21/17 0900   • sevelamer (RENAGEL) tablet 800 mg  800 mg BID WITH MEALS Mandy Guzman M.D.   Stopped at 06/21/17 0730   • lidocaine (XYLOCAINE) 1 % solution  1 mL PRN Deonna Mccarthy M.D.   1 mL at 06/14/17 0740   • simvastatin (ZOCOR) tablet 40 mg  40 mg Nightly Mandy Guzman M.D.   40 mg at 06/20/17 2049   • polyethylene glycol/lytes (MIRALAX) PACKET 1 Packet  1 Packet QDAY PRN Mandy Guzman M.D.        And   • magnesium hydroxide (MILK OF MAGNESIA) suspension 30 mL  30 mL QDAY PRN Mandy Guzman M.D.        And   • bisacodyl (DULCOLAX) suppository 10 mg  10 mg QDAY PRN Mandy Guzman M.D.       • ondansetron (ZOFRAN) syringe/vial injection 4 mg  4 mg Q4HRS PRN Mandy Guzman M.D.   4 mg at 06/12/17 0216   • ondansetron (ZOFRAN ODT) dispertab 4 mg  4 mg Q4HRS PRN Mandy Guzman M.D.         Last reviewed on 6/11/2017 11:42 PM by Thomas Sorensen    Current Outpatient Medications:  Prescriptions prior to admission   Medication Sig Dispense Refill Last Dose   • losartan (COZAAR) 25 MG Tab Take 25 mg by mouth every day.   6/11/2017 at 0700   • SENSIPAR 90 MG Tab Take 1 Tab by mouth every day.   6/11/2017 at 0700   • metoprolol SR (TOPROL XL) 25 MG TABLET SR 24 HR Take 25 mg by mouth every day.   6/11/2017 at 0700   • Sevelamer Carbonate 800 MG Tab Take 1 Tab by mouth 2 Times a Day.   6/11/2017 at 0700   • simvastatin (ZOCOR) 40 MG Tab Take 40 mg by mouth every evening.   6/10/2017 at 1930       Medication Allergy:  Allergies   Allergen Reactions   • Nkda [No Known Drug Allergy]   "      Physical Exam:  Weight/BMI: Body mass index is 21.16 kg/(m^2).  Blood pressure 134/55, pulse 59, temperature 36.1 °C (96.9 °F), resp. rate 18, height 1.575 m (5' 2\"), weight 52.5 kg (115 lb 11.9 oz), last menstrual period 05/12/1970, SpO2 95 %.  Filed Vitals:    06/21/17 0400 06/21/17 0730 06/21/17 0817 06/21/17 1117   BP: 152/55 146/41 139/61 134/55   Pulse: 63 58 66 59   Temp: 35.7 °C (96.2 °F) 36.4 °C (97.6 °F) 36.1 °C (97 °F) 36.1 °C (96.9 °F)   Resp: 18 18 18 18   Height:       Weight:       SpO2: 100% 95%       Oxygen Therapy:  Pulse Oximetry: 95 %, O2 (LPM): 3, O2 Delivery: Nasal Cannula    Intake/Output Summary (Last 24 hours) at 06/21/17 1356  Last data filed at 06/21/17 1117   Gross per 24 hour   Intake    500 ml   Output   2500 ml   Net  -2000 ml     Constitutional:   Well developed, well nourished, no acute distress  HEENT:  Normocephalic, Atraumatic, Conjunctiva not pale, Sclera not icteric, Oropharynx moist mucous membranes, No oral exudates, Nose normal.  No thyromegaly.  Neck:  Normal range of motion, No cervical tenderness,  no JVD.  Chest/Lungs:  Symmetric expansion, no spider angioma, breath sounds clear to auscultation bilaterally,  no crackles, no wheezing.   Cardiovascular:  Normal heart rate, Normal rhythm, No murmurs, No rubs, No gallops.    Abdomen: Bowel sounds normal, Soft, No tenderness, No guarding, No rebound, No masses, No hepatosplenomegaly.  Extremities: No cyanosis/clubbing/edema/palmar erythema/flapping tremor. AVF at right upper arm  Skin: Warm, Dry, No erythema, No rash, no induration.    MDM (Data Review):     Records reviewed and summarized in current documentation    Lab Data Review:  Recent Results (from the past 24 hour(s))   APTT    Collection Time: 06/21/17  2:25 AM   Result Value Ref Range    APTT 76.5 (H) 24.7 - 36.0 sec   PROTHROMBIN TIME    Collection Time: 06/21/17  2:25 AM   Result Value Ref Range    PT 22.1 (H) 12.0 - 14.6 sec    INR 1.87 (H) 0.87 - 1.13 "   APTT    Collection Time: 06/21/17  8:56 AM   Result Value Ref Range    APTT 126.6 (HH) 24.7 - 36.0 sec   HEMOGLOBIN AND HEMATOCRIT    Collection Time: 06/21/17 12:27 PM   Result Value Ref Range    Hemoglobin 9.5 (L) 12.0 - 16.0 g/dL    Hematocrit 30.4 (L) 37.0 - 47.0 %         Imaging/Procedures Review:    3/21/17 CT:    1. Diffuse anasarca. No evidence of acute inflammatory change but study is very limited due to nonuse of intravenous contrast.  2. Colonic diverticula.  3. Patchy left lower lobe opacity, atelectasis versus consolidation.  4. Cardiomegaly.    MDM (Assessment and Plan):     Active Hospital Problems    Diagnosis   • Hematochezia [K92.1]     Priority: High   • ESRD (end stage renal disease) on dialysis (CMS-HCC) [N18.6, Z99.2]     Priority: Medium   • Chronic anticoagulation [Z79.01]     Priority: Low   • Pancytopenia (CMS-HCC) [D61.818]     Priority: Low   • Loose stools [R19.5]   • Senile debility [R54]   • Insomnia disorder [G47.00]   • Dementia [F03.90]   • Left knee pain [M25.562]         Assessment  1.  Recent large volume hematochezia, she has diverticulosis on the CAT scan, suspect she had a diverticular bleed.  2.  Diverticulosis on CT scan.  3.  Anticoagulation.  4.  History of deep venous thrombosis.  5.  Chronic renal failure on dialysis.  6.  Anemia of acute bleed on top of anemia of renal disease.  7.  Hospital stay in May for influenza B with superimposed pneumonia.  8.  Hypertension.  9.  Hypothyroidism.  10.  Severe mitral regurg and pulmonary hypertension on an echocardiogram.  11.  Thrombocytopenia.  12.  Asthma.  13.  Painful knee arthritis.  14.  Umbilical hernia.    Plan  1.  PPI BIDAC  2.  Resume Cl liq diet.  3.  NPO after midnight  4.  Monitor H/H and vitals. Serial hemoglobin and hematocrit q. 6 hours with transfusions as needed for hemoglobin less than 7 or hematocrit less than 21 (I would recommend transfuse 1 unit for these cut offs).  5.  Hospitalist to manage comorbid  conditions.  6.  Colonoscopy with possible hemostasis tomorrow with Dr. Coemr.  7.  Dr. Comer will follow the patient.  8.  Hold Heparin 6 hours prior to colonoscopy.    Thank you very much for allowing me to participate in the care of your patient.  Please feel free to contact me anytime at [phone].     Jose Alberto Panchal M.D.

## 2017-06-22 PROBLEM — I10 HTN (HYPERTENSION): Status: ACTIVE | Noted: 2017-06-22

## 2017-06-22 PROBLEM — M25.562 LEFT KNEE PAIN: Status: ACTIVE | Noted: 2017-06-13

## 2017-06-22 LAB
APTT PPP: 74.6 SEC (ref 24.7–36)
HCT VFR BLD AUTO: 23.9 % (ref 37–47)
HCT VFR BLD AUTO: 27.7 % (ref 37–47)
HCT VFR BLD AUTO: 29.7 % (ref 37–47)
HGB BLD-MCNC: 7.5 G/DL (ref 12–16)
HGB BLD-MCNC: 8.6 G/DL (ref 12–16)
HGB BLD-MCNC: 9.3 G/DL (ref 12–16)
INR PPP: 1.85 (ref 0.87–1.13)
PROTHROMBIN TIME: 21.9 SEC (ref 12–14.6)

## 2017-06-22 PROCEDURE — 770006 HCHG ROOM/CARE - MED/SURG/GYN SEMI*

## 2017-06-22 PROCEDURE — 99153 MOD SED SAME PHYS/QHP EA: CPT | Performed by: INTERNAL MEDICINE

## 2017-06-22 PROCEDURE — 700102 HCHG RX REV CODE 250 W/ 637 OVERRIDE(OP): Performed by: HOSPITALIST

## 2017-06-22 PROCEDURE — 160002 HCHG RECOVERY MINUTES (STAT): Performed by: INTERNAL MEDICINE

## 2017-06-22 PROCEDURE — A9270 NON-COVERED ITEM OR SERVICE: HCPCS | Performed by: INTERNAL MEDICINE

## 2017-06-22 PROCEDURE — 99152 MOD SED SAME PHYS/QHP 5/>YRS: CPT | Performed by: INTERNAL MEDICINE

## 2017-06-22 PROCEDURE — 0DJD8ZZ INSPECTION OF LOWER INTESTINAL TRACT, VIA NATURAL OR ARTIFICIAL OPENING ENDOSCOPIC: ICD-10-PCS | Performed by: INTERNAL MEDICINE

## 2017-06-22 PROCEDURE — A9270 NON-COVERED ITEM OR SERVICE: HCPCS | Performed by: HOSPITALIST

## 2017-06-22 PROCEDURE — 501629 HCHG TUBE, LUKI TRAP STERILE DISP: Performed by: INTERNAL MEDICINE

## 2017-06-22 PROCEDURE — 502240 HCHG MISC OR SUPPLY RC 0272: Performed by: INTERNAL MEDICINE

## 2017-06-22 PROCEDURE — 85018 HEMOGLOBIN: CPT | Mod: 91

## 2017-06-22 PROCEDURE — 700111 HCHG RX REV CODE 636 W/ 250 OVERRIDE (IP)

## 2017-06-22 PROCEDURE — 97530 THERAPEUTIC ACTIVITIES: CPT

## 2017-06-22 PROCEDURE — 160035 HCHG PACU - 1ST 60 MINS PHASE I: Performed by: INTERNAL MEDICINE

## 2017-06-22 PROCEDURE — 160048 HCHG OR STATISTICAL LEVEL 1-5: Performed by: INTERNAL MEDICINE

## 2017-06-22 PROCEDURE — 700102 HCHG RX REV CODE 250 W/ 637 OVERRIDE(OP)

## 2017-06-22 PROCEDURE — 85610 PROTHROMBIN TIME: CPT

## 2017-06-22 PROCEDURE — 88305 TISSUE EXAM BY PATHOLOGIST: CPT

## 2017-06-22 PROCEDURE — 97110 THERAPEUTIC EXERCISES: CPT

## 2017-06-22 PROCEDURE — 85730 THROMBOPLASTIN TIME PARTIAL: CPT

## 2017-06-22 PROCEDURE — 36415 COLL VENOUS BLD VENIPUNCTURE: CPT

## 2017-06-22 PROCEDURE — 700102 HCHG RX REV CODE 250 W/ 637 OVERRIDE(OP): Performed by: INTERNAL MEDICINE

## 2017-06-22 PROCEDURE — A9270 NON-COVERED ITEM OR SERVICE: HCPCS

## 2017-06-22 PROCEDURE — 0DBM8ZZ EXCISION OF DESCENDING COLON, VIA NATURAL OR ARTIFICIAL OPENING ENDOSCOPIC: ICD-10-PCS | Performed by: INTERNAL MEDICINE

## 2017-06-22 PROCEDURE — 85014 HEMATOCRIT: CPT | Mod: 91

## 2017-06-22 PROCEDURE — 160203 HCHG ENDO MINUTES - 1ST 30 MINS LEVEL 4: Performed by: INTERNAL MEDICINE

## 2017-06-22 PROCEDURE — 99233 SBSQ HOSP IP/OBS HIGH 50: CPT | Performed by: HOSPITALIST

## 2017-06-22 RX ORDER — MIDAZOLAM HYDROCHLORIDE 1 MG/ML
INJECTION INTRAMUSCULAR; INTRAVENOUS
Status: DISCONTINUED | OUTPATIENT
Start: 2017-06-22 | End: 2017-06-22 | Stop reason: HOSPADM

## 2017-06-22 RX ORDER — L. ACIDOPHILUS/L.BULGARICUS 100MM CELL
2 GRANULES IN PACKET (EA) ORAL
Status: DISCONTINUED | OUTPATIENT
Start: 2017-06-22 | End: 2017-06-23 | Stop reason: HOSPADM

## 2017-06-22 RX ORDER — MIDAZOLAM HYDROCHLORIDE 1 MG/ML
.5-2 INJECTION INTRAMUSCULAR; INTRAVENOUS PRN
Status: ACTIVE | OUTPATIENT
Start: 2017-06-22 | End: 2017-06-22

## 2017-06-22 RX ORDER — WARFARIN SODIUM 2.5 MG/1
2.5 TABLET ORAL
Status: DISCONTINUED | OUTPATIENT
Start: 2017-06-22 | End: 2017-06-23 | Stop reason: HOSPADM

## 2017-06-22 RX ORDER — SODIUM CHLORIDE 9 MG/ML
500 INJECTION, SOLUTION INTRAVENOUS
Status: ACTIVE | OUTPATIENT
Start: 2017-06-22 | End: 2017-06-22

## 2017-06-22 RX ADMIN — RENAGEL 800 MG: 800 TABLET ORAL at 17:51

## 2017-06-22 RX ADMIN — METOPROLOL SUCCINATE 25 MG: 25 TABLET, EXTENDED RELEASE ORAL at 15:01

## 2017-06-22 RX ADMIN — LACTOBACILLUS ACIDOPHILUS / LACTOBACILLUS BULGARICUS 2 PACKET: 100 MILLION CFU STRENGTH GRANULES at 15:04

## 2017-06-22 RX ADMIN — LOSARTAN POTASSIUM 25 MG: 50 TABLET, FILM COATED ORAL at 15:05

## 2017-06-22 RX ADMIN — RENAGEL 800 MG: 800 TABLET ORAL at 15:01

## 2017-06-22 RX ADMIN — WARFARIN SODIUM 2.5 MG: 2.5 TABLET ORAL at 17:50

## 2017-06-22 RX ADMIN — OMEPRAZOLE 20 MG: 20 CAPSULE, DELAYED RELEASE ORAL at 15:01

## 2017-06-22 RX ADMIN — SIMVASTATIN 40 MG: 20 TABLET, FILM COATED ORAL at 20:53

## 2017-06-22 RX ADMIN — Medication 325 MG: at 15:01

## 2017-06-22 ASSESSMENT — PATIENT HEALTH QUESTIONNAIRE - PHQ9
SUM OF ALL RESPONSES TO PHQ QUESTIONS 1-9: 0
SUM OF ALL RESPONSES TO PHQ9 QUESTIONS 1 AND 2: 0
1. LITTLE INTEREST OR PLEASURE IN DOING THINGS: NOT AT ALL
2. FEELING DOWN, DEPRESSED, IRRITABLE, OR HOPELESS: NOT AT ALL

## 2017-06-22 ASSESSMENT — PAIN SCALES - GENERAL
PAINLEVEL_OUTOF10: 2
PAINLEVEL_OUTOF10: 1

## 2017-06-22 ASSESSMENT — COGNITIVE AND FUNCTIONAL STATUS - GENERAL
MOVING FROM LYING ON BACK TO SITTING ON SIDE OF FLAT BED: A LOT
MOBILITY SCORE: 11
STANDING UP FROM CHAIR USING ARMS: A LOT
MOVING TO AND FROM BED TO CHAIR: A LOT
TURNING FROM BACK TO SIDE WHILE IN FLAT BAD: A LITTLE
WALKING IN HOSPITAL ROOM: TOTAL
CLIMB 3 TO 5 STEPS WITH RAILING: TOTAL
SUGGESTED CMS G CODE MODIFIER MOBILITY: CL

## 2017-06-22 ASSESSMENT — GAIT ASSESSMENTS: GAIT LEVEL OF ASSIST: UNABLE TO PARTICIPATE

## 2017-06-22 NOTE — PROGRESS NOTES
[facility] Hospitalist Progress Note    Date of Service: 2017    Chief Complaint  82 y.o. female admitted 2017 with presyncope and BRBPR.    Interval Problem Update  Just returned from EGD.  Remains very somnolent.      Consultants/Specialty  Neph and GI.    Disposition          Review of Systems   Unable to perform ROS: medical condition      Physical Exam  Laboratory/Imaging   Hemodynamics  Temp (24hrs), Av.3 °C (97.4 °F), Min:36.2 °C (97.2 °F), Max:36.4 °C (97.6 °F)   Temperature: 36.4 °C (97.5 °F)  Pulse  Av.4  Min: 52  Max: 105 Heart Rate (Monitored): 68  Blood Pressure : 138/47 mmHg, NIBP: 140/53 mmHg      Respiratory      Respiration: 13, Pulse Oximetry: 100 %             Fluids  No intake or output data in the 24 hours ending 17 1330    Nutrition  Orders Placed This Encounter   Procedures   • DIET NPO     Standing Status: Standing      Number of Occurrences: 1      Standing Expiration Date:      Order Specific Question:  Restrict to:     Answer:  Sips with Medications [3]     Physical Exam  Nursing note and vitals reviewed.  Constitutional: She is asleep and difficult to wake. She appears well-developed,  well-nourished and overweight.   HENT:   Head: Normocephalic and atraumatic.   Right Ear: External ear normal.   Left Ear: External ear normal.   Nose: Nose normal.   Eyes: Conjunctivae and extraocular motions are normal.    Neck: Normal range of motion. Neck supple.   Pulmonary/Chest: Effort normal.   Abdominal: Bowel sounds are normal.   Musculoskeletal: Normal range of motion.   Skin: Skin is warm and dry.             Recent Labs      17   1900  17   0150  17   0850   HEMOGLOBIN  7.5*  7.5*  8.6*   HEMATOCRIT  23.9*  23.9*  27.7*         Recent Labs      17   0211   17   0225  17   0856  17   1910  17   0150   APTT   --    < >  76.5*  126.6*  >240.0*  74.6*   INR  1.83*   --   1.87*   --    --   1.85*    < > = values in this interval  not displayed.                  Assessment/Plan     Hematochezia (present on admission)  Assessment & Plan  Hgb stable and FOB neg.  Cont Fe replacement.  Await EGD GI eval.      ESRD (end stage renal disease) on dialysis (CMS-HCC) (present on admission)  Assessment & Plan  HD per neph.      Left knee pain (present on admission)  Assessment & Plan  Xray shows arthritic changes and moderate effusion.  Consider tap if no improvement with conservative measures.  Ice pack PRN to knee.      Dementia (present on admission)  Assessment & Plan  Pleasantly demented, appears to be baseline.  Min narc/sed when possible.    Insomnia disorder  Assessment & Plan  Outpatient follow up.    Loose stools  Assessment & Plan  Likely from GI prep.  Increase Lactinex.  Laxatives to PRN and follow.     Senile debility  Assessment & Plan  PT/OT and increase activity.    HTN (hypertension)  Assessment & Plan  Labile.  CCR and follow.    Pancytopenia (CMS-HCC) (present on admission)  Assessment & Plan  Has Fe deficiency.  Cont Fe replacement.    Chronic anticoagulation (present on admission)  Assessment & Plan  Resume Coumadin per pharm protocol and DC hep gtt.      Medications reviewed and Labs reviewed  Morataya catheter: No Morataya      DVT Prophylaxis: Warfarin (Coumadin)    Ulcer prophylaxis: Not indicated    Assessed for rehab: Patient was assess for and/or received rehabilitation services during this hospitalization      Pulm HTN - outpatient follow up.    Stable issues - med hx (DVT, angina, glaucoma, hypothyroid, HLD, recent influenza infection), preventives, syncope, HTN.    Dispo - complex/fair.  Await GI rec.

## 2017-06-22 NOTE — PROGRESS NOTES
Patient is AOx4, appears fatigued, generalized weakness present, heparin drip verified at change of shift, call light within reach, hourly rounding, bed alarm active.

## 2017-06-22 NOTE — OR SURGEON
Immediate Post-Operative Note      PreOp Diagnosis: Recent lower GI bleed    PostOp Diagnosis:   1. Pan-colonic diverticulosis.  2. 5 mm polyp in descending colon at 58 cm, cold snared, partially retrieved.    Procedure(s):  COLONOSCOPY - ENDO - Wound Class: Clean Contaminated    Surgeon(s):  Jeff Comer M.D.    Anesthesiologist/Type of Anesthesia:  No anesthesia staff entered./Sedation    Surgical Staff:  Endoscopy Technician: Luciano Aguilar  Sedation/Monitoring Nurse: Barbara Rios R.N.    Specimen: Descending colon polyp    Estimated Blood Loss: None    Findings: As above    Complications: None    Impression:   Most likely she recently had a diverticular bleed, but has no signs of blood now.    Recommend: High fiber diet, I will contact her with pathology results, signing off.        6/22/2017 9:47 AM Jeff Comer

## 2017-06-22 NOTE — PROCEDURES
DATE OF SERVICE:  06/22/2017    PREPROCEDURE DIAGNOSIS:  Recent large volume lower gastrointestinal bleed.    POSTPROCEDURE DIAGNOSES:  1.  Pancolonic diverticulosis.  2.  A 5 mm polyp in the descending colon at 58 cm, cold snared, partially   retrieved.    PROCEDURE:  Colonoscopy to the cecum with polypectomy x1.    DESCRIPTION OF PROCEDURE:  The risks, benefits, and alternatives were   explained to the patient and consent obtained.  She was brought to the   endoscopy suite where a timeout was performed.  She was placed on her left   side and sedated with fentanyl and Versed (see nursing notes for details).    Digital rectal exam revealed decreased sphincter tone and no masses.  The   Olympus flexible video colonoscope was introduced in the rectum and advanced   under direct visualization to the cecum identified by appendiceal orifice and   ileocecal valve.  The bowel prep was good.  No blood was seen throughout the   entire colon.  There were a few diverticula in the cecum.  There were   diverticula scattered throughout the transverse colon, descending colon and   sigmoid colon.  In the descending colon at 58 cm, there was a 5 mm sessile   polyp removed with cold snare technique.  The polyp rapidly slipped out of   view and although I searched carefully, I did not find the main polyp.  I did   receive some fragments in the suction trap, which were sent for   histopathology.  Upon reaching the rectum, the scope was retroflexed.  No   lesions were noted.  Air was evacuated and scope withdrawn.  The patient   tolerated the procedure well and there were no complications.    IMPRESSION:  I suspect she recently had a diverticular bleed.  There are no   signs of bleeding now.    RECOMMENDATIONS:  1.  High fiber diet.  2.  I will contact her with the pathology results and recommended followup.    Given her age, she probably would not be a candidate for surveillance   colonoscopy in the future, but we would follow her as  needed.       ____________________________________     JIMMY CHUN MD CMS / [facility]    DD:  06/22/2017 09:51:53  DT:  06/22/2017 10:20:35    D#:  [id number]  Job#:  [id number]

## 2017-06-22 NOTE — PROGRESS NOTES
Plan to have colonoscopy tomorrow at 0900. Heparin drip to be held at 0300. HD today, 2 liters pulled. IV left FA, heparin drip infusing. Next aptt @ 1830. Needs frequent assistance with bed pan and repositioning. 02 @ 3.

## 2017-06-22 NOTE — CARE PLAN
Problem: Safety  Goal: Will remain free from injury  Outcome: PROGRESSING SLOWER THAN EXPECTED  Decreased mobility noted. Continue with therapy as ordered. Strip alarm in place, family at bedside, call light with in reach     Problem: Pain Management  Goal: Pain level will decrease to patient’s comfort goal  Medicate as needed and reposition frequently

## 2017-06-22 NOTE — PROGRESS NOTES
Nephrology Progress Note, Adult, Complex               Author:Fadi Najjar Date & Time created: 2017  12:45 PM     Interval History:  81 y/o female with ESRD/HD admitted with anemia, recta; bleeding, vivina, syncopal episode  Pt is lethargic post colonoscopy    Review of Systems:  Review of Systems   Unable to perform ROS: mental acuity       Physical Exam:  Physical Exam   Constitutional: She appears lethargic. She appears cachectic. No distress.   HENT:   Head: Normocephalic and atraumatic.   Mouth/Throat: Oropharynx is clear and moist.   Eyes: No scleral icterus.   Cardiovascular: Normal rate and regular rhythm.    Pulmonary/Chest: Effort normal and breath sounds normal. No respiratory distress. She has no wheezes.   Musculoskeletal: She exhibits no edema.   Neurological: She appears lethargic.   Nursing note and vitals reviewed.      Labs:        Invalid input(s): [id number]      No results for input(s): SODIUM, POTASSIUM, CHLORIDE, CO2, BUN, CREATININE, MAGNESIUM, PHOSPHORUS, CALCIUM in the last 72 hours.  No results for input(s): ALTSGPT, ASTSGOT, ALKPHOSPHAT, TBILIRUBIN, DBILIRUBIN, GAMMAGT, AMYLASE, LIPASE, ALB, PREALBUMIN, GLUCOSE in the last 72 hours.  Recent Labs      17   0211   17   0225  17   0856   17   1900  17   1910  17   0150  17   0850   HEMOGLOBIN   --    --    --    --    < >  7.5*   --   7.5*  8.6*   HEMATOCRIT   --    --    --    --    < >  23.9*   --   23.9*  27.7*   PROTHROMBTM  21.7*   --   22.1*   --    --    --    --   21.9*   --    APTT   --    < >  76.5*  126.6*   --    --   >240.0*  74.6*   --    INR  1.83*   --   1.87*   --    --    --    --   1.85*   --     < > = values in this interval not displayed.               Hemodynamics:  Temp (24hrs), Av.3 °C (97.4 °F), Min:36.2 °C (97.2 °F), Max:36.4 °C (97.6 °F)  Temperature: 36.4 °C (97.5 °F)  Pulse  Av.4  Min: 52  Max: 105Heart Rate (Monitored): 68  Blood Pressure : 138/47 mmHg,  NIBP: 140/53 mmHg     Respiratory:    Respiration: 13, Pulse Oximetry: 100 %           Fluids:  No intake or output data in the 24 hours ending 06/22/17 1245     GI/Nutrition:  Orders Placed This Encounter   Procedures   • DIET NPO     Standing Status: Standing      Number of Occurrences: 1      Standing Expiration Date:      Order Specific Question:  Restrict to:     Answer:  Sips with Medications [3]     Medical Decision Making, by Problem:  Active Hospital Problems    Diagnosis   • Hematochezia [K92.1]   • ESRD (end stage renal disease) on dialysis (CMS-HCC) [N18.6, Z99.2]   • Chronic anticoagulation [Z79.01]   • Essential hypertension [I10]   • Anemia of chronic disease [D63.8]   • Syncope, near [R55]   • Left knee pain [M25.562]       Labs reviewed                      Assessment and Plan  1.ESRD/HD -MWF  2.HTN: BP well conttrolled  3.Electrolytes: well controlled.  4.Anemia:Epogen with HD  5.Volume:better  Recs:  No need for HD today  All meds to renal doses  Diet: renal

## 2017-06-22 NOTE — THERAPY
"Physical Therapy Treatment completed.   Bed Mobility:  Supine to Sit: Minimal Assist  Transfers: Sit to Stand: Moderate Assist  Gait: Level Of Assist: Unable to Participate        Plan of Care: Will benefit from Physical Therapy 3 times per week  Discharge Recommendations: Equipment: Will Continue to Assess for Equipment Needs. Post-acute therapy Discharge to a transitional care facility for continued skilled therapy services.     See \"Rehab Therapy-Acute\" Patient Summary Report for complete documentation.       "

## 2017-06-23 ENCOUNTER — APPOINTMENT (OUTPATIENT)
Dept: MEDICAL GROUP | Facility: PHYSICIAN GROUP | Age: 82
End: 2017-06-23
Payer: MEDICARE

## 2017-06-23 VITALS
RESPIRATION RATE: 18 BRPM | BODY MASS INDEX: 21.3 KG/M2 | SYSTOLIC BLOOD PRESSURE: 117 MMHG | WEIGHT: 115.74 LBS | TEMPERATURE: 98.6 F | DIASTOLIC BLOOD PRESSURE: 34 MMHG | HEIGHT: 62 IN | HEART RATE: 66 BPM | OXYGEN SATURATION: 100 %

## 2017-06-23 LAB
HCT VFR BLD AUTO: 25.5 % (ref 37–47)
HGB BLD-MCNC: 8 G/DL (ref 12–16)
INR PPP: 1.76 (ref 0.87–1.13)
PROTHROMBIN TIME: 21.1 SEC (ref 12–14.6)

## 2017-06-23 PROCEDURE — 700102 HCHG RX REV CODE 250 W/ 637 OVERRIDE(OP): Performed by: INTERNAL MEDICINE

## 2017-06-23 PROCEDURE — 90935 HEMODIALYSIS ONE EVALUATION: CPT

## 2017-06-23 PROCEDURE — 85018 HEMOGLOBIN: CPT

## 2017-06-23 PROCEDURE — A9270 NON-COVERED ITEM OR SERVICE: HCPCS | Performed by: HOSPITALIST

## 2017-06-23 PROCEDURE — A9270 NON-COVERED ITEM OR SERVICE: HCPCS | Performed by: INTERNAL MEDICINE

## 2017-06-23 PROCEDURE — 99239 HOSP IP/OBS DSCHRG MGMT >30: CPT | Performed by: HOSPITALIST

## 2017-06-23 PROCEDURE — 36415 COLL VENOUS BLD VENIPUNCTURE: CPT

## 2017-06-23 PROCEDURE — 700102 HCHG RX REV CODE 250 W/ 637 OVERRIDE(OP): Performed by: HOSPITALIST

## 2017-06-23 PROCEDURE — 85610 PROTHROMBIN TIME: CPT

## 2017-06-23 PROCEDURE — 85014 HEMATOCRIT: CPT

## 2017-06-23 RX ORDER — WARFARIN SODIUM 2.5 MG/1
2.5 TABLET ORAL
Qty: 30 TAB | Refills: 3
Start: 2017-06-23 | End: 2017-06-28

## 2017-06-23 RX ORDER — L. ACIDOPHILUS/L.BULGARICUS 100MM CELL
2 GRANULES IN PACKET (EA) ORAL
Start: 2017-06-23 | End: 2017-06-28

## 2017-06-23 RX ORDER — HYDRALAZINE HYDROCHLORIDE 10 MG/1
10 TABLET, FILM COATED ORAL EVERY 8 HOURS
Status: DISCONTINUED | OUTPATIENT
Start: 2017-06-23 | End: 2017-06-23 | Stop reason: HOSPADM

## 2017-06-23 RX ORDER — FERROUS SULFATE 325(65) MG
325 TABLET ORAL
Qty: 30 TAB
Start: 2017-06-23 | End: 2017-07-17

## 2017-06-23 RX ORDER — LIDOCAINE HYDROCHLORIDE 10 MG/ML
1 INJECTION, SOLUTION INFILTRATION; PERINEURAL PRN
Refills: 0
Start: 2017-06-23 | End: 2017-06-28

## 2017-06-23 RX ORDER — ISOSORBIDE DINITRATE 10 MG/1
10 TABLET ORAL 3 TIMES DAILY
Status: DISCONTINUED | OUTPATIENT
Start: 2017-06-23 | End: 2017-06-23 | Stop reason: HOSPADM

## 2017-06-23 RX ORDER — ACETAMINOPHEN 325 MG/1
650 TABLET ORAL EVERY 6 HOURS PRN
Start: 2017-06-23 | End: 2017-06-28

## 2017-06-23 RX ORDER — ISOSORBIDE DINITRATE 10 MG/1
10 TABLET ORAL 3 TIMES DAILY
Qty: 90 TAB
Start: 2017-06-23

## 2017-06-23 RX ORDER — OMEPRAZOLE 20 MG/1
20 CAPSULE, DELAYED RELEASE ORAL
Qty: 30 CAP
Start: 2017-06-23

## 2017-06-23 RX ORDER — HYDRALAZINE HYDROCHLORIDE 10 MG/1
10 TABLET, FILM COATED ORAL EVERY 8 HOURS
Qty: 90 TAB
Start: 2017-06-23

## 2017-06-23 RX ADMIN — LACTOBACILLUS ACIDOPHILUS / LACTOBACILLUS BULGARICUS 2 PACKET: 100 MILLION CFU STRENGTH GRANULES at 12:15

## 2017-06-23 RX ADMIN — OMEPRAZOLE 20 MG: 20 CAPSULE, DELAYED RELEASE ORAL at 05:21

## 2017-06-23 RX ADMIN — METOPROLOL SUCCINATE 25 MG: 25 TABLET, EXTENDED RELEASE ORAL at 12:15

## 2017-06-23 RX ADMIN — RENAGEL 800 MG: 800 TABLET ORAL at 12:16

## 2017-06-23 RX ADMIN — LOSARTAN POTASSIUM 25 MG: 50 TABLET, FILM COATED ORAL at 12:15

## 2017-06-23 RX ADMIN — Medication 325 MG: at 12:20

## 2017-06-23 ASSESSMENT — ENCOUNTER SYMPTOMS
FEVER: 0
NAUSEA: 0
VOMITING: 0
CHILLS: 0

## 2017-06-23 NOTE — PROGRESS NOTES
Pt d/c'd to [facility] per md order. Pt alert and oriented x4. Pt aware of d/c.  Discussed d/c instructions with patient.  Iv removed.  Pt taken by transport via gurney, with belongings.

## 2017-06-23 NOTE — PROGRESS NOTES
Received pt from .  Pt alert and oriented. No complaints of pain.  VSS.  Discussed with pt that she will be d/c'd to [facility] at 1400 today.

## 2017-06-23 NOTE — PROGRESS NOTES
Pt dialyzed for 3 hrs today from 0806 to 1106.  Pt tolerated tx well.  Net UF 1.5L. VSS throughout tx. 10 mins to achieve hemostasis and dry gauze dsgs applied to AVF sites.  See dialysis flow sheet for details.  Report called to Roslyn Be RN.

## 2017-06-23 NOTE — DISCHARGE PLANNING
Pt case discussed in morning rounds. Pt accepted to [facility]. Pt medically cleared to DC today. Transport  form faxed to [facility]. MD will dictated Dc summary. Await transport time.

## 2017-06-23 NOTE — PROGRESS NOTES
Inpatient Anticoagulation Service Note    Date: 6/22/2017  Reason for Anticoagulation: Deep Vein Thrombosis (chronic)      Hemoglobin Value: 8.6  Hematocrit Value: 27.7  Lab Platelet Value: 131  Target INR: 2.0 to 3.0    INR from last 7 days     Date/Time INR Value    06/22/17 0150 (!)1.85    06/21/17 0225 (!)1.87    06/20/17 0211 (!)1.83    06/19/17 0219 (!)1.88    06/18/17 0439 (!)1.89    06/17/17 0407 (!)1.97    06/16/17 0233 (!)2.29        Dose from last 7 days     Date/Time Dose (mg)    06/22/17 1700 2.5    06/19/17 1300 2.5    06/18/17 1200 2    06/17/17 1000 1    06/16/17 1500 0.5        Average Dose (mg):  (TBD; intermittent refusal at previous care facility)  Significant Interactions: Statin, Proton Pump Inhibitor (ferrous sulfate)  Bridge Therapy: No      Reversal Agent Administered: Not Applicable  Comments: Sub-therapeutic INR as expected with warfarin on hold for past 3 days.  Pt had hematochezia,  now s/p colonoscopy.  H/H stable. Ok to resume warfarin per pharmacy dosing and bridge therapy with heparin drip. Warfarin-drug interactions as stated above. Begin warfarin 2.5mg po qday.  Trend INR daily.     Plan:  Begin warfarin 2.5mg po qday.  Trend INR daily.   Education Material Provided?: No  Pharmacist suggested discharge dosing: To be determined pending future INR results.  May consider warfarin 2.5mg po qday with follow up INR within 24-48 hours of discharge.      Thalia Diaz, PharmD

## 2017-06-23 NOTE — PROGRESS NOTES
Patient is asleep, appears to be comfortable, no signs of distress.  Heparin gtt no longer running.  No other acute change noted on patient.  Call light within reach, hourly rounding, bed alarm active.

## 2017-06-23 NOTE — DISCHARGE PLANNING
Received transport form from Martha(DINAH). Arranged patients transport to [facility] at 1400 via [facility]. Martha(DINAH) and Lidia(RSKELLEE) notified of transport time.    Task ID#[id number]

## 2017-06-23 NOTE — DISCHARGE INSTRUCTIONS
Discharge Instructions    [facility]. Discharged via ambulance, hospital escort: Yes.  Special equipment needed: Oxygen    Be sure to schedule a follow-up appointment with your primary care doctor or any specialists as instructed.     Discharge Plan:   Influenza Vaccine Indication: Patient Refuses    I understand that a diet low in cholesterol, fat, and sodium is recommended for good health. Unless I have been given specific instructions below for another diet, I accept this instruction as my diet prescription.   Other diet: Renal    Special Instructions: None    · Is patient discharged on Warfarin / Coumadin?   Yes    You are receiving the drug warfarin. Please understand the importance of monitoring warfarin with scheduled PT/INR blood draws.  Follow-up with the [facility] in one week for INR lab..    IMPORTANT: HOW TO USE THIS INFORMATION:  This is a summary and does NOT have all possible information about this product. This information does not assure that this product is safe, effective, or appropriate for you. This information is not individual medical advice and does not substitute for the advice of your health care professional. Always ask your health care professional for complete information about this product and your specific health needs.      WARFARIN - ORAL (WARF-uh-rin)      COMMON BRAND NAME(S): Coumadin      WARNING:  Warfarin can cause very serious (possibly fatal) bleeding. This is more likely to occur when you first start taking this medication or if you take too much warfarin. To decrease your risk for bleeding, your doctor or other health care provider will monitor you closely and check your lab results (INR test) to make sure you are not taking too much warfarin. Keep all medical and laboratory appointments. Tell your doctor right away if you notice any signs of serious bleeding. See also Side Effects section.      USES:  This medication is used to  "treat blood clots (such as in deep vein thrombosis-DVT or pulmonary embolus-PE) and/or to prevent new clots from forming in your body. Preventing harmful blood clots helps to reduce the risk of a stroke or heart attack. Conditions that increase your risk of developing blood clots include a certain type of irregular heart rhythm (atrial fibrillation), heart valve replacement, recent heart attack, and certain surgeries (such as hip/knee replacement). Warfarin is commonly called a \"blood thinner,\" but the more correct term is \"anticoagulant.\" It helps to keep blood flowing smoothly in your body by decreasing the amount of certain substances (clotting proteins) in your blood.      HOW TO USE:  Read the Medication Guide provided by your pharmacist before you start taking warfarin and each time you get a refill. If you have any questions, ask your doctor or pharmacist. Take this medication by mouth with or without food as directed by your doctor or other health care professional, usually once a day. It is very important to take it exactly as directed. Do not increase the dose, take it more frequently, or stop using it unless directed by your doctor. Dosage is based on your medical condition, laboratory tests (such as INR), and response to treatment. Your doctor or other health care provider will monitor you closely while you are taking this medication to determine the right dose for you. Use this medication regularly to get the most benefit from it. To help you remember, take it at the same time each day. It is important to eat a balanced, consistent diet while taking warfarin. Some foods can affect how warfarin works in your body and may affect your treatment and dose. Avoid sudden large increases or decreases in your intake of foods high in vitamin K (such as broccoli, cauliflower, cabbage, brussels sprouts, kale, spinach, and other green leafy vegetables, liver, green tea, certain vitamin supplements). If you are " trying to lose weight, check with your doctor before you try to go on a diet. Cranberry products may also affect how your warfarin works. Limit the amount of cranberry juice (16 ounces/480 milliliters a day) or other cranberry products you may drink or eat.      SIDE EFFECTS:  Nausea, loss of appetite, or stomach/abdominal pain may occur. If any of these effects persist or worsen, tell your doctor or pharmacist promptly. Remember that your doctor has prescribed this medication because he or she has judged that the benefit to you is greater than the risk of side effects. Many people using this medication do not have serious side effects. This medication can cause serious bleeding if it affects your blood clotting proteins too much (shown by unusually high INR lab results). Even if your doctor stops your medication, this risk of bleeding can continue for up to a week. Tell your doctor right away if you have any signs of serious bleeding, including: unusual pain/swelling/discomfort, unusual/easy bruising, prolonged bleeding from cuts or gums, persistent/frequent nosebleeds, unusually heavy/prolonged menstrual flow, pink/dark urine, coughing up blood, vomit that is bloody or looks like coffee grounds, severe headache, dizziness/fainting, unusual or persistent tiredness/weakness, bloody/black/tarry stools, chest pain, shortness of breath, difficulty swallowing. Tell your doctor right away if any of these unlikely but serious side effects occur: persistent nausea/vomiting, severe stomach/abdominal pain, yellowing eyes/skin. This drug rarely has caused very serious (possibly fatal) problems if its effects lead to small blood clots (usually at the beginning of treatment). This can lead to severe skin/tissue damage that may require surgery or amputation if left untreated. Patients with certain blood conditions (protein C or S deficiency) may be at greater risk. Get medical help right away if any of these rare but serious  side effects occur: painful/red/purplish patches on the skin (such as on the toe, breast, abdomen), change in the amount of urine, vision changes, confusion, slurred speech, weakness on one side of the body. A very serious allergic reaction to this drug is rare. However, get medical help right away if you notice any symptoms of a serious allergic reaction, including: rash, itching/swelling (especially of the face/tongue/throat), severe dizziness, trouble breathing. This is not a complete list of possible side effects. If you notice other effects not listed above, contact your doctor or pharmacist. In the [place] - Call your doctor for medical advice about side effects. You may report side effects to FDA at 2-703-OBY-1192. In [place] - Call your doctor for medical advice about side effects. You may report side effects to [facility] [place] at 1-206.787.8248.      PRECAUTIONS:  Before taking warfarin, tell your doctor or pharmacist if you are allergic to it; or if you have any other allergies. This product may contain inactive ingredients, which can cause allergic reactions or other problems. Talk to your pharmacist for more details. Before using this medication, tell your doctor or pharmacist your medical history, especially of: blood disorders (such as anemia, hemophilia), bleeding problems (such as bleeding of the stomach/intestines, bleeding in the brain), blood vessel disorders (such as aneurysms), recent major injury/surgery, liver disease, alcohol use, mental/mood disorders (including memory problems), frequent falls/injuries. It is important that all your doctors and dentists know that you take warfarin. Before having surgery or any medical/dental procedures, tell your doctor or dentist that you are taking this medication and about all the products you use (including prescription drugs, nonprescription drugs, and herbal products). Avoid getting injections into the muscles. If you must have an injection into a muscle (for  example, a flu shot), it should be given in the arm. This way, it will be easier to check for bleeding and/or apply pressure bandages. This medication may cause stomach bleeding. Daily use of alcohol while using this medicine will increase your risk for stomach bleeding and may also affect how this medication works. Limit or avoid alcoholic beverages. If you have not been eating well, if you have an illness or infection that causes fever, vomiting, or diarrhea for more than 2 days, or if you start using any antibiotic medications, contact your doctor or pharmacist immediately because these conditions can affect how warfarin works. This medication can cause heavy bleeding. To lower the chance of getting cut, bruised, or injured, use great caution with sharp objects like safety razors and nail cutters. Use an electric razor when shaving and a soft toothbrush when brushing your teeth. Avoid activities such as contact sports. If you fall or injure yourself, especially if you hit your head, call your doctor immediately. Your doctor may need to check you. The Food & Drug Administration has stated that generic warfarin products are interchangeable. However, consult your doctor or pharmacist before switching warfarin products. Be careful not to take more than one medication that contains warfarin unless specifically directed by the doctor or health care provider who is monitoring your warfarin treatment. Older adults may be at greater risk for bleeding while using this drug. This medication is not recommended for use during pregnancy because of serious (possibly fatal) harm to an unborn baby. Discuss the use of reliable forms of birth control with your doctor. If you become pregnant or think you may be pregnant, tell your doctor immediately. If you are planning pregnancy, discuss a plan for managing your condition with your doctor before you become pregnant. Your doctor may switch the type of medication you use during  "pregnancy. Very small amounts of this medication may pass into breast milk but is unlikely to harm a nursing infant. Consult your doctor before breast-feeding.      DRUG INTERACTIONS:  Drug interactions may change how your medications work or increase your risk for serious side effects. This document does not contain all possible drug interactions. Keep a list of all the products you use (including prescription/nonprescription drugs and herbal products) and share it with your doctor and pharmacist. Do not start, stop, or change the dosage of any medicines without your doctor's approval. Warfarin interacts with many prescription, nonprescription, vitamin, and herbal products. This includes medications that are applied to the skin or inside the vagina or rectum. The interactions with warfarin usually result in an increase or decrease in the \"blood-thinning\" (anticoagulant) effect. Your doctor or other health care professional should closely monitor you to prevent serious bleeding or clotting problems. While taking warfarin, it is very important to tell your doctor or pharmacist of any changes in medications, vitamins, or herbal products that you are taking. Some products that may interact with this drug include: capecitabine, imatinib, mifepristone. Aspirin, aspirin-like drugs (salicylates), and nonsteroidal anti-inflammatory drugs (NSAIDs such as ibuprofen, naproxen, celecoxib) may have effects similar to warfarin. These drugs may increase the risk of bleeding problems if taken during treatment with warfarin. Carefully check all prescription/nonprescription product labels (including drugs applied to the skin such as pain-relieving creams) since the products may contain NSAIDs or salicylates. Talk to your doctor about using a different medication (such as acetaminophen) to treat pain/fever. Low-dose aspirin and related drugs (such as clopidogrel, ticlopidine) should be continued if prescribed by your doctor for " specific medical reasons such as heart attack or stroke prevention. Consult your doctor or pharmacist for more details. Many herbal products interact with warfarin. Tell your doctor before taking any herbal products, especially bromelains, coenzyme Q10, cranberry, danshen, dong quai, fenugreek, garlic, ginkgo biloba, ginseng, and Becky's wort, among others. This medication may interfere with a certain laboratory test to measure theophylline levels, possibly causing false test results. Make sure laboratory personnel and all your doctors know you use this drug.      OVERDOSE:  If overdose is suspected, contact a poison control center or emergency room immediately. [place] residents can call the [place] National Poison Hotline at 1-531.904.6796. [place] residents can call a provincial poison control center. Symptoms of overdose may include: bloody/black/tarry stools, pink/dark urine, unusual/prolonged bleeding.      NOTES:  Do not share this medication with others. Laboratory and/or medical tests (such as INR, complete blood count) must be performed periodically to monitor your progress or check for side effects. Consult your doctor for more details.      MISSED DOSE:  For the best possible benefit, do not miss any doses. If you do miss a dose and remember on the same day, take it as soon as you remember. If you remember on the next day, skip the missed dose and resume your usual dosing schedule. Do not double the dose to catch up because this could increase your risk for bleeding. Keep a record of missed doses to give to your doctor or pharmacist. Contact your doctor or pharmacist if you miss 2 or more doses in a row.      STORAGE:  Store at room temperature away from light and moisture. Do not store in the bathroom. Keep all medications away from children and pets. Do not flush medications down the toilet or pour them into a drain unless instructed to do so. Properly discard this product when it is  or no longer  needed. Consult your pharmacist or local waste disposal company for more details about how to safely discard your product.      MEDICAL ALERT:  Your condition and medication can cause complications in a medical emergency. For information about enrolling in MedicAlert, call 1-932.942.7431 ([place]) or 1-558.510.5608 ([place]).      Information last revised October 2010 Copyright(c) 2010 [employer]             · Is patient Post Blood Transfusion?  No    Depression / Suicide Risk    As you are discharged from this [facility], it is important to learn how to keep safe from harming yourself.    Recognize the warning signs:  · Abrupt changes in personality, positive or negative- including increase in energy   · Giving away possessions  · Change in eating patterns- significant weight changes-  positive or negative  · Change in sleeping patterns- unable to sleep or sleeping all the time   · Unwillingness or inability to communicate  · Depression  · Unusual sadness, discouragement and loneliness  · Talk of wanting to die  · Neglect of personal appearance   · Rebelliousness- reckless behavior  · Withdrawal from people/activities they love  · Confusion- inability to concentrate     If you or a loved one observes any of these behaviors or has concerns about self-harm, here's what you can do:  · Talk about it- your feelings and reasons for harming yourself  · Remove any means that you might use to hurt yourself (examples: pills, rope, extension cords, firearm)  · Get professional help from the community (Mental Health, Substance Abuse, psychological counseling)  · Do not be alone:Call your Safe Contact- someone whom you trust who will be there for you.  · Call your local CRISIS HOTLINE 377-1523 or 507-802-6894  · Call your local Children's Mobile Crisis Response Team [place] (241) 176-3689 or www.Ofidium  · Call the toll free National Suicide Prevention Hotlines   · National Suicide Prevention  Lifeline 742-861-FUQU (7318)  · National [place] Line Network 800-SUICIDE (512-2300)

## 2017-06-23 NOTE — DISCHARGE SUMMARY
DISCHARGE DIAGNOSES:  1.  Pandiverticulosis with polyp removal.  2.  Pancytopenia with iron deficiency.    OTHER DIAGNOSES:  1.  Recent hospitalization for influenza B infection, hypertension,   hyperlipidemia, end-stage renal disease, on hemodialysis, deep venous   thrombosis x3 on Coumadin therapy, angina, glaucoma, hypothyroidism, pulmonary   hypertension, anemia of chronic disease, mitral regurgitation with pulmonary   artery hypertension.    ADMISSION HISTORY:  Please refer to the admission note of 06/11/2017 for   details.    HOSPITAL COURSE:  The patient is an 82-year-old female with history of   multiple medical problems including end-stage renal disease, on hemodialysis,   DVT x3, on anticoagulation, who was recently hospitalized for influenza B   infection.  Patient now presents with complaints of bright red blood per   rectum.  Patient was monitored closely with serial hemoglobins.  Patient's   hemoglobin was low, but remained stable.  Patient's iron profile indicated   iron deficiency anemia.  Patient's urinalysis indicated possible infection.    However, the urine culture was not taken.  Patient remained hemodynamically   stable.  Gastroenterology consultation was obtained.  Patient underwent   colonoscopy evaluation with findings of pandiverticulosis and polyp that was   snared.  Pathology is pending.  Patient was given iron replacement, which she   tolerated well without complications.  Nephrology consultation was obtained   for continued hemodialysis.  With these measures, the patient's symptoms   improved significantly.  At the time of the dictation, the patient was feeling   better, eating and drinking well, receiving hemodialysis and was   hemodynamically stable.    CONSULTATIONS OBTAINED:  Deonna Mccarthy MD, of nephrology, Alcides Stein MD   of gastroenterology.    PROCEDURES:  Left knee x-ray on 06/13/2017, colonoscopy on 06/22/2017.    DISCHARGE MEDICATIONS:  Acetaminophen 650 mg q. 6 hours  p.r.n. for moderate   pain, ferrous sulfate 325 mg daily, hydralazine 10 mg every 8 hours,   isosorbide dinitrate 10 mg q. 8 hours, lactobacillus granules 2 packets 3   times a day, lidocaine 1% solution p.r.n. for dialysis, losartan 25 mg daily,   metoprolol SR 25 mg daily, omeprazole 20 mg b.i.d., sevelamer carbonate 800 mg   b.i.d., simvastatin 40 mg at bedtime, Coumadin 2.5 mg daily.    DISCHARGE DIET:  High-fiber renal diet.    DISCHARGE ACTIVITY:  Gradual increase in activity per skilled nursing   facility.    DISCHARGE FOLLOWUP:  1.  With Dr. Alistair Gandara, patient's primary care provider, per skilled   nursing facility recommendation.  2.  With Dr. Fadi Najjar of nephrology and hemodialysis as scheduled.  3.  With Dr. Jeff Comer of gastroenterology per his recommendation.    Total discharge time process is 36 minutes.       ____________________________________     MD ROBBY GUZMAN / LASHAY    DD:  06/23/2017 10:53:30  DT:  06/23/2017 11:38:28    D#:  [id number]  Job#:  [id number]    cc: ALISTAIR GANDARA MD

## 2017-06-23 NOTE — DISCHARGE PLANNING
Pt accepted to [facility], MD has dictated. Transportation arranged for 14:00. Transfer packet and [place] will be completed and placed with pts hard chart. Bedside Rn updated.

## 2017-06-23 NOTE — PROGRESS NOTES
Nephrology Progress Note, Adult, Complex               Author:Fadi Najjar Date & Time created: 2017  12:51 PM     Interval History:  81 y/o female with ESRD/HD admitted with anemia, recta; bleeding, vivian, syncopal episode  Pt is lethargic post colonoscopy  Seen and examined while getting HD.    Review of Systems:  Review of Systems   Constitutional: Negative for fever and chills.   Gastrointestinal: Negative for nausea and vomiting.       Physical Exam:  Physical Exam   Constitutional: She appears cachectic. No distress.   HENT:   Head: Normocephalic and atraumatic.   Mouth/Throat: Oropharynx is clear and moist.   Eyes: No scleral icterus.   Cardiovascular: Normal rate and regular rhythm.    Pulmonary/Chest: Effort normal and breath sounds normal. No respiratory distress. She has no wheezes.   Musculoskeletal: She exhibits no edema.   Neurological: She is alert.   Nursing note and vitals reviewed.      Labs:        Invalid input(s): [id number]      No results for input(s): SODIUM, POTASSIUM, CHLORIDE, CO2, BUN, CREATININE, MAGNESIUM, PHOSPHORUS, CALCIUM in the last 72 hours.  No results for input(s): ALTSGPT, ASTSGOT, ALKPHOSPHAT, TBILIRUBIN, DBILIRUBIN, GAMMAGT, AMYLASE, LIPASE, ALB, PREALBUMIN, GLUCOSE in the last 72 hours.  Recent Labs      17   0225  17   0856   17   1910  17   0150  17   0850  17   1801  17   0311  17   0444   HEMOGLOBIN   --    --    < >   --   7.5*  8.6*  9.3*   --   8.0*   HEMATOCRIT   --    --    < >   --   23.9*  27.7*  29.7*   --   25.5*   PROTHROMBTM  22.1*   --    --    --   21.9*   --    --   21.1*   --    APTT  76.5*  126.6*   --   >240.0*  74.6*   --    --    --    --    INR  1.87*   --    --    --   1.85*   --    --   1.76*   --     < > = values in this interval not displayed.               Hemodynamics:  Temp (24hrs), Av.4 °C (97.6 °F), Min:36.1 °C (96.9 °F), Max:36.9 °C (98.4 °F)  Temperature: 36.9 °C (98.4 °F)  Pulse   Av.2  Min: 52  Max: 105   Blood Pressure : 134/41 mmHg (RN Notified)     Respiratory:    Respiration: 17, Pulse Oximetry: 98 %           Fluids:    Intake/Output Summary (Last 24 hours) at 17 1251  Last data filed at 17 1106   Gross per 24 hour   Intake    500 ml   Output   2000 ml   Net  -1500 ml        GI/Nutrition:  Orders Placed This Encounter   Procedures   • DIET ORDER     Standing Status: Standing      Number of Occurrences: 1      Standing Expiration Date:      Order Specific Question:  Diet:     Answer:  Renal [8]     Order Specific Question:  Texture/Fiber modifications:     Answer:  High Fiber [6]     Medical Decision Making, by Problem:  Active Hospital Problems    Diagnosis   • Hematochezia [K92.1]   • ESRD (end stage renal disease) on dialysis (CMS-HCC) [N18.6, Z99.2]   • Chronic anticoagulation [Z79.01]   • Essential hypertension [I10]   • Anemia of chronic disease [D63.8]   • Syncope, near [R55]   • Left knee pain [M25.562]       Labs reviewed                      Assessment and Plan  1.ESRD/HD -MWF  2.HTN: BP well conttrolled  3.Electrolytes: well controlled.  4.Anemia:Epogen with HD  5.Volume:better  Recs:  HD today  All meds to renal doses  Diet: renal

## 2017-06-24 ENCOUNTER — HOSPITAL ENCOUNTER (OUTPATIENT)
Dept: LAB | Facility: MEDICAL CENTER | Age: 82
End: 2017-06-24
Attending: INTERNAL MEDICINE
Payer: COMMERCIAL

## 2017-06-24 LAB
ANION GAP SERPL CALC-SCNC: 4 MMOL/L (ref 0–11.9)
BASOPHILS # BLD AUTO: 0.5 % (ref 0–1.8)
BASOPHILS # BLD: 0.02 K/UL (ref 0–0.12)
BUN SERPL-MCNC: 11 MG/DL (ref 8–22)
CALCIUM SERPL-MCNC: 9.6 MG/DL (ref 8.5–10.5)
CHLORIDE SERPL-SCNC: 104 MMOL/L (ref 96–112)
CO2 SERPL-SCNC: 31 MMOL/L (ref 20–33)
CREAT SERPL-MCNC: 3.32 MG/DL (ref 0.5–1.4)
EOSINOPHIL # BLD AUTO: 0.26 K/UL (ref 0–0.51)
EOSINOPHIL NFR BLD: 6 % (ref 0–6.9)
ERYTHROCYTE [DISTWIDTH] IN BLOOD BY AUTOMATED COUNT: 57 FL (ref 35.9–50)
GFR SERPL CREATININE-BSD FRML MDRD: 13 ML/MIN/1.73 M 2
GLUCOSE SERPL-MCNC: 90 MG/DL (ref 65–99)
HCT VFR BLD AUTO: 21.6 % (ref 37–47)
HGB BLD-MCNC: 6.7 G/DL (ref 12–16)
IMM GRANULOCYTES # BLD AUTO: 0 K/UL (ref 0–0.11)
IMM GRANULOCYTES NFR BLD AUTO: 0 % (ref 0–0.9)
INR PPP: 1.5 (ref 0.87–1.13)
LYMPHOCYTES # BLD AUTO: 0.84 K/UL (ref 1–4.8)
LYMPHOCYTES NFR BLD: 19.5 % (ref 22–41)
MCH RBC QN AUTO: 31.6 PG (ref 27–33)
MCHC RBC AUTO-ENTMCNC: 31 G/DL (ref 33.6–35)
MCV RBC AUTO: 101.9 FL (ref 81.4–97.8)
MONOCYTES # BLD AUTO: 0.6 K/UL (ref 0–0.85)
MONOCYTES NFR BLD AUTO: 13.9 % (ref 0–13.4)
NEUTROPHILS # BLD AUTO: 2.59 K/UL (ref 2–7.15)
NEUTROPHILS NFR BLD: 60.1 % (ref 44–72)
NRBC # BLD AUTO: 0 K/UL
NRBC BLD AUTO-RTO: 0 /100 WBC
PLATELET # BLD AUTO: 123 K/UL (ref 164–446)
PMV BLD AUTO: 11.5 FL (ref 9–12.9)
POTASSIUM SERPL-SCNC: 4.1 MMOL/L (ref 3.6–5.5)
PROTHROMBIN TIME: 18.6 SEC (ref 12–14.6)
RBC # BLD AUTO: 2.12 M/UL (ref 4.2–5.4)
SODIUM SERPL-SCNC: 139 MMOL/L (ref 135–145)
WBC # BLD AUTO: 4.3 K/UL (ref 4.8–10.8)

## 2017-06-26 LAB
HCT VFR BLD AUTO: 22.7 % (ref 37–47)
HGB BLD-MCNC: 6.9 G/DL (ref 12–16)

## 2017-06-27 LAB
INR PPP: 1.71 (ref 0.87–1.13)
PROTHROMBIN TIME: 20.6 SEC (ref 12–14.6)

## 2017-06-28 ENCOUNTER — HOSPITAL ENCOUNTER (INPATIENT)
Facility: MEDICAL CENTER | Age: 82
LOS: 2 days | DRG: 813 | End: 2017-06-30
Attending: EMERGENCY MEDICINE | Admitting: FAMILY MEDICINE
Payer: MEDICARE

## 2017-06-28 ENCOUNTER — RESOLUTE PROFESSIONAL BILLING HOSPITAL PROF FEE (OUTPATIENT)
Dept: HOSPITALIST | Facility: MEDICAL CENTER | Age: 82
End: 2017-06-28
Payer: MEDICARE

## 2017-06-28 DIAGNOSIS — D61.818 PANCYTOPENIA (HCC): ICD-10-CM

## 2017-06-28 DIAGNOSIS — R42 VERTIGO: ICD-10-CM

## 2017-06-28 DIAGNOSIS — D68.32 HEMORRHAGIC DISORDER DUE TO EXTRINSIC CIRCULATING ANTICOAGULANTS (HCC): ICD-10-CM

## 2017-06-28 DIAGNOSIS — I27.21 PAH (PULMONARY ARTERY HYPERTENSION) (HCC): Chronic | ICD-10-CM

## 2017-06-28 DIAGNOSIS — Z79.01 CHRONIC ANTICOAGULATION: Chronic | ICD-10-CM

## 2017-06-28 DIAGNOSIS — Z99.2 ESRD (END STAGE RENAL DISEASE) ON DIALYSIS (HCC): ICD-10-CM

## 2017-06-28 DIAGNOSIS — N18.6 ESRD (END STAGE RENAL DISEASE) ON DIALYSIS (HCC): ICD-10-CM

## 2017-06-28 DIAGNOSIS — K92.1 HEMATOCHEZIA: ICD-10-CM

## 2017-06-28 DIAGNOSIS — I34.0 MODERATE MITRAL REGURGITATION: Chronic | ICD-10-CM

## 2017-06-28 DIAGNOSIS — R54 SENILE DEBILITY: ICD-10-CM

## 2017-06-28 DIAGNOSIS — J96.01 ACUTE RESPIRATORY FAILURE WITH HYPOXIA (HCC): ICD-10-CM

## 2017-06-28 DIAGNOSIS — Z86.718 HISTORY OF DVT OF LOWER EXTREMITY: ICD-10-CM

## 2017-06-28 DIAGNOSIS — D53.9 MACROCYTIC ANEMIA: ICD-10-CM

## 2017-06-28 DIAGNOSIS — D69.6 THROMBOCYTOPENIA (HCC): ICD-10-CM

## 2017-06-28 DIAGNOSIS — R00.1 BRADYCARDIA: ICD-10-CM

## 2017-06-28 DIAGNOSIS — I51.89 DIASTOLIC DYSFUNCTION: ICD-10-CM

## 2017-06-28 DIAGNOSIS — R19.5 LOOSE STOOLS: ICD-10-CM

## 2017-06-28 DIAGNOSIS — N18.6 END STAGE RENAL DISEASE (HCC): ICD-10-CM

## 2017-06-28 PROBLEM — K92.2 GIB (GASTROINTESTINAL BLEEDING): Status: ACTIVE | Noted: 2017-06-28

## 2017-06-28 LAB
ABO GROUP BLD: NORMAL
ALBUMIN SERPL BCP-MCNC: 3.1 G/DL (ref 3.2–4.9)
ALBUMIN/GLOB SERPL: 0.9 G/DL
ALP SERPL-CCNC: 123 U/L (ref 30–99)
ALT SERPL-CCNC: 11 U/L (ref 2–50)
ANION GAP SERPL CALC-SCNC: 6 MMOL/L (ref 0–11.9)
AST SERPL-CCNC: 19 U/L (ref 12–45)
BARCODED ABORH UBTYP: 9500
BARCODED PRD CODE UBPRD: NORMAL
BARCODED UNIT NUM UBUNT: NORMAL
BASOPHILS # BLD AUTO: 0.6 % (ref 0–1.8)
BASOPHILS # BLD AUTO: 0.8 % (ref 0–1.8)
BASOPHILS # BLD: 0.03 K/UL (ref 0–0.12)
BASOPHILS # BLD: 0.04 K/UL (ref 0–0.12)
BILIRUB SERPL-MCNC: 0.5 MG/DL (ref 0.1–1.5)
BLD GP AB SCN SERPL QL: NORMAL
BUN SERPL-MCNC: 26 MG/DL (ref 8–22)
CALCIUM SERPL-MCNC: 10.7 MG/DL (ref 8.5–10.5)
CHLORIDE SERPL-SCNC: 97 MMOL/L (ref 96–112)
CO2 SERPL-SCNC: 32 MMOL/L (ref 20–33)
COMPONENT R 8504R: NORMAL
CREAT SERPL-MCNC: 4.86 MG/DL (ref 0.5–1.4)
EKG IMPRESSION: NORMAL
EOSINOPHIL # BLD AUTO: 0.4 K/UL (ref 0–0.51)
EOSINOPHIL # BLD AUTO: 0.42 K/UL (ref 0–0.51)
EOSINOPHIL NFR BLD: 7.8 % (ref 0–6.9)
EOSINOPHIL NFR BLD: 9 % (ref 0–6.9)
ERYTHROCYTE [DISTWIDTH] IN BLOOD BY AUTOMATED COUNT: 57.8 FL (ref 35.9–50)
ERYTHROCYTE [DISTWIDTH] IN BLOOD BY AUTOMATED COUNT: 59.6 FL (ref 35.9–50)
GFR SERPL CREATININE-BSD FRML MDRD: 9 ML/MIN/1.73 M 2
GLOBULIN SER CALC-MCNC: 3.4 G/DL (ref 1.9–3.5)
GLUCOSE SERPL-MCNC: 101 MG/DL (ref 65–99)
HCT VFR BLD AUTO: 21.1 % (ref 37–47)
HCT VFR BLD AUTO: 25.3 % (ref 37–47)
HGB BLD-MCNC: 6.6 G/DL (ref 12–16)
HGB BLD-MCNC: 7.7 G/DL (ref 12–16)
HGB BLD-MCNC: 9.4 G/DL (ref 12–16)
IMM GRANULOCYTES # BLD AUTO: 0.02 K/UL (ref 0–0.11)
IMM GRANULOCYTES # BLD AUTO: 0.02 K/UL (ref 0–0.11)
IMM GRANULOCYTES NFR BLD AUTO: 0.4 % (ref 0–0.9)
IMM GRANULOCYTES NFR BLD AUTO: 0.4 % (ref 0–0.9)
INR PPP: 2.22 (ref 0.87–1.13)
LYMPHOCYTES # BLD AUTO: 1.08 K/UL (ref 1–4.8)
LYMPHOCYTES # BLD AUTO: 1.15 K/UL (ref 1–4.8)
LYMPHOCYTES NFR BLD: 22.5 % (ref 22–41)
LYMPHOCYTES NFR BLD: 23.2 % (ref 22–41)
MCH RBC QN AUTO: 31.8 PG (ref 27–33)
MCH RBC QN AUTO: 32.2 PG (ref 27–33)
MCHC RBC AUTO-ENTMCNC: 30.4 G/DL (ref 33.6–35)
MCHC RBC AUTO-ENTMCNC: 31.3 G/DL (ref 33.6–35)
MCV RBC AUTO: 102.9 FL (ref 81.4–97.8)
MCV RBC AUTO: 104.5 FL (ref 81.4–97.8)
MONOCYTES # BLD AUTO: 0.54 K/UL (ref 0–0.85)
MONOCYTES # BLD AUTO: 0.72 K/UL (ref 0–0.85)
MONOCYTES NFR BLD AUTO: 10.5 % (ref 0–13.4)
MONOCYTES NFR BLD AUTO: 15.5 % (ref 0–13.4)
NEUTROPHILS # BLD AUTO: 2.39 K/UL (ref 2–7.15)
NEUTROPHILS # BLD AUTO: 2.97 K/UL (ref 2–7.15)
NEUTROPHILS NFR BLD: 51.3 % (ref 44–72)
NEUTROPHILS NFR BLD: 58 % (ref 44–72)
NRBC # BLD AUTO: 0.02 K/UL
NRBC # BLD AUTO: 0.02 K/UL
NRBC BLD AUTO-RTO: 0.4 /100 WBC
NRBC BLD AUTO-RTO: 0.4 /100 WBC
PLATELET # BLD AUTO: 132 K/UL (ref 164–446)
PLATELET # BLD AUTO: 171 K/UL (ref 164–446)
PMV BLD AUTO: 11.5 FL (ref 9–12.9)
PMV BLD AUTO: 11.6 FL (ref 9–12.9)
POTASSIUM SERPL-SCNC: 4.2 MMOL/L (ref 3.6–5.5)
POTASSIUM SERPL-SCNC: 4.6 MMOL/L (ref 3.6–5.5)
PRODUCT TYPE UPROD: NORMAL
PROT SERPL-MCNC: 6.5 G/DL (ref 6–8.2)
PROTHROMBIN TIME: 25.3 SEC (ref 12–14.6)
RBC # BLD AUTO: 2.05 M/UL (ref 4.2–5.4)
RBC # BLD AUTO: 2.42 M/UL (ref 4.2–5.4)
RH BLD: NORMAL
SODIUM SERPL-SCNC: 135 MMOL/L (ref 135–145)
TROPONIN I SERPL-MCNC: 0.05 NG/ML (ref 0–0.04)
UNIT STATUS USTAT: NORMAL
WBC # BLD AUTO: 4.7 K/UL (ref 4.8–10.8)
WBC # BLD AUTO: 5.1 K/UL (ref 4.8–10.8)

## 2017-06-28 PROCEDURE — 700102 HCHG RX REV CODE 250 W/ 637 OVERRIDE(OP): Performed by: FAMILY MEDICINE

## 2017-06-28 PROCEDURE — 36415 COLL VENOUS BLD VENIPUNCTURE: CPT

## 2017-06-28 PROCEDURE — 86901 BLOOD TYPING SEROLOGIC RH(D): CPT

## 2017-06-28 PROCEDURE — 85018 HEMOGLOBIN: CPT

## 2017-06-28 PROCEDURE — 30233N1 TRANSFUSION OF NONAUTOLOGOUS RED BLOOD CELLS INTO PERIPHERAL VEIN, PERCUTANEOUS APPROACH: ICD-10-PCS | Performed by: FAMILY MEDICINE

## 2017-06-28 PROCEDURE — 86923 COMPATIBILITY TEST ELECTRIC: CPT

## 2017-06-28 PROCEDURE — 5A1D60Z PERFORMANCE OF URINARY FILTRATION, MULTIPLE: ICD-10-PCS | Performed by: INTERNAL MEDICINE

## 2017-06-28 PROCEDURE — 86850 RBC ANTIBODY SCREEN: CPT

## 2017-06-28 PROCEDURE — 80053 COMPREHEN METABOLIC PANEL: CPT

## 2017-06-28 PROCEDURE — P9016 RBC LEUKOCYTES REDUCED: HCPCS

## 2017-06-28 PROCEDURE — 86900 BLOOD TYPING SEROLOGIC ABO: CPT

## 2017-06-28 PROCEDURE — 84132 ASSAY OF SERUM POTASSIUM: CPT

## 2017-06-28 PROCEDURE — 85610 PROTHROMBIN TIME: CPT

## 2017-06-28 PROCEDURE — 36430 TRANSFUSION BLD/BLD COMPNT: CPT

## 2017-06-28 PROCEDURE — 99285 EMERGENCY DEPT VISIT HI MDM: CPT

## 2017-06-28 PROCEDURE — 99223 1ST HOSP IP/OBS HIGH 75: CPT | Performed by: FAMILY MEDICINE

## 2017-06-28 PROCEDURE — A9270 NON-COVERED ITEM OR SERVICE: HCPCS | Performed by: FAMILY MEDICINE

## 2017-06-28 PROCEDURE — 770020 HCHG ROOM/CARE - TELE (206)

## 2017-06-28 PROCEDURE — 90935 HEMODIALYSIS ONE EVALUATION: CPT

## 2017-06-28 PROCEDURE — 93005 ELECTROCARDIOGRAM TRACING: CPT | Performed by: EMERGENCY MEDICINE

## 2017-06-28 PROCEDURE — 84484 ASSAY OF TROPONIN QUANT: CPT

## 2017-06-28 PROCEDURE — 304562 HCHG STAT O2 MASK/CANNULA

## 2017-06-28 PROCEDURE — 85025 COMPLETE CBC W/AUTO DIFF WBC: CPT

## 2017-06-28 RX ORDER — OMEPRAZOLE 20 MG/1
20 CAPSULE, DELAYED RELEASE ORAL
Status: DISCONTINUED | OUTPATIENT
Start: 2017-06-28 | End: 2017-06-30 | Stop reason: HOSPADM

## 2017-06-28 RX ORDER — SENNOSIDES 8.6 MG
650 CAPSULE ORAL EVERY 8 HOURS PRN
COMMUNITY
End: 2017-07-17

## 2017-06-28 RX ORDER — CINACALCET 30 MG/1
90 TABLET, FILM COATED ORAL DAILY
Status: DISCONTINUED | OUTPATIENT
Start: 2017-06-29 | End: 2017-06-28

## 2017-06-28 RX ORDER — LIDOCAINE HYDROCHLORIDE 10 MG/ML
1 INJECTION, SOLUTION INFILTRATION; PERINEURAL PRN
Status: DISCONTINUED | OUTPATIENT
Start: 2017-06-28 | End: 2017-06-30 | Stop reason: HOSPADM

## 2017-06-28 RX ORDER — LABETALOL HYDROCHLORIDE 5 MG/ML
10 INJECTION, SOLUTION INTRAVENOUS EVERY 4 HOURS PRN
Status: DISCONTINUED | OUTPATIENT
Start: 2017-06-28 | End: 2017-06-30 | Stop reason: HOSPADM

## 2017-06-28 RX ORDER — ALBUMIN (HUMAN) 12.5 G/50ML
12.5 SOLUTION INTRAVENOUS
Status: DISCONTINUED | OUTPATIENT
Start: 2017-06-28 | End: 2017-06-30 | Stop reason: HOSPADM

## 2017-06-28 RX ORDER — WARFARIN SODIUM 3 MG/1
3-4 TABLET ORAL DAILY
Status: ON HOLD | COMMUNITY
End: 2017-06-30

## 2017-06-28 RX ORDER — ONDANSETRON 4 MG/1
4 TABLET, ORALLY DISINTEGRATING ORAL EVERY 4 HOURS PRN
Status: DISCONTINUED | OUTPATIENT
Start: 2017-06-28 | End: 2017-06-30 | Stop reason: HOSPADM

## 2017-06-28 RX ORDER — ENALAPRILAT 1.25 MG/ML
1.25 INJECTION INTRAVENOUS EVERY 6 HOURS PRN
Status: DISCONTINUED | OUTPATIENT
Start: 2017-06-28 | End: 2017-06-30 | Stop reason: HOSPADM

## 2017-06-28 RX ORDER — FERROUS SULFATE 325(65) MG
325 TABLET ORAL 2 TIMES DAILY WITH MEALS
Status: DISCONTINUED | OUTPATIENT
Start: 2017-06-28 | End: 2017-06-30 | Stop reason: HOSPADM

## 2017-06-28 RX ORDER — AMOXICILLIN 250 MG
2 CAPSULE ORAL 2 TIMES DAILY
Status: DISCONTINUED | OUTPATIENT
Start: 2017-06-28 | End: 2017-06-29

## 2017-06-28 RX ORDER — ONDANSETRON 2 MG/ML
4 INJECTION INTRAMUSCULAR; INTRAVENOUS EVERY 4 HOURS PRN
Status: DISCONTINUED | OUTPATIENT
Start: 2017-06-28 | End: 2017-06-30 | Stop reason: HOSPADM

## 2017-06-28 RX ORDER — BISACODYL 10 MG
10 SUPPOSITORY, RECTAL RECTAL
Status: DISCONTINUED | OUTPATIENT
Start: 2017-06-28 | End: 2017-06-29

## 2017-06-28 RX ORDER — SIMVASTATIN 40 MG
40 TABLET ORAL NIGHTLY
Status: DISCONTINUED | OUTPATIENT
Start: 2017-06-28 | End: 2017-06-30 | Stop reason: HOSPADM

## 2017-06-28 RX ORDER — ACETAMINOPHEN 325 MG/1
650 TABLET ORAL EVERY 6 HOURS PRN
Status: DISCONTINUED | OUTPATIENT
Start: 2017-06-28 | End: 2017-06-30 | Stop reason: HOSPADM

## 2017-06-28 RX ORDER — POLYETHYLENE GLYCOL 3350 17 G/17G
1 POWDER, FOR SOLUTION ORAL
Status: DISCONTINUED | OUTPATIENT
Start: 2017-06-28 | End: 2017-06-29

## 2017-06-28 RX ORDER — SEVELAMER HYDROCHLORIDE 400 MG/1
800 TABLET, FILM COATED ORAL 2 TIMES DAILY WITH MEALS
Status: DISCONTINUED | OUTPATIENT
Start: 2017-06-28 | End: 2017-06-30 | Stop reason: HOSPADM

## 2017-06-28 RX ADMIN — SIMVASTATIN 40 MG: 40 TABLET, FILM COATED ORAL at 20:08

## 2017-06-28 RX ADMIN — STANDARDIZED SENNA CONCENTRATE AND DOCUSATE SODIUM 2 TABLET: 8.6; 5 TABLET, FILM COATED ORAL at 20:08

## 2017-06-28 ASSESSMENT — ENCOUNTER SYMPTOMS
CHILLS: 0
PALPITATIONS: 0
FEVER: 0

## 2017-06-28 ASSESSMENT — LIFESTYLE VARIABLES
ALCOHOL_USE: NO
EVER_SMOKED: NEVER

## 2017-06-28 ASSESSMENT — PAIN SCALES - GENERAL
PAINLEVEL_OUTOF10: 0
PAINLEVEL_OUTOF10: 0

## 2017-06-28 NOTE — ED PROVIDER NOTES
ED Provider Note    CHIEF COMPLAINT  Chief Complaint   Patient presents with   • Abnormal Labs     sent from [facility] for further evaluation of low h/h. pt needs dialysis today but states they wouldnt do r/t abnormal labs    • Dizziness   • Weakness     generalized weakness        HPI  Skye James is a 82 y.o. female with h/o CKD on HD M/W/F, DVT on coumadin, and HTN who presents complaining of weakness and dizziness.  Sent here for further evaluation and dialysis.    Pt states she has felt dizzy and generally weak over the last few days. Patient was sent here for evaluation after found to be anemic. She is due for dialysis today.    Patient denies chest pain, shortness of breath, syncope, melena, hematochezia, hematemesis.      ALLERGIES  Allergies   Allergen Reactions   • Nkda [No Known Drug Allergy]        CURRENT MEDICATIONS  Home Medications     Reviewed by Marlen Oreilly R.N. (Registered Nurse) on 06/28/17 at 0957  Med List Status: Partial    Medication Last Dose Status    acetaminophen (TYLENOL) 325 MG Tab 6/28/2017 Active    ferrous sulfate 325 (65 FE) MG tablet 6/28/2017 Active    hydrALAZINE (APRESOLINE) 10 MG Tab 6/28/2017 Active    isosorbide dinitrate (ISORDIL) 10 MG Tab 6/28/2017 Active    lactobacillus granules (LACTINEX/FLORANEX) Pack 6/28/2017 Active    lidocaine (XYLOCAINE) 1 % Solution 6/28/2017 Active    losartan (COZAAR) 25 MG Tab 6/28/2017 Active    metoprolol SR (TOPROL XL) 25 MG TABLET SR 24 HR 6/28/2017 Active    omeprazole (PRILOSEC) 20 MG delayed-release capsule 6/28/2017 Active    SENSIPAR 90 MG Tab 6/28/2017 Active    Sevelamer Carbonate 800 MG Tab 6/28/2017 Active    simvastatin (ZOCOR) 40 MG Tab 6/28/2017 Active    warfarin (COUMADIN) 2.5 MG Tab 6/28/2017 Active                PAST MEDICAL HISTORY   has a past medical history of GOUT; other; Hypertension; CKD (chronic kidney disease); Renal disorder; Arrhythmia; Heart murmur; MEDICAL HOME (4/23/2013); Arthritis; DVT  "(deep venous thrombosis) (CMS-HCC); CATARACT; Preoperative clearance (7/15/2013); Unspecified hemorrhagic conditions; Dialysis; Pneumonia (5/24/2014); Other specified disorder of intestines; ASTHMA; Asthma; Anemia; Dental disorder; Falls; Anginal syndrome (CMS-HCC); Glaucoma; Fall; Indigestion; Disorder of thyroid; Chronic anticoagulation; Moderate mitral regurgitation; and PAH (pulmonary artery hypertension) - liekly due to mitral regurgitation but with history of thromboembolic disease.    SURGICAL HISTORY   has past surgical history that includes tonsillectomy; hysterectomy laparoscopy; av fistula creation (6/22/2010); recovery (4/3/2012); cataract phaco with iol (6/20/2012); recovery (11/27/2012); recovery (7/16/2013); recovery (8/5/2014); and colonoscopy - endo (N/A, 6/22/2017).    SOCIAL HISTORY  Social History     Social History Main Topics   • Smoking status: Never Smoker    • Smokeless tobacco: Never Used   • Alcohol Use: No   • Drug Use: No   • Sexual Activity: No         REVIEW OF SYSTEMS  See HPI for further details.  All other systems are negative except as above in HPI.      PHYSICAL EXAM  VITAL SIGNS: /47 mmHg  Pulse 74  Temp(Src) 36.9 °C (98.4 °F)  Resp 16  Ht 1.575 m (5' 2\")  Wt 52.164 kg (115 lb)  BMI 21.03 kg/m2  SpO2 100%  LMP 05/12/1970    General:  WDWN, nontoxic appearing in NAD; A+Ox3; V/S as above   Skin: warm and dry; good color; no rash  HEENT: NCAT; EOMs intact; no scleral icterus   Neck: FROM; no meningismus, supple  Cardiovascular: Regular heart rate and rhythm.  No murmurs, rubs, or gallops; pulses 2+ bilaterally radially and DP areas  Lungs: Clear to auscultation with good air movement bilaterally.  No wheezes, rhonchi, or rales.   Abdomen: BS present; soft; NTND; no rebound, guarding, or rigidity.  No organomegaly or pulsatile mass  Extremities: ANDRADE x 4; no e/o trauma; no pedal edema  Neurologic: CNs III-XII grossly intact; speech clear; distal sensation intact; " strength 4/5 UE/LEs  Psychiatric: Appropriate affect, normal mood  Rectal:  Guaiac positive brown stool    LABS  Labs Reviewed   PROTHROMBIN TIME - Abnormal; Notable for the following:     PT 25.3 (*)     INR 2.22 (*)     All other components within normal limits   COMP METABOLIC PANEL - Abnormal; Notable for the following:     Glucose 101 (*)     Bun 26 (*)     Creatinine 4.86 (*)     Calcium 10.7 (*)     Alkaline Phosphatase 123 (*)     Albumin 3.1 (*)     All other components within normal limits   TROPONIN - Abnormal; Notable for the following:     Troponin I 0.05 (*)     All other components within normal limits   CBC WITH DIFFERENTIAL - Abnormal; Notable for the following:     RBC 2.42 (*)     Hemoglobin 7.7 (*)     Hematocrit 25.3 (*)     .5 (*)     MCHC 30.4 (*)     RDW 59.6 (*)     Eosinophils 7.80 (*)     All other components within normal limits   ESTIMATED GFR - Abnormal; Notable for the following:     GFR If  10 (*)     GFR If Non  9 (*)     All other components within normal limits   HGB - Abnormal; Notable for the following:     Hemoglobin 9.4 (*)     All other components within normal limits   BASIC METABOLIC PANEL - Abnormal; Notable for the following:     Creatinine 3.21 (*)     All other components within normal limits    Narrative:     Indicate which anticoagulants the patient is on:->COUMADIN   CBC WITH DIFFERENTIAL - Abnormal; Notable for the following:     RBC 2.83 (*)     Hemoglobin 8.8 (*)     Hematocrit 28.0 (*)     MCV 98.9 (*)     MCHC 31.4 (*)     RDW 68.0 (*)     Platelet Count 163 (*)     Lymphocytes 18.50 (*)     Eosinophils 7.10 (*)     All other components within normal limits    Narrative:     Indicate which anticoagulants the patient is on:->COUMADIN   PROTHROMBIN TIME - Abnormal; Notable for the following:     PT 25.2 (*)     INR 2.21 (*)     All other components within normal limits    Narrative:     Indicate which anticoagulants the  patient is on:->COUMADIN   ESTIMATED GFR - Abnormal; Notable for the following:     GFR If  17 (*)     GFR If Non  14 (*)     All other components within normal limits    Narrative:     Indicate which anticoagulants the patient is on:->COUMADIN   HGB - Abnormal; Notable for the following:     Hemoglobin 9.5 (*)     All other components within normal limits   HGB - Abnormal; Notable for the following:     Hemoglobin 9.0 (*)     All other components within normal limits   HGB - Abnormal; Notable for the following:     Hemoglobin 8.6 (*)     All other components within normal limits   COD (ADULT)    Narrative:     SPECIMEN IS A RECOLLECT   COMPONENT CELLULAR    Narrative:     SPECIMEN IS A RECOLLECT   POTASSIUM SERUM (K)    Narrative:     If not drawn in the last 48 hours   RELEASE RED BLOOD CELLS-ACTIVE BLEEDING   TRANSFUSE RBC ACTIVE BLEED-NURSING COMMUNICATION       EKG  12 Lead EKG obtained at 1110 and interpreted by me to show:  Rhythm: Sinus rhythm   Rate: 66  Intervals:   MA: 172   QRS: 90   QTC: 390   All intervals are within normal limits  No ectopy  Normal axis  No ST changes  Clinical Impression: sinus rhythm with no acute st changes  Compared to previous EKG dated 6/12/17 which shows no change      MEDICAL RECORD  I have reviewed patient's medical record and pertinent results are listed below.      Colonoscopy on 6/22/17:  POSTPROCEDURE DIAGNOSES:  1.  Pancolonic diverticulosis.  2.  A 5 mm polyp in the descending colon at 58 cm, cold snared, partially    Retrieved.       COURSE & MEDICAL DECISION MAKING  I have reviewed any medical record information, laboratory studies and radiographic results as noted.    Skye James is a 82 y.o. female with ESRDz on HD and on coumadin for DVT x 3 who presents complaining of generalized weakness.    Pt recently admitted earlier this month for lower GI bleed.  Had colonoscopy at that time that showed pancolonic diverticulosis and  thought to have had recent diverticular bleed.    Today pt has guaiac positive stool and a worsening anemia accompanied by symptoms.  She is HD stable.  No melena or hematochezia.    I discussed the case with Dr. Marquez from nephrology who is aware that the patient will need dialysis today and is being admitted.    I discussed the patient with the hospitalist to agrees to admit the patient.    FINAL IMPRESSION  Anemia  Blood in stool    Electronically signed by: Andria Cho, 6/28/2017 10:04 AM

## 2017-06-28 NOTE — IP AVS SNAPSHOT
" <p align=\"LEFT\"><IMG SRC=\"//[email or web address]\" alt=\"Image\" WIDTH=\"50%\" HEIGHT=\"200\" BORDER=\"\"></p>                   Name:Skye Paintingchinson  Medical Record Number:[id number]  CSN: [id number]    YOB: 1935   Age: 82 y.o.  Sex: female  HT:1.575 m (5' 2\") WT: 57.8 kg (127 lb 6.8 oz)          Admit Date: 6/28/2017     Discharge Date:   Today's Date: 6/30/2017  Attending Doctor:  Oumou Bauman M.D.                  Allergies:  Nkda          Your appointments     Jul 01, 2017 10:25 AM   Adult Draw/Collection with [facility]   [facility] (--)    [address] [facility] [place] [address]   [phone]            Jul 19, 2017  9:15 AM   Follow Up Visit with Fadi Najjar, M.D.   [facility] ([address])    [address]  [place] [id number] [phone]           You will be receiving a confirmation call a few days before your appointment from our automated call confirmation system.                 Medication List      Take these Medications        Instructions    CULTURELLE PO    Take 1 Tab by mouth 3 times a day. Chewable 1 million cell   Dose:  1 Tab       ferrous sulfate 325 (65 FE) MG tablet    Take 1 Tab by mouth every morning with breakfast.   Dose:  325 mg       hydrALAZINE 10 MG Tabs   Commonly known as:  APRESOLINE    Take 1 Tab by mouth every 8 hours.   Dose:  10 mg       isosorbide dinitrate 10 MG Tabs   Commonly known as:  ISORDIL    Take 1 Tab by mouth 3 times a day.   Dose:  10 mg       losartan 25 MG Tabs   Commonly known as:  COZAAR    Take 25 mg by mouth every day.   Dose:  25 mg       metoprolol SR 25 MG Tb24   Commonly known as:  TOPROL XL    Take 25 mg by mouth every day.   Dose:  25 mg       omeprazole 20 MG delayed-release capsule   Commonly known as:  PRILOSEC    Take 1 Cap by mouth 2 times daily, before breakfast and dinner.   Dose:  20 mg       Sevelamer Carbonate 800 MG Tabs    Take 1 Tab by mouth 2 Times a Day.  "   Dose:  1 Tab       simvastatin 40 MG Tabs   Commonly known as:  ZOCOR    Take 40 mg by mouth every evening.   Dose:  40 mg       TYLENOL 8 HOUR 650 MG CR tablet   Generic drug:  acetaminophen    Take 650 mg by mouth every 8 hours as needed for Mild Pain or Moderate Pain.   Dose:  650 mg

## 2017-06-28 NOTE — IP AVS SNAPSHOT
6/30/2017    Skye James  [address]  [place] [address]    Dear Skye:    [facility] wants to ensure your discharge home is safe and you or your loved ones have had all of your questions answered regarding your care after you leave the hospital.    Below is a list of resources and contact information should you have any questions regarding your hospital stay, follow-up instructions, or active medical symptoms.    Questions or Concerns Regarding… Contact   Medical Questions Related to Your Discharge  (7 days a week, 8am-5pm) Contact a Nurse Care Coordinator   [phone]   Medical Questions Not Related to Your Discharge  (24 hours a day / 7 days a week)  Contact the Nurse Health Line   [phone]    Medications or Discharge Instructions Refer to your discharge packet   or contact your [facility] Primary Care Provider   [phone]   Follow-up Appointment(s) Schedule your appointment via [facility]   or contact Scheduling [phone]   Billing Review your statement via [employer]  or contact Billing [phone]   Medical Records Review your records via [employer]   or contact Medical Records [phone]     You may receive a telephone call within two days of discharge. This call is to make certain you understand your discharge instructions and have the opportunity to have any questions answered. You can also easily access your medical information, test results and upcoming appointments via the [employer] free online health management tool. You can learn more and sign up at [email or web address]. For assistance setting up your [employer] account, please call [phone].    Once again, we want to ensure your discharge home is safe and that you have a clear understanding of any next steps in your care. If you have any questions or concerns, please do not hesitate to contact us, we are here for you. Thank you for choosing [facility] for your healthcare needs.    Sincerely,    Your [facility] Healthcare Team

## 2017-06-28 NOTE — ED NOTES
Report to LEOBARDO Kaye, as charted, pt added to the transport list, transport instructed to obtain monitor from 8th floor for pt to go to dialysis.  Pt informed.

## 2017-06-28 NOTE — IP AVS SNAPSHOT
" Home Care Instructions                                                                                                                  Name:Skye Paintingchinson  Medical Record Number:[id number]  CSN: [id number]    YOB: 1935   Age: 82 y.o.  Sex: female  HT:1.575 m (5' 2\") WT: 57.8 kg (127 lb 6.8 oz)          Admit Date: 6/28/2017     Discharge Date:   Today's Date: 6/30/2017  Attending Doctor:  Oumou Bauman M.D.                  Allergies:  Nkda            Discharge Instructions       Discharge Instructions    Discharged to group home by [facility] with escort. Discharged via ambulance, hospital escort: Yes.  Special equipment needed: Oxygen    Be sure to schedule a follow-up appointment with your primary care doctor or any specialists as instructed.     Discharge Plan:   Diet Plan: Discussed  Activity Level: Discussed  Confirmed Follow up Appointment: No (Comments)  Confirmed Symptoms Management: Discussed  Medication Reconciliation Updated: Yes  Influenza Vaccine Indication: Not indicated: Previously immunized this influenza season and > 8 years of age    I understand that a diet low in cholesterol, fat, and sodium is recommended for good health. Unless I have been given specific instructions below for another diet, I accept this instruction as my diet prescription.   Other diet: cardiac    Special Instructions: None    · Is patient discharged on Warfarin / Coumadin?   No     · Is patient Post Blood Transfusion?  No    Depression / Suicide Risk    As you are discharged from this [facility] facility, it is important to learn how to keep safe from harming yourself.    Recognize the warning signs:  · Abrupt changes in personality, positive or negative- including increase in energy   · Giving away possessions  · Change in eating patterns- significant weight changes-  positive or negative  · Change in sleeping patterns- unable to sleep or sleeping all the time   · Unwillingness or inability to " communicate  · Depression  · Unusual sadness, discouragement and loneliness  · Talk of wanting to die  · Neglect of personal appearance   · Rebelliousness- reckless behavior  · Withdrawal from people/activities they love  · Confusion- inability to concentrate     If you or a loved one observes any of these behaviors or has concerns about self-harm, here's what you can do:  · Talk about it- your feelings and reasons for harming yourself  · Remove any means that you might use to hurt yourself (examples: pills, rope, extension cords, firearm)  · Get professional help from the community (Mental Health, Substance Abuse, psychological counseling)  · Do not be alone:Call your Safe Contact- someone whom you trust who will be there for you.  · Call your local CRISIS HOTLINE 785-2878 or 384-910-5755  · Call your local Children's Mobile Crisis Response Team [place] (674) 322-5082 or www.[employer] [place]  · Call the toll free National Suicide Prevention Hotlines   · National Suicide Prevention Lifeline 341-095-FBFK (3693)  · "[employer] Line Network 800-SUICIDE (821-0671)        Your appointments     Jul 01, 2017 10:25 AM   Adult Draw/Collection with LAB [facility]   LAB - [facility] (--)    [address] [place] [address]   [phone]            Jul 19, 2017  9:15 AM   Follow Up Visit with Fadi Najjar, M.D.   [facility] ([address])    [address] [place] [address] Medicine , [address]  [place] [id number] [phone]           You will be receiving a confirmation call a few days before your appointment from our automated call confirmation system.                 Discharge Medication Instructions:    Below are the medications your physician expects you to take upon discharge:    Review all your home medications and newly ordered medications with your doctor and/or pharmacist. Follow medication instructions as directed by your doctor and/or pharmacist.    Please keep your medication list  with you and share with your physician.               Medication List      CONTINUE taking these medications        Instructions    Morning Afternoon Evening Bedtime    CULTURELLE PO        Take 1 Tab by mouth 3 times a day. Chewable 1 million cell   Dose:  1 Tab                        ferrous sulfate 325 (65 FE) MG tablet   Last time this was given:  325 mg on 6/30/2017  7:29 AM        Take 1 Tab by mouth every morning with breakfast.   Dose:  325 mg                        hydrALAZINE 10 MG Tabs   Commonly known as:  APRESOLINE        Take 1 Tab by mouth every 8 hours.   Dose:  10 mg                        isosorbide dinitrate 10 MG Tabs   Commonly known as:  ISORDIL        Take 1 Tab by mouth 3 times a day.   Dose:  10 mg                        losartan 25 MG Tabs   Commonly known as:  COZAAR        Take 25 mg by mouth every day.   Dose:  25 mg                        metoprolol SR 25 MG Tb24   Commonly known as:  TOPROL XL        Take 25 mg by mouth every day.   Dose:  25 mg                        omeprazole 20 MG delayed-release capsule   Last time this was given:  20 mg on 6/30/2017  5:49 AM   Commonly known as:  PRILOSEC        Take 1 Cap by mouth 2 times daily, before breakfast and dinner.   Dose:  20 mg                        Sevelamer Carbonate 800 MG Tabs        Take 1 Tab by mouth 2 Times a Day.   Dose:  1 Tab                        simvastatin 40 MG Tabs   Last time this was given:  40 mg on 6/29/2017  8:00 PM   Commonly known as:  ZOCOR        Take 40 mg by mouth every evening.   Dose:  40 mg                        TYLENOL 8 HOUR 650 MG CR tablet   Generic drug:  acetaminophen        Take 650 mg by mouth every 8 hours as needed for Mild Pain or Moderate Pain.   Dose:  650 mg                          STOP taking these medications     SENSIPAR 90 MG Tabs   Generic drug:  Cinacalcet HCl               warfarin 3 MG Tabs   Commonly known as:  COUMADIN                       Orders for after discharge      REFERRAL TO SKILLED NURSING FACILITY    Complete by:  As directed              Instructions           Diet / Nutrition:    Follow any diet instructions given to you by your doctor or the dietician, including how much salt (sodium) you are allowed each day.    If you are overweight, talk to your doctor about a weight reduction plan.    Activity:    Remain physically active following your doctor's instructions about exercise and activity.    Rest often.     Any time you become even a little tired or short of breath, SIT DOWN and rest.    Worsening Symptoms:    Report any of the following signs and symptoms to the doctor's office immediately:    *Pain of jaw, arm, or neck  *Chest pain not relieved by medication                               *Dizziness or loss of consciousness  *Difficulty breathing even when at rest   *More tired than usual                                       *Bleeding drainage or swelling of surgical site  *Swelling of feet, ankles, legs or stomach                 *Fever (>100ºF)  *Pink or blood tinged sputum  *Weight gain (3lbs/day or 5lbs /week)           *Shock from internal defibrillator (if applicable)  *Palpitations or irregular heartbeats                *Cool and/or numb extremities    Stroke Awareness    Common Risk Factors for Stroke include:    Age  Atrial Fibrillation  Carotid Artery Stenosis  Diabetes Mellitus  Excessive alcohol consumption  High blood pressure  Overweight   Physical inactivity  Smoking    Warning signs and symptoms of a stroke include:    *Sudden numbness or weakness of the face, arm or leg (especially on one side of the body).  *Sudden confusion, trouble speaking or understanding.  *Sudden trouble seeing in one or both eyes.  *Sudden trouble walking, dizziness, loss of balance or coordination.Sudden severe headache with no known cause.    It is very important to get treatment quickly when a stroke occurs. If you experience any of the above warning signs, call 911  immediately.                   Disclaimer         Quit Smoking / Tobacco Use:    I understand the use of any tobacco products increases my chance of suffering from future heart disease or stroke and could cause other illnesses which may shorten my life. Quitting the use of tobacco products is the single most important thing I can do to improve my health. For further information on smoking / tobacco cessation call a Toll Free Quit Line at 1-614.318.9757 (*National [employer] [place]) or 1-393.502.5250 (American Lung Association) or you can access the web based program at www.lungusa.org.    [place] Tobacco Users Help Line:  (584) 793-4274       Toll Free: 1-473.522.6287  Quit Tobacco Program [facility] (929)618-6272    Crisis Hotline:    [place] Crisis Hotline:  4-741-BKGFJTR or 1-709.365.3271    [place] Crisis Hotline:    1-617.781.2330 or 778-307-5220    Discharge Survey:   Thank you for choosing [facility]. We hope we did everything we could to make your hospital stay a pleasant one. You may be receiving a phone survey and we would appreciate your time and participation in answering the questions. Your input is very valuable to us in our efforts to improve our service to our patients and their families.        My signature on this form indicates that:    1. I have reviewed and understand the above information.  2. My questions regarding this information have been answered to my satisfaction.  3. I have formulated a plan with my discharge nurse to obtain my prescribed medications for home.                  Disclaimer         __________________________________                     __________       ________                       Patient Signature                                                 Date                    Time

## 2017-06-28 NOTE — IP AVS SNAPSHOT
[employer] Access Code: [email or web address]  Expires: 7/14/2017  4:15 AM    Your email address is not on file at [facility].  Email Addresses are required for you to sign up for [employer], please contact [phone] to verify your personal information and to provide your email address prior to attempting to register for [employer].    Skye James  [address] [place] [address]    [employer]  A secure, online tool to manage your health information     [facility]’s [employer]® is a secure, online tool that connects you to your personalized health information from the privacy of your home -- day or night - making it very easy for you to manage your healthcare. Once the activation process is completed, you can even access your medical information using the [employer] katheryn, which is available for free in the Apple Katheryn store or Google Play store.     To learn more about [employer], visit [email or web address]    There are two levels of access available (as shown below):   My Chart Features  [facility] Primary Care Doctor [facility]  Specialists [facility]  Urgent  Care Non-[facility] Primary Care Doctor   Email your healthcare team securely and privately 24/7 X X X    Manage appointments: schedule your next appointment; view details of past/upcoming appointments X      Request prescription refills. X      View recent personal medical records, including lab and immunizations X X X X   View health record, including health history, allergies, medications X X X X   Read reports about your outpatient visits, procedures, consult and ER notes X X X X   See your discharge summary, which is a recap of your hospital and/or ER visit that includes your diagnosis, lab results, and care plan X X  X     How to register for [employer]:  Once your e-mail address has been verified, follow the following steps to sign up for [employer].     1. Go to  [email or web address]  2. Click on the Sign Up Now box, which takes you to the New Member Sign Up page.  You will need to provide the following information:  a. Enter your [employer] Access Code exactly as it appears at the top of this page. (You will not need to use this code after you’ve completed the sign-up process. If you do not sign up before the expiration date, you must request a new code.)   b. Enter your date of birth.   c. Enter your home email address.   d. Click Submit, and follow the next screen’s instructions.  3. Create a AppLovint ID. This will be your [employer]T login ID and cannot be changed, so think of one that is secure and easy to remember.  4. Create a [employer]T password. You can change your password at any time.  5. Enter your Password Reset Question and Answer. This can be used at a later time if you forget your password.   6. Enter your e-mail address. This allows you to receive e-mail notifications when new information is available in [employer]YT.  7. Click Sign Up. You can now view your health information.    For assistance activating your [employer] account, call [phone]

## 2017-06-28 NOTE — H&P
"PATIENT ID:  NAME:  Skye James  MRN:               [id number]  YOB: 1935    PMD: Alistair Gandara M.D.    CC: bloody stool    HPI: Skye James is a 82 y.o. female with chief complaint of bloody stool of one episode today.  Pt is a poor historian, denies noticing black, dark stools or bloody stools. She knows she was sent in   for anemia and a bloody stools this morning. Recent admission where she also had a bloody stool, colonoscopy  done showed diverticulosis. Pt is on coumadin for recurrent DVT.                 PAST MEDICAL HISTORY  Past Medical History   Diagnosis Date   • GOUT    • other      \"thick blood\"   • Hypertension    • CKD (chronic kidney disease)    • Renal disorder    • Arrhythmia    • Heart murmur    • MEDICAL HOME 4/23/2013   • Arthritis      left knee   • DVT (deep venous thrombosis) (CMS-HCC)      history of DVT 3 times   • CATARACT      gabi surgery  completed   • Preoperative clearance 7/15/2013   • Unspecified hemorrhagic conditions      on coumadin   • Dialysis      M, W, F in [place]   • Pneumonia 5/24/2014   • Other specified disorder of intestines      constipation   • ASTHMA      no meds necessary since moving to [place] from [place]   • Asthma      inhalers as needed   • Anemia    • Dental disorder      dentures   • Falls    • Anginal syndrome (CMS-HCC)    • Glaucoma    • Fall    • Indigestion    • Disorder of thyroid    • Chronic anticoagulation    • Moderate mitral regurgitation    • PAH (pulmonary artery hypertension) - liekly due to mitral regurgitation but with history of thromboembolic disease        PAST SURGICAL HISTORY  Past Surgical History   Procedure Laterality Date   • Tonsillectomy     • Hysterectomy laparoscopy       L side   • Av fistula creation  6/22/2010     Performed by MICHA KIRBY at SURGERY [facility] ORS   • Recovery  4/3/2012     Performed by SURGERY, IR-RECOVERY at SURGERY SAME DAY [facility] ORS   • Cataract phaco with iol  6/20/2012     " "Performed by LEO RENDON at SURGERY [facility]S   • Recovery  11/27/2012     Performed by Ir-Recovery Surgery at SURGERY SAME DAY [facility] ORS   • Recovery  7/16/2013     Performed by Ir-Recovery Surgery at SURGERY SAME DAY [facility] ORS   • Recovery  8/5/2014     Performed by Ir-Recovery Surgery at SURGERY SAME DAY [facility] ORS   • Colonoscopy - endo N/A 6/22/2017     Procedure: COLONOSCOPY - ENDO;  Surgeon: Jeff Comer M.D.;  Location: ENDOSCOPY [facility];  Service:        FAMILY HISTORY  Family History   Problem Relation Age of Onset   • Hypertension Mother        SOCIAL HISTORY  Social History   Substance Use Topics   • Smoking status: Never Smoker    • Smokeless tobacco: Never Used   • Alcohol Use: No       ALLERGIES  Allergies as of 06/28/2017 - Jagdeep as Reviewed 06/28/2017   Allergen Reaction Noted   • Nkda [no known drug allergy]  10/27/2007       OUTPATIENT MEDICATIONS  Medication list reviewed    REVIEW OF SYSTEMS  A full 10 point ROS was completed and all pertinent positives and negatives are included in the HPI above by AMA/CMS criteria.    PHYSICAL EXAM:  Blood pressure 171/61, pulse 66, temperature 37 °C (98.6 °F), resp. rate 17, height 1.575 m (5' 2\"), weight 52.164 kg (115 lb), last menstrual period 05/12/1970, SpO2 100 %.  Oxygen: Pulse Oximetry: 100 %, O2 (LPM): 3    Gen: NAD, comfortable  HEENT: NCAT, EOMI, no LAD, no JVD, neck supple, MMM  Heart: +S1/S2, RRR, no murmur appreciated  Lungs: CTAB, no accessory muscle use  GI: NTND, soft, +BS, no rebound/guarding  Extremities: Warm, well-perfused, no cyanosis/clubbing, no peripheral edema  MSK: 5/5 strength in all 4 extremities, sensation intact to light touch  Neuro: AO x 3, CN II-XII grossly intact    LAB TESTS:   Recent Labs      06/26/17   0258  06/28/17   0306  06/28/17   1045   WBC   --   4.7*  5.1   RBC   --   2.05*  2.42*   HEMOGLOBIN  6.9*  6.6*  7.7*   HEMATOCRIT  22.7*  21.1*  25.3*   MCV   --   102.9*  104.5*   MCH   --  "  32.2  31.8   RDW   --   57.8*  59.6*   PLATELETCT   --   132*  171   MPV   --   11.6  11.5   NEUTSPOLYS   --   51.30  58.00   LYMPHOCYTES   --   23.20  22.50   MONOCYTES   --   15.50*  10.50   EOSINOPHILS   --   9.00*  7.80*   BASOPHILS   --   0.60  0.80     Recent Labs      06/28/17   1045   TROPONINI  0.05*     Recent Labs      06/27/17   0309  06/28/17   1045   INR  1.71*  2.22*     Recent Labs      06/28/17   1045   SODIUM  135   POTASSIUM  4.6   CHLORIDE  97   CO2  32   BUN  26*   CREATININE  4.86*   CALCIUM  10.7*   ALBUMIN  3.1*     Estimated GFR/CRCL = Estimated Creatinine Clearance: 7.1 mL/min (by C-G formula based on Cr of 4.86).  Recent Labs      06/28/17   1045   GLUCOSE  101*     FREE T-4   Date/Time Value Ref Range Status   01/25/2009 07:00 AM 0.97 0.89 - 1.76 ng/dL Final     Recent Labs      06/27/17   0309  06/28/17   1045   ASTSGOT   --   19   ALTSGPT   --   11   TBILIRUBIN   --   0.5   ALKPHOSPHAT   --   123*   GLOBULIN   --   3.4   INR  1.71*  2.22*           Invalid input(s): [id number]  VITAMIN B12 -TRUE COBALAMIN   Date/Time Value Ref Range Status   03/05/2016 05:15 PM >1500* 211 - 911 pg/mL Final   03/25/2009 02:41  211 - 911 pg/mL Final     FOLATE -FOLIC ACID   Date/Time Value Ref Range Status   03/05/2016 05:15 PM >24.0 >4.0 ng/mL Final   03/25/2009 02:41 PM >20.00 >3.1 ng/mL Final     FERRITIN   Date/Time Value Ref Range Status   07/30/2009 12:30 PM 99.0 10.0 - 291.0 ng/mL Final   03/25/2009 02:41 PM 76.5 10.0 - 291.0 ng/mL Final     IRON   Date/Time Value Ref Range Status   06/13/2017 04:01 AM 27* 40 - 170 ug/dL Final   10/01/2014 09:36 AM 24* 40 - 170 ug/dL Final   09/14/2009 08:45 AM 43 40 - 170 ug/dL Final     TOTAL IRON BINDING   Date/Time Value Ref Range Status   06/13/2017 04:01 * 250 - 450 ug/dL Final   10/01/2014 09:36 * 250 - 450 ug/dL Final   09/14/2009 08:45  250 - 450 ug/dL Final       IMAGES:  None    ASSESSMENT/PLAN:     1. GIB. Hold coumadin,  serial hgb. Consult GI if persistent.  2. Anemia associated with acute blood loss. Transfuse PRBC, continue Iron  3. ESRD. ED has consulted Nephrology for HD today as she is scheduled  4. Recurrent DVT. Reevaluate continuing Coumadin. IVC filter? I have discussed this  with pt and states she would like to think about it with her sons  5. HTN. Hold meds  6. PPX: Holding Coumadin  7. DISPO: Pt will require 2 midnight stay    CODE STATUS: FULL

## 2017-06-28 NOTE — ED NOTES
Pt to b17 .  Chief Complaint   Patient presents with   • Abnormal Labs     sent from Skilled Nursing for further evaluation of low h/h. pt needs dialysis today but states they wouldnt do r/t abnormal labs    • Dizziness   • Weakness     generalized weakness

## 2017-06-29 LAB
ANION GAP SERPL CALC-SCNC: 2 MMOL/L (ref 0–11.9)
BASOPHILS # BLD AUTO: 0.7 % (ref 0–1.8)
BASOPHILS # BLD: 0.04 K/UL (ref 0–0.12)
BUN SERPL-MCNC: 13 MG/DL (ref 8–22)
CALCIUM SERPL-MCNC: 10.2 MG/DL (ref 8.5–10.5)
CHLORIDE SERPL-SCNC: 100 MMOL/L (ref 96–112)
CO2 SERPL-SCNC: 33 MMOL/L (ref 20–33)
CREAT SERPL-MCNC: 3.21 MG/DL (ref 0.5–1.4)
EOSINOPHIL # BLD AUTO: 0.4 K/UL (ref 0–0.51)
EOSINOPHIL NFR BLD: 7.1 % (ref 0–6.9)
ERYTHROCYTE [DISTWIDTH] IN BLOOD BY AUTOMATED COUNT: 68 FL (ref 35.9–50)
GFR SERPL CREATININE-BSD FRML MDRD: 14 ML/MIN/1.73 M 2
GLUCOSE SERPL-MCNC: 69 MG/DL (ref 65–99)
HCT VFR BLD AUTO: 28 % (ref 37–47)
HGB BLD-MCNC: 8.8 G/DL (ref 12–16)
HGB BLD-MCNC: 9 G/DL (ref 12–16)
HGB BLD-MCNC: 9.5 G/DL (ref 12–16)
IMM GRANULOCYTES # BLD AUTO: 0.01 K/UL (ref 0–0.11)
IMM GRANULOCYTES NFR BLD AUTO: 0.2 % (ref 0–0.9)
INR PPP: 2.21 (ref 0.87–1.13)
LYMPHOCYTES # BLD AUTO: 1.04 K/UL (ref 1–4.8)
LYMPHOCYTES NFR BLD: 18.5 % (ref 22–41)
MCH RBC QN AUTO: 31.1 PG (ref 27–33)
MCHC RBC AUTO-ENTMCNC: 31.4 G/DL (ref 33.6–35)
MCV RBC AUTO: 98.9 FL (ref 81.4–97.8)
MONOCYTES # BLD AUTO: 0.64 K/UL (ref 0–0.85)
MONOCYTES NFR BLD AUTO: 11.4 % (ref 0–13.4)
NEUTROPHILS # BLD AUTO: 3.48 K/UL (ref 2–7.15)
NEUTROPHILS NFR BLD: 62.1 % (ref 44–72)
NRBC # BLD AUTO: 0 K/UL
NRBC BLD AUTO-RTO: 0 /100 WBC
PLATELET # BLD AUTO: 163 K/UL (ref 164–446)
PMV BLD AUTO: 11.9 FL (ref 9–12.9)
POTASSIUM SERPL-SCNC: 4.4 MMOL/L (ref 3.6–5.5)
PROTHROMBIN TIME: 25.2 SEC (ref 12–14.6)
RBC # BLD AUTO: 2.83 M/UL (ref 4.2–5.4)
SODIUM SERPL-SCNC: 135 MMOL/L (ref 135–145)
WBC # BLD AUTO: 5.6 K/UL (ref 4.8–10.8)

## 2017-06-29 PROCEDURE — 85610 PROTHROMBIN TIME: CPT

## 2017-06-29 PROCEDURE — A9270 NON-COVERED ITEM OR SERVICE: HCPCS | Performed by: FAMILY MEDICINE

## 2017-06-29 PROCEDURE — 99233 SBSQ HOSP IP/OBS HIGH 50: CPT | Performed by: HOSPITALIST

## 2017-06-29 PROCEDURE — 80048 BASIC METABOLIC PNL TOTAL CA: CPT

## 2017-06-29 PROCEDURE — 85025 COMPLETE CBC W/AUTO DIFF WBC: CPT

## 2017-06-29 PROCEDURE — 36415 COLL VENOUS BLD VENIPUNCTURE: CPT

## 2017-06-29 PROCEDURE — 770020 HCHG ROOM/CARE - TELE (206)

## 2017-06-29 PROCEDURE — 700102 HCHG RX REV CODE 250 W/ 637 OVERRIDE(OP): Performed by: FAMILY MEDICINE

## 2017-06-29 PROCEDURE — 85018 HEMOGLOBIN: CPT

## 2017-06-29 RX ADMIN — OMEPRAZOLE 20 MG: 20 CAPSULE, DELAYED RELEASE ORAL at 16:52

## 2017-06-29 RX ADMIN — STANDARDIZED SENNA CONCENTRATE AND DOCUSATE SODIUM 2 TABLET: 8.6; 5 TABLET, FILM COATED ORAL at 08:48

## 2017-06-29 RX ADMIN — SIMVASTATIN 40 MG: 40 TABLET, FILM COATED ORAL at 20:00

## 2017-06-29 RX ADMIN — RENAGEL 800 MG: 400 TABLET ORAL at 08:48

## 2017-06-29 RX ADMIN — Medication 325 MG: at 16:52

## 2017-06-29 RX ADMIN — RENAGEL 800 MG: 400 TABLET ORAL at 16:52

## 2017-06-29 RX ADMIN — Medication 325 MG: at 08:48

## 2017-06-29 RX ADMIN — OMEPRAZOLE 20 MG: 20 CAPSULE, DELAYED RELEASE ORAL at 05:52

## 2017-06-29 RX ADMIN — ACETAMINOPHEN 650 MG: 325 TABLET, FILM COATED ORAL at 09:26

## 2017-06-29 ASSESSMENT — ENCOUNTER SYMPTOMS
ABDOMINAL PAIN: 0
DIARRHEA: 0
CONSTIPATION: 0
HEADACHES: 0
CLAUDICATION: 0
HEMOPTYSIS: 0
DIAPHORESIS: 0
FOCAL WEAKNESS: 0
SHORTNESS OF BREATH: 0
PALPITATIONS: 0
WEAKNESS: 1
EYE DISCHARGE: 0
NAUSEA: 0
SENSORY CHANGE: 0
DIZZINESS: 0
FEVER: 0
BACK PAIN: 0
CHILLS: 0
LOSS OF CONSCIOUSNESS: 0
NECK PAIN: 0
BRUISES/BLEEDS EASILY: 0
SPEECH CHANGE: 0
WHEEZING: 0
COUGH: 0
SPUTUM PRODUCTION: 0
SORE THROAT: 0
DEPRESSION: 0
EYE PAIN: 0
VOMITING: 0
MYALGIAS: 0

## 2017-06-29 ASSESSMENT — PAIN SCALES - GENERAL
PAINLEVEL_OUTOF10: 0
PAINLEVEL_OUTOF10: 3
PAINLEVEL_OUTOF10: 0
PAINLEVEL_OUTOF10: ASSUMED PAIN PRESENT
PAINLEVEL_OUTOF10: 0

## 2017-06-29 ASSESSMENT — LIFESTYLE VARIABLES: SUBSTANCE_ABUSE: 0

## 2017-06-29 NOTE — PROGRESS NOTES
[place] to [id number], aaox4, ANDRADE while in bed, denies any discomfort, RUE AVF with dressing cdi with good bruit and thrill, POC discussed, CL diet, concerns about belongings was confirmed to be in [facility], brother Oscar advised at bedside.

## 2017-06-29 NOTE — PROGRESS NOTES
3hr HD started @ 1430 and completed @ 1731,tx well tolerated,VSS,net UF = 2150ml.RUAAVF + bruit/thrill,cannulation sites covered w/ DD,CDI,report given to E. De los Reyes RN.1 unit RBC given w/o adversed reaction noted.

## 2017-06-29 NOTE — PROGRESS NOTES
Received report from day RN assumed care at 1915. Pt A&Ox 4 . Pt states 0 /10 pain at this time. Plan of care discussed with Pt, verbalized understanding.  family present at bedside. Assessment completed. All Pt needs met at this time. Call light within reach, bed alarm on , bed locked and in low position. Will continue to monitor.

## 2017-06-29 NOTE — DISCHARGE PLANNING
Pt has OP hemodialysis at [facility] MW 1215.  Clinic has been notified of hospitalization.

## 2017-06-29 NOTE — CONSULTS
"Chief Complaint   Patient presents with   • Abnormal Labs     sent from Skilled Nursing for further evaluation of low h/h. pt needs dialysis today but states they wouldnt do r/t abnormal labs    • Dizziness   • Weakness     generalized weakness      Consulting: Dr. So    HPI: 82 year old with ESRD MWF, came in with anemia, weak. Did not get dialysis. History of chronic anticoagulation, history of thromboembolic disease. Forgetful, but agrees to transfusion after discussion with patient. She does note no problems with dialysis, and normally does not miss dialysis.    Past Medical History   Diagnosis Date   • GOUT    • other      \"thick blood\"   • Hypertension    • CKD (chronic kidney disease)    • Renal disorder    • Arrhythmia    • Heart murmur    • MEDICAL HOME 4/23/2013   • Arthritis      left knee   • DVT (deep venous thrombosis) (CMS-HCC)      history of DVT 3 times   • CATARACT      gabi surgery  completed   • Preoperative clearance 7/15/2013   • Unspecified hemorrhagic conditions      on coumadin   • Dialysis      M, W, F in [place]   • Pneumonia 5/24/2014   • Other specified disorder of intestines      constipation   • ASTHMA      no meds necessary since moving to [place] from [place]   • Asthma      inhalers as needed   • Anemia    • Dental disorder      dentures   • Falls    • Anginal syndrome (CMS-HCC)    • Glaucoma    • Fall    • Indigestion    • Disorder of thyroid    • Chronic anticoagulation    • Moderate mitral regurgitation    • PAH (pulmonary artery hypertension) - liekly due to mitral regurgitation but with history of thromboembolic disease      Review of Systems   Constitutional: Negative for fever and chills.   Cardiovascular: Negative for chest pain and palpitations.       Physical Exam   Constitutional: She is oriented to person, place, and time and well-developed, well-nourished, and in no distress. No distress.   Cardiovascular: Regular rhythm.    Pulmonary/Chest: Effort normal and breath " sounds normal.   Abdominal: Soft. Bowel sounds are normal.   Musculoskeletal: She exhibits no edema or tenderness.   Neurological: She is alert and oriented to person, place, and time.   Skin: Skin is warm and dry. She is not diaphoretic.   Psychiatric: Judgment normal.   Vitals reviewed.    Results for FRANCISCO MARK (MRN [id number]) as of 6/28/2017 17:31   Ref. Range 6/28/2017 10:45   Sodium Latest Ref Range: 135-145 mmol/L 135   Potassium Latest Ref Range: 3.6-5.5 mmol/L 4.6   Chloride Latest Ref Range:  mmol/L 97   Co2 Latest Ref Range: 20-33 mmol/L 32   Anion Gap Latest Ref Range: 0.0-11.9  6.0   Glucose Latest Ref Range: 65-99 mg/dL 101 (H)   Bun Latest Ref Range: 8-22 mg/dL 26 (H)   Creatinine Latest Ref Range: 0.50-1.40 mg/dL 4.86 (H)   GFR If  Latest Ref Range: >60 mL/min/1.73 m 2 10 (A)   GFR If Non  Latest Ref Range: >60 mL/min/1.73 m 2 9 (A)   Calcium Latest Ref Range: 8.5-10.5 mg/dL 10.7 (H)   AST(SGOT) Latest Ref Range: 12-45 U/L 19   ALT(SGPT) Latest Ref Range: 2-50 U/L 11   Alkaline Phosphatase Latest Ref Range: 30-99 U/L 123 (H)   Total Bilirubin Latest Ref Range: 0.1-1.5 mg/dL 0.5   Albumin Latest Ref Range: 3.2-4.9 g/dL 3.1 (L)   Total Protein Latest Ref Range: 6.0-8.2 g/dL 6.5   Globulin Latest Ref Range: 1.9-3.5 g/dL 3.4   A-G Ratio Latest Units: g/dL 0.9   Troponin I Latest Ref Range: 0.00-0.04 ng/mL 0.05 (H)     A/P:  1. ESRD - HD today, will monitor  2. Anemia - Txf on dialysis  3. Anticoagulation - hold coumadin, noted consider IVC filter

## 2017-06-29 NOTE — PROGRESS NOTES
Pt complaining of pain on her butt, waffle mattress applied, Q2 turns, complaining of knee pain, Merna's RN aware, knee is swollen, placing on pillow was painful and pt wanted it removed, usually takes tylenol for all aches and pains

## 2017-06-29 NOTE — DISCHARGE PLANNING
Care Transition Team Assessment    Completed screening and pt brother was at bedside. The patient confirmed her PCP is Dr. Gandara and her demographics are correct. The patient's POA is her grandson Keon [phone]. All decisions should be discussed with POA. Pt brother answered many screening questions. Pt lives at home with her granddaughter and her , and grandchild but the patient came from [facility] on [address] and has not been home in several months and she has also been at [facility] as well. The family anticipates that the patient will discharge to SNF (preferrably [facility] on [address] because those are where her belongings are). The pt brother stated she has several support systems with her family. The patient's brother in law drives her to her appointments normally and she also received taxi bucks through the [facility] as an alternative. The patient fills her medications at [facility] on [address] and has a walker and a wheelchair. Pt has had oxygen in the past but unsure of DME company. Pt and brother unsure of current discharge needs/plans but encouraged SW to communicate with POA.     Information Source  Orientation : Oriented x 4  Information Given By: Patient  Informant's Name: Skye  Who is responsible for making decisions for patient? : POA  Name(s) of Primary Decision Maker: Keon James [phone]    Elopement Risk  Legal Hold: No  Ambulatory or Self Mobile in Wheelchair: No-Not an Elopement Risk  Elopement Risk: Not at Risk for Elopement    Interdisciplinary Discharge Planning  Does Admitting Nurse Feel This Could be a Complex Discharge?: No  Lives with - Patient's Self Care Capacity: Attendant / Paid Care Giver  Patient or legal guardian wants to designate a caregiver (see row info): No  Support Systems: Family Member(s)  Housing / Facility: Long Term Care Facility  [facility]  Do You Take your Prescribed Medications Regularly: Yes  Able to Return  to Previous ADL's: Future Time w/Therapy  Mobility Issues: Yes  Prior Services: None  Assistance Needed: Unknown at this Time  Durable Medical Equipment: Unknown    Discharge Preparedness  What is your plan after discharge?: Skilled nursing facility  What are your discharge supports?:  (Pt's brother and grandchildren.)  Prior Functional Level: Independent with Activities of Daily Living, Independent with Medication Management, Uses Walker  Difficulity with ADLs: Walking  Difficulty with ADLs Comment: Pt required walker, and wheelchair  Difficulity with IADLs: Driving  Difficulity with IADL Comments: Pt brother drives patient to apts    Functional Assesment  Prior Functional Level: Independent with Activities of Daily Living, Independent with Medication Management, Uses Walker    Finances  Financial Barriers to Discharge: No  Prescription Coverage: Yes ([facility] on [address])    Vision / Hearing Impairment  Vision Impairment : Yes  Right Eye Vision: Impaired, Wears Glasses  Left Eye Vision: Impaired, Wears Glasses  Hearing Impairment : No    Values / Beliefs / Concerns  Values / Beliefs Concerns : No    Domestic Abuse  Have you ever been the victim of abuse or violence?: No  Physical Abuse or Sexual Abuse: No  Verbal Abuse or Emotional Abuse: No  Possible Abuse Reported to:: Not Applicable    Psychological Assessment  History of Substance Abuse: None  History of Psychiatric Problems: No    Discharge Risks or Barriers  Discharge risks or barriers?: No    Anticipated Discharge Information  Anticipated discharge disposition: SNF  Discharge Address: Unsure - potentially back to [facility] on [address]  Discharge Contact Phone Number: [phone]

## 2017-06-29 NOTE — CARE PLAN
Problem: Communication  Goal: The ability to communicate needs accurately and effectively will improve  Outcome: PROGRESSING AS EXPECTED  Pt is alert and oriented x 4 . Pt is oriented to the environment and call light. I have discussed with the pt the plan of care, treatments and medications and the pt verbalizes agreement and understanding. The pt has been able to communicate her needs effectively and has been given the opportunity to ask any questions. All pt needs have been met and questions have been answered at this time. Hourly rounding in place.            Problem: Safety  Goal: Will remain free from injury  Outcome: PROGRESSING AS EXPECTED  Pt assessed at beginning of shift. Pt determined to be 1x assist with walker . Fall precautions in place. Call light and personal possessions in reach, bed alarm on . Hourly rounding in place.

## 2017-06-29 NOTE — RESPIRATORY CARE
COPD EDUCATION by COPD CLINICAL EDUCATOR  6/29/2017 at 7:36 AM by Sarah Reynolds     Patient reviewed by COPD education team. Patient does not qualify for COPD program.

## 2017-06-30 VITALS
BODY MASS INDEX: 23.45 KG/M2 | RESPIRATION RATE: 18 BRPM | SYSTOLIC BLOOD PRESSURE: 144 MMHG | WEIGHT: 127.43 LBS | HEART RATE: 71 BPM | DIASTOLIC BLOOD PRESSURE: 54 MMHG | TEMPERATURE: 98 F | OXYGEN SATURATION: 97 % | HEIGHT: 62 IN

## 2017-06-30 LAB — HGB BLD-MCNC: 8.6 G/DL (ref 12–16)

## 2017-06-30 PROCEDURE — 99239 HOSP IP/OBS DSCHRG MGMT >30: CPT | Performed by: HOSPITALIST

## 2017-06-30 PROCEDURE — 85018 HEMOGLOBIN: CPT

## 2017-06-30 PROCEDURE — 36415 COLL VENOUS BLD VENIPUNCTURE: CPT

## 2017-06-30 PROCEDURE — 700102 HCHG RX REV CODE 250 W/ 637 OVERRIDE(OP): Performed by: FAMILY MEDICINE

## 2017-06-30 PROCEDURE — 93970 EXTREMITY STUDY: CPT

## 2017-06-30 PROCEDURE — 93970 EXTREMITY STUDY: CPT | Mod: 26 | Performed by: SURGERY

## 2017-06-30 PROCEDURE — A9270 NON-COVERED ITEM OR SERVICE: HCPCS | Performed by: FAMILY MEDICINE

## 2017-06-30 PROCEDURE — 90935 HEMODIALYSIS ONE EVALUATION: CPT

## 2017-06-30 RX ADMIN — OMEPRAZOLE 20 MG: 20 CAPSULE, DELAYED RELEASE ORAL at 05:49

## 2017-06-30 RX ADMIN — Medication 325 MG: at 07:29

## 2017-06-30 RX ADMIN — RENAGEL 800 MG: 400 TABLET ORAL at 07:29

## 2017-06-30 ASSESSMENT — ENCOUNTER SYMPTOMS
CHILLS: 0
FEVER: 0
PALPITATIONS: 0

## 2017-06-30 ASSESSMENT — PAIN SCALES - GENERAL
PAINLEVEL_OUTOF10: 3

## 2017-06-30 NOTE — CARE PLAN
Problem: Mobility  Goal: Risk for activity intolerance will decrease  Intervention: Provide rest periods  Bed bound  Intervention: Encourage patient to increase activity level in collaboration with Interdisciplinary Team  Maybe get pt involved?

## 2017-06-30 NOTE — DISCHARGE PLANNING
DME referral sent to [facility] per choice form. Pt states she is already on service with them.

## 2017-06-30 NOTE — DISCHARGE INSTRUCTIONS
Discharge Instructions    Discharged to group home by [facility] with escort. Discharged via ambulance, hospital escort: Yes.  Special equipment needed: Oxygen    Be sure to schedule a follow-up appointment with your primary care doctor or any specialists as instructed.     Discharge Plan:   Diet Plan: Discussed  Activity Level: Discussed  Confirmed Follow up Appointment: No (Comments)  Confirmed Symptoms Management: Discussed  Medication Reconciliation Updated: Yes  Influenza Vaccine Indication: Not indicated: Previously immunized this influenza season and > 8 years of age    I understand that a diet low in cholesterol, fat, and sodium is recommended for good health. Unless I have been given specific instructions below for another diet, I accept this instruction as my diet prescription.   Other diet: cardiac    Special Instructions: None    · Is patient discharged on Warfarin / Coumadin?   No     · Is patient Post Blood Transfusion?  No    Depression / Suicide Risk    As you are discharged from this [facility], it is important to learn how to keep safe from harming yourself.    Recognize the warning signs:  · Abrupt changes in personality, positive or negative- including increase in energy   · Giving away possessions  · Change in eating patterns- significant weight changes-  positive or negative  · Change in sleeping patterns- unable to sleep or sleeping all the time   · Unwillingness or inability to communicate  · Depression  · Unusual sadness, discouragement and loneliness  · Talk of wanting to die  · Neglect of personal appearance   · Rebelliousness- reckless behavior  · Withdrawal from people/activities they love  · Confusion- inability to concentrate     If you or a loved one observes any of these behaviors or has concerns about self-harm, here's what you can do:  · Talk about it- your feelings and reasons for harming yourself  · Remove any means that you might use to hurt yourself (examples:  pills, rope, extension cords, firearm)  · Get professional help from the community (Mental Health, Substance Abuse, psychological counseling)  · Do not be alone:Call your Safe Contact- someone whom you trust who will be there for you.  · Call your local CRISIS HOTLINE 423-6804 or 146-958-5730  · Call your local Children's Mobile Crisis Response Team [place] (734) 664-4576 or www.M. [place]  · Call the toll free National Suicide Prevention Hotlines   · National Suicide Prevention Lifeline 280-386-HLAA (9997)  · National Hope Line Network 800-SUICIDE (360-6738)

## 2017-06-30 NOTE — PROGRESS NOTES
Bed in low position, call light within reach, pt repositioned, chronic pain in knee, but does not want anything at the moment, eating well this am

## 2017-06-30 NOTE — DISCHARGE PLANNING
Transport has been arranged with Claudia at [facility] for 4:00 today via Van transport. Pt to discharge to [facility]. JENNIFER Torres notified via voicemail.

## 2017-06-30 NOTE — ASSESSMENT & PLAN NOTE
No further melena noted  Recent colonoscopy negative last admission.  Occult blood stool, HG q 8.  PPI  Holding coumadin for now.

## 2017-06-30 NOTE — DISCHARGE PLANNING
Received call from Lidia at [facility], referral has been accepted. JENNIFER Torres notified.    Received Transport communication form, form e-mailed to [facility] to arrange Van Transport to [facility].

## 2017-06-30 NOTE — PROGRESS NOTES
Pt slept a few hour this afternoon, is not complaining of any pain on her bottom or knee at this time, sitting up in bed eating dinner, forgetful

## 2017-06-30 NOTE — DISCHARGE PLANNING
Transitional Care Navigator:    Pt's POA, Keon, agreeable to have pt return to [facility]. Choice form completed and given to [facility]. TCN to follow as needed.

## 2017-06-30 NOTE — DISCHARGE SUMMARY
CHIEF COMPLAINT ON ADMISSION  Chief Complaint   Patient presents with   • Abnormal Labs     sent from Skilled Nursing for further evaluation of low h/h. pt needs dialysis today but states they wouldnt do r/t abnormal labs    • Dizziness   • Weakness     generalized weakness        CODE STATUS  Full Code    HPI & HOSPITAL COURSE  This is a 82 y.o. year old female admitted 6/28/17 with bloody stool.  She had a recent admission for same with negative colonoscopy:  Diverticulosis only.  She has ESRD on HD MWF.  She is also on therapeutic coumadin for recurrent DVT/PE.  However, given patient's significant dementia, she is at high risk of bleeding in the event of a fall as well.  Her last positive LE ultrasound was 2/19/13 for a very tiny thrombus seen right mid calf.  Previous NM- lung scans in 2004 low probability for PE.  Patient was offered IVC filter and refused.  I do not believe at this point given her repeat GI bleeding episodes that she needs to continue coumadin, however her PCP or pulmonologist may have pressing reasons to continue coumadin in the future.   A repeat u/s LEs 6/30 negative for DVT.     Therefore, she is discharged in fair and stable condition for further post-acute management.     SPECIFIC OUTPATIENT FOLLOW-UP  Follow up with HD MWF and nephrologist, Dr. Najjar.  Recommend follow up Hg in 3 days.  Hold on coumadin for at least 1 week.  Please follow up on U/s lower extremities performed on 6/30/17 negative b/l for DVT.    DISCHARGE PROBLEM LIST  Principal Problem:    GIB (gastrointestinal bleeding) POA: Yes  Active Problems:    ESRD (end stage renal disease) on dialysis (CMS-HCC) POA: Yes    Chronic anticoagulation (Chronic) POA: Yes    Hemorrhagic disorder due to extrinsic circulating anticoagulants (CMS-HCC) POA: Yes    History of DVT of lower extremity POA: Yes    PAH (pulmonary artery hypertension) - liekly due to mitral regurgitation but with history of thromboembolic disease (Chronic) POA:  Yes    Dementia (Chronic) POA: Yes  Resolved Problems:    * No resolved hospital problems. *      FOLLOW UP  Future Appointments  Date Time Provider Department Center   7/1/2017 10:25 AM LAB [facility] None   7/19/2017 9:15 AM Fadi Najjar, M.D. [facility]     No follow-up provider specified.    MEDICATIONS ON DISCHARGE   Skye James Kimmy   Home Medication Instructions MYRIAM:[id number]    Printed on:06/30/17 6165   Medication Information                      acetaminophen (TYLENOL 8 HOUR) 650 MG CR tablet  Take 650 mg by mouth every 8 hours as needed for Mild Pain or Moderate Pain.             ferrous sulfate 325 (65 FE) MG tablet  Take 1 Tab by mouth every morning with breakfast.             hydrALAZINE (APRESOLINE) 10 MG Tab  Take 1 Tab by mouth every 8 hours.             isosorbide dinitrate (ISORDIL) 10 MG Tab  Take 1 Tab by mouth 3 times a day.             Lactobacillus Rhamnosus, GG, (CULTURELLE PO)  Take 1 Tab by mouth 3 times a day. Chewable 1 million cell             losartan (COZAAR) 25 MG Tab  Take 25 mg by mouth every day.             metoprolol SR (TOPROL XL) 25 MG TABLET SR 24 HR  Take 25 mg by mouth every day.             omeprazole (PRILOSEC) 20 MG delayed-release capsule  Take 1 Cap by mouth 2 times daily, before breakfast and dinner.             Sevelamer Carbonate 800 MG Tab  Take 1 Tab by mouth 2 Times a Day.             simvastatin (ZOCOR) 40 MG Tab  Take 40 mg by mouth every evening.                 DIET  Orders Placed This Encounter   Procedures   • Diet Order     Standing Status: Standing      Number of Occurrences: 1      Standing Expiration Date:      Order Specific Question:  Diet:     Answer:  Renal [8]       ACTIVITY  As tolerated and directed by skilled nursing.  Class 3 - comfortable at rest but have symptoms with less-than-ordinary effort.    LINES, DRAINS, AND WOUNDS  This is an automated list. Peripheral IVs will be removed prior to discharge.  PIV Group Left Upper Arm 20g  Saline Lock (Active)   Line Secured Taped;Transparent 6/30/2017  8:00 AM   Site Condition / Description Assessed;Patent;Clean;Dry;Intact 6/30/2017  8:00 AM   Dressing Type / Description Tegaderm Advanced;Clean;Dry;Intact 6/30/2017  8:00 AM   Dressing Status Observed 6/30/2017  8:00 AM   Saline Locked Yes 6/30/2017  8:00 AM   Infiltration Grading Used by [facility] and [facility] 0 6/30/2017  8:00 AM   Phlebitis Scale (Used by [facility]) 0 6/30/2017  8:00 AM       Dialysis Access Group Right;Upper Arm AV Fistula (Active)       Dialysis Access Group Right;Upper Arm AV Fistula (Active)   Access Site Clean;Dry;Intact 6/30/2017 12:47 PM   Dressing Type / Description Gauze;Clean;Dry;Intact 6/30/2017 12:47 PM   Dressing Status Changed 6/30/2017 12:47 PM   AV Fistula Bruit Present;Thrill Present 6/30/2017 12:47 PM   Dialysis Performed Yes 6/30/2017 12:47 PM       Surgical Incision Group Right;Upper Arm Incision (Active)                  MENTAL STATUS ON TRANSFER  Level of Consciousness: Alert  Orientation : Oriented x 4  Speech: Speech Clear    CONSULTATIONS  6/28/17 Dr. Marquez:  Nephrology.    PROCEDURES  none    LABORATORY  Lab Results   Component Value Date/Time    SODIUM 135 06/29/2017 05:01 AM    POTASSIUM 4.4 06/29/2017 05:01 AM    CHLORIDE 100 06/29/2017 05:01 AM    CO2 33 06/29/2017 05:01 AM    GLUCOSE 69 06/29/2017 05:01 AM    BUN 13 06/29/2017 05:01 AM    CREATININE 3.21* 06/29/2017 05:01 AM        Lab Results   Component Value Date/Time    WBC 5.6 06/29/2017 05:01 AM    HEMOGLOBIN 8.6* 06/30/2017 05:41 AM    HEMATOCRIT 28.0* 06/29/2017 05:01 AM    PLATELET COUNT 163* 06/29/2017 05:01 AM        Total time of the discharge process exceeds 40 minutes.

## 2017-06-30 NOTE — PROGRESS NOTES
Hospital Medicine Progress Note, Adult, Complex               Author: Nakul Marquez Date & Time created: 6/30/2017  10:46 AM     Interval History:  82 year old with ESRD, anemia, was on anticoagulation. Now improved, seen on HD and tolerating tx  No current issues    Review of Systems:  Review of Systems   Constitutional: Negative for fever and chills.   Cardiovascular: Negative for chest pain and palpitations.       Physical Exam:  Physical Exam   Constitutional: She appears well-developed and well-nourished.   Cardiovascular: Normal rate and regular rhythm.    Pulmonary/Chest: Effort normal and breath sounds normal.   Musculoskeletal: She exhibits no edema or tenderness.   Skin: Skin is warm and dry.       Labs:        Invalid input(s): [id number]  Recent Labs      06/28/17   1045   TROPONINI  0.05*     Recent Labs      06/28/17   1045  06/28/17 2021 06/29/17   0501   SODIUM  135   --   135   POTASSIUM  4.6  4.2  4.4   CHLORIDE  97   --   100   CO2  32   --   33   BUN  26*   --   13   CREATININE  4.86*   --   3.21*   CALCIUM  10.7*   --   10.2     Recent Labs      06/28/17   1045  06/29/17   0501   ALTSGPT  11   --    ASTSGOT  19   --    ALKPHOSPHAT  123*   --    TBILIRUBIN  0.5   --    GLUCOSE  101*  69     Recent Labs      06/28/17   0306  06/28/17   1045   06/29/17   0501  06/29/17   1249  06/29/17   2049  06/30/17   0541   RBC  2.05*  2.42*   --   2.83*   --    --    --    HEMOGLOBIN  6.6*  7.7*   < >  8.8*  9.5*  9.0*  8.6*   HEMATOCRIT  21.1*  25.3*   --   28.0*   --    --    --    PLATELETCT  132*  171   --   163*   --    --    --    PROTHROMBTM   --   25.3*   --   25.2*   --    --    --    INR   --   2.22*   --   2.21*   --    --    --     < > = values in this interval not displayed.     Recent Labs      06/28/17   0306  06/28/17   1045  06/29/17   0501   WBC  4.7*  5.1  5.6   NEUTSPOLYS  51.30  58.00  62.10   LYMPHOCYTES  23.20  22.50  18.50*   MONOCYTES  15.50*  10.50  11.40   EOSINOPHILS   9.00*  7.80*  7.10*   BASOPHILS  0.60  0.80  0.70   ASTSGOT   --   19   --    ALTSGPT   --   11   --    ALKPHOSPHAT   --   123*   --    TBILIRUBIN   --   0.5   --            Hemodynamics:  Temp (24hrs), Av.3 °C (97.4 °F), Min:35.9 °C (96.6 °F), Max:36.6 °C (97.9 °F)  Temperature: 36.6 °C (97.9 °F)  Pulse  Av.7  Min: 58  Max: 74   Blood Pressure : 140/53 mmHg     Respiratory:    Respiration: 16, Pulse Oximetry: 100 %           Fluids:  No intake or output data in the 24 hours ending 17 1046  Weight: 57.8 kg (127 lb 6.8 oz)  GI/Nutrition:  Orders Placed This Encounter   Procedures   • Diet Order     Standing Status: Standing      Number of Occurrences: 1      Standing Expiration Date:      Order Specific Question:  Diet:     Answer:  Renal [8]     Medical Decision Making, by Problem:  Active Hospital Problems    Diagnosis   • *GIB (gastrointestinal bleeding) [K92.2]   • ESRD (end stage renal disease) on dialysis (CMS-[facility]) [N18.6, Z99.2]   • Hemorrhagic disorder due to extrinsic circulating anticoagulants (CMS-HCC) [D68.32]   • Chronic anticoagulation [Z79.01]   • Dementia [F03.90]   • PAH (pulmonary artery hypertension) - liekly due to mitral regurgitation but with history of thromboembolic disease [I27.2]   • History of DVT of lower extremity [Z86.718]     1. ESRD - Doing well with HD, will follow. Outpatient HD when discharged    Core Measures

## 2017-06-30 NOTE — PROGRESS NOTES
[facility] Progress Note    Hemodialysis treatment ordered today per Dr. Marquez x 3 hours. Treatment initiated at 0945, ended at 1247.     Patient tolerated tx; see paper flow sheet for details.     Net UF 2000 mL.     Needles removed from access site. Dressings applied and sites held x 10 minutes; verified no bleeding. Positive bruit/thrill post tx. Staff RN to monitor AVF for breakthrough bleeding. Should breakthrough bleeding occur, staff RN to apply pressure to access sites until bleeding resolved. Notify Dialysis and Nephrologist for follow-up.    Report given to Primary RN.

## 2017-06-30 NOTE — PROGRESS NOTES
[facility] Hospitalist Progress Note    Date of Service: 2017    Chief Complaint  82 y.o. female admitted 2017 with bloody stool, ESRD on HD, recurrent DVT on coumadin from SNF.  She had a recent admission for same, colonoscopy with diverticulosis.    Interval Problem Update  :  No further drop in Hg, no further melena seen.  Ordered stool occult blood.  Received blood transfusion during dialysis.  Consideration for IVCF, but patient wants to talk with family first.    Consultants/Specialty  Dr. Marquez:  nephrology    Disposition  Back to SNF once Hg stable.  No further drop.  No melena.        Review of Systems   Constitutional: Positive for malaise/fatigue. Negative for fever, chills and diaphoresis.   HENT: Negative for congestion and sore throat.    Eyes: Negative for pain and discharge.   Respiratory: Negative for cough, hemoptysis, sputum production, shortness of breath and wheezing.    Cardiovascular: Negative for chest pain, palpitations, claudication and leg swelling.   Gastrointestinal: Negative for nausea, vomiting, abdominal pain, diarrhea, constipation and melena.   Genitourinary: Negative for dysuria, urgency and frequency.   Musculoskeletal: Negative for myalgias, back pain, joint pain and neck pain.   Skin: Negative for itching and rash.   Neurological: Positive for weakness. Negative for dizziness, sensory change, speech change, focal weakness, loss of consciousness and headaches.   Endo/Heme/Allergies: Does not bruise/bleed easily.   Psychiatric/Behavioral: Negative for depression, suicidal ideas and substance abuse.      Physical Exam  Laboratory/Imaging   Hemodynamics  Temp (24hrs), Av.3 °C (97.4 °F), Min:36.1 °C (97 °F), Max:36.6 °C (97.8 °F)   Temperature: 36.6 °C (97.8 °F)  Pulse  Av.6  Min: 58  Max: 74    Blood Pressure : 140/58 mmHg      Respiratory      Respiration: 16, Pulse Oximetry: 93 %             Fluids  No intake or output data in the 24 hours ending 17  1806    Nutrition  Orders Placed This Encounter   Procedures   • Diet Order     Standing Status: Standing      Number of Occurrences: 1      Standing Expiration Date:      Order Specific Question:  Diet:     Answer:  Clear Liquids - No Red Foods [12]     Physical Exam   Constitutional: She is oriented to person, place, and time. She appears well-developed and well-nourished. No distress.   HENT:   Head: Normocephalic and atraumatic.   Nose: Nose normal.   Mouth/Throat: Oropharynx is clear and moist. No oropharyngeal exudate.   Eyes: Conjunctivae and EOM are normal. Pupils are equal, round, and reactive to light. Right eye exhibits no discharge. Left eye exhibits no discharge. No scleral icterus.   Neck: Normal range of motion. Neck supple. No JVD present. No tracheal deviation present. No thyromegaly present.   Cardiovascular: Normal rate, regular rhythm, normal heart sounds and intact distal pulses.  Exam reveals no gallop and no friction rub.    No murmur heard.  Pulmonary/Chest: Effort normal and breath sounds normal. No stridor. No respiratory distress. She has no wheezes. She has no rales. She exhibits no tenderness.   Abdominal: Soft. Bowel sounds are normal. She exhibits no distension and no mass. There is no tenderness. There is no rebound and no guarding.   Musculoskeletal: Normal range of motion. She exhibits no edema or tenderness.   AVF right upper arm normal exam.   Lymphadenopathy:     She has no cervical adenopathy.   Neurological: She is alert and oriented to person, place, and time. No cranial nerve deficit. She exhibits normal muscle tone. Coordination normal.   Skin: Skin is warm and dry. No rash noted. She is not diaphoretic. No erythema.   Psychiatric: She has a normal mood and affect. Her behavior is normal. Judgment and thought content normal.   Nursing note and vitals reviewed.      Recent Labs      06/28/17   0306  06/28/17   1045  06/28/17   2021  06/29/17   0501  06/29/17   1249   WBC   4.7*  5.1   --   5.6   --    RBC  2.05*  2.42*   --   2.83*   --    HEMOGLOBIN  6.6*  7.7*  9.4*  8.8*  9.5*   HEMATOCRIT  21.1*  25.3*   --   28.0*   --    MCV  102.9*  104.5*   --   98.9*   --    MCH  32.2  31.8   --   31.1   --    MCHC  31.3*  30.4*   --   31.4*   --    RDW  57.8*  59.6*   --   68.0*   --    PLATELETCT  132*  171   --   163*   --    MPV  11.6  11.5   --   11.9   --      Recent Labs      06/28/17   1045  06/28/17 2021 06/29/17   0501   SODIUM  135   --   135   POTASSIUM  4.6  4.2  4.4   CHLORIDE  97   --   100   CO2  32   --   33   GLUCOSE  101*   --   69   BUN  26*   --   13   CREATININE  4.86*   --   3.21*   CALCIUM  10.7*   --   10.2     Recent Labs      06/27/17   0309  06/28/17   1045  06/29/17   0501   INR  1.71*  2.22*  2.21*                  Assessment/Plan     * GIB (gastrointestinal bleeding) (present on admission)  Assessment & Plan  No further melena noted  Recent colonoscopy negative last admission.  Occult blood stool, HG q 8.  PPI  Holding coumadin for now.      ESRD (end stage renal disease) on dialysis ([facility]) (present on admission)  Assessment & Plan  Dr. Marquez consulted, continuing HD     History of DVT of lower extremity (present on admission)  Assessment & Plan  Consideration for IVCF, but patient wants to discuss with family first.      PAH (pulmonary artery hypertension) - liekly due to mitral regurgitation but with history of thromboembolic disease (present on admission)  Assessment & Plan  Holding coumadin since admission 6/28 for report of melena    Dementia (present on admission)  Assessment & Plan  stable    Chronic anticoagulation (present on admission)  Assessment & Plan  Coumadin for recurrent DVT, PEs.      Core Measures

## 2017-06-30 NOTE — PROGRESS NOTES
PER MD: Will pass along to staff at SNF:  Recommend to hold coumadin at this time, may resume at some point, but patient remains high risk for falls given her dementia and for repeat GI bleeding.

## 2017-06-30 NOTE — DISCHARGE PLANNING
SW notified RN and pt's grandson/POA Keon of 4:00 transfer to [facility]. Transfer packet and cobra complete.

## 2017-07-01 ENCOUNTER — HOSPITAL ENCOUNTER (OUTPATIENT)
Dept: LAB | Facility: MEDICAL CENTER | Age: 82
End: 2017-07-01
Attending: INTERNAL MEDICINE
Payer: COMMERCIAL

## 2017-07-01 ENCOUNTER — APPOINTMENT (OUTPATIENT)
Dept: RADIOLOGY | Facility: IMAGING CENTER | Age: 82
End: 2017-07-01
Attending: FAMILY MEDICINE
Payer: MEDICARE

## 2017-07-01 DIAGNOSIS — M79.89 SWELLING, LIMB: ICD-10-CM

## 2017-07-01 LAB
ANION GAP SERPL CALC-SCNC: 4 MMOL/L (ref 0–11.9)
BASOPHILS # BLD AUTO: 0.9 % (ref 0–1.8)
BASOPHILS # BLD: 0.05 K/UL (ref 0–0.12)
BUN SERPL-MCNC: 14 MG/DL (ref 8–22)
CALCIUM SERPL-MCNC: 9.5 MG/DL (ref 8.5–10.5)
CHLORIDE SERPL-SCNC: 101 MMOL/L (ref 96–112)
CO2 SERPL-SCNC: 32 MMOL/L (ref 20–33)
CREAT SERPL-MCNC: 3.41 MG/DL (ref 0.5–1.4)
EOSINOPHIL # BLD AUTO: 0.43 K/UL (ref 0–0.51)
EOSINOPHIL NFR BLD: 7.7 % (ref 0–6.9)
ERYTHROCYTE [DISTWIDTH] IN BLOOD BY AUTOMATED COUNT: 63.6 FL (ref 35.9–50)
GFR SERPL CREATININE-BSD FRML MDRD: 13 ML/MIN/1.73 M 2
GLUCOSE SERPL-MCNC: 72 MG/DL (ref 65–99)
HCT VFR BLD AUTO: 26 % (ref 37–47)
HGB BLD-MCNC: 8.3 G/DL (ref 12–16)
IMM GRANULOCYTES # BLD AUTO: 0.02 K/UL (ref 0–0.11)
IMM GRANULOCYTES NFR BLD AUTO: 0.4 % (ref 0–0.9)
LYMPHOCYTES # BLD AUTO: 1.24 K/UL (ref 1–4.8)
LYMPHOCYTES NFR BLD: 22.2 % (ref 22–41)
MCH RBC QN AUTO: 32.2 PG (ref 27–33)
MCHC RBC AUTO-ENTMCNC: 31.9 G/DL (ref 33.6–35)
MCV RBC AUTO: 100.8 FL (ref 81.4–97.8)
MONOCYTES # BLD AUTO: 0.72 K/UL (ref 0–0.85)
MONOCYTES NFR BLD AUTO: 12.9 % (ref 0–13.4)
NEUTROPHILS # BLD AUTO: 3.13 K/UL (ref 2–7.15)
NEUTROPHILS NFR BLD: 55.9 % (ref 44–72)
NRBC # BLD AUTO: 0 K/UL
NRBC BLD AUTO-RTO: 0 /100 WBC
PLATELET # BLD AUTO: 146 K/UL (ref 164–446)
PMV BLD AUTO: 10.8 FL (ref 9–12.9)
POTASSIUM SERPL-SCNC: 4.2 MMOL/L (ref 3.6–5.5)
RBC # BLD AUTO: 2.58 M/UL (ref 4.2–5.4)
SODIUM SERPL-SCNC: 137 MMOL/L (ref 135–145)
WBC # BLD AUTO: 5.6 K/UL (ref 4.8–10.8)

## 2017-07-03 LAB
HCT VFR BLD AUTO: 26.9 % (ref 37–47)
HGB BLD-MCNC: 8.3 G/DL (ref 12–16)

## 2017-07-04 ENCOUNTER — APPOINTMENT (OUTPATIENT)
Dept: RADIOLOGY | Facility: IMAGING CENTER | Age: 82
End: 2017-07-04
Attending: FAMILY MEDICINE
Payer: MEDICARE

## 2017-07-04 DIAGNOSIS — R06.02 SHORTNESS OF BREATH: ICD-10-CM

## 2017-07-11 ENCOUNTER — APPOINTMENT (OUTPATIENT)
Dept: RADIOLOGY | Facility: IMAGING CENTER | Age: 82
End: 2017-07-11
Attending: INTERNAL MEDICINE
Payer: MEDICARE

## 2017-07-11 DIAGNOSIS — M79.662 PAIN OF LEFT KNEE AND LOWER LEG: ICD-10-CM

## 2017-07-11 DIAGNOSIS — M25.562 PAIN OF LEFT KNEE AND LOWER LEG: ICD-10-CM

## 2017-07-12 LAB
CHOLEST SERPL-MCNC: 109 MG/DL (ref 100–199)
HCT VFR BLD AUTO: 27.7 % (ref 37–47)
HDLC SERPL-MCNC: 52 MG/DL
HGB BLD-MCNC: 8.6 G/DL (ref 12–16)
LDLC SERPL CALC-MCNC: 42 MG/DL
TRIGL SERPL-MCNC: 77 MG/DL (ref 0–149)
URATE SERPL-MCNC: 3.2 MG/DL (ref 1.9–8.2)

## 2017-07-17 ENCOUNTER — APPOINTMENT (OUTPATIENT)
Dept: RADIOLOGY | Facility: MEDICAL CENTER | Age: 82
DRG: 640 | End: 2017-07-17
Attending: EMERGENCY MEDICINE
Payer: MEDICARE

## 2017-07-17 ENCOUNTER — RESOLUTE PROFESSIONAL BILLING HOSPITAL PROF FEE (OUTPATIENT)
Dept: HOSPITALIST | Facility: MEDICAL CENTER | Age: 82
End: 2017-07-17
Payer: MEDICARE

## 2017-07-17 ENCOUNTER — HOSPITAL ENCOUNTER (INPATIENT)
Facility: MEDICAL CENTER | Age: 82
LOS: 3 days | DRG: 640 | End: 2017-07-20
Attending: EMERGENCY MEDICINE | Admitting: INTERNAL MEDICINE
Payer: MEDICARE

## 2017-07-17 DIAGNOSIS — E83.52 HYPERCALCEMIA: ICD-10-CM

## 2017-07-17 DIAGNOSIS — J96.01 ACUTE RESPIRATORY FAILURE WITH HYPOXIA (HCC): ICD-10-CM

## 2017-07-17 DIAGNOSIS — N18.9 CRF (CHRONIC RENAL FAILURE), UNSPECIFIED STAGE: ICD-10-CM

## 2017-07-17 DIAGNOSIS — K59.00 CONSTIPATION, UNSPECIFIED CONSTIPATION TYPE: ICD-10-CM

## 2017-07-17 DIAGNOSIS — E87.5 HYPERKALEMIA: ICD-10-CM

## 2017-07-17 DIAGNOSIS — K92.2 GASTROINTESTINAL HEMORRHAGE, UNSPECIFIED GASTROINTESTINAL HEMORRHAGE TYPE: ICD-10-CM

## 2017-07-17 LAB
ALBUMIN SERPL BCP-MCNC: 3.6 G/DL (ref 3.2–4.9)
ALBUMIN/GLOB SERPL: 1 G/DL
ALP SERPL-CCNC: 134 U/L (ref 30–99)
ALT SERPL-CCNC: 11 U/L (ref 2–50)
ANION GAP SERPL CALC-SCNC: 7 MMOL/L (ref 0–11.9)
APTT PPP: 27.5 SEC (ref 24.7–36)
AST SERPL-CCNC: 21 U/L (ref 12–45)
BASOPHILS # BLD AUTO: 0.6 % (ref 0–1.8)
BASOPHILS # BLD: 0.04 K/UL (ref 0–0.12)
BILIRUB SERPL-MCNC: 0.7 MG/DL (ref 0.1–1.5)
BUN SERPL-MCNC: 36 MG/DL (ref 8–22)
CALCIUM SERPL-MCNC: 13 MG/DL (ref 8.5–10.5)
CHLORIDE SERPL-SCNC: 93 MMOL/L (ref 96–112)
CO2 SERPL-SCNC: 33 MMOL/L (ref 20–33)
CREAT SERPL-MCNC: 5.71 MG/DL (ref 0.5–1.4)
EOSINOPHIL # BLD AUTO: 0.18 K/UL (ref 0–0.51)
EOSINOPHIL NFR BLD: 2.8 % (ref 0–6.9)
ERYTHROCYTE [DISTWIDTH] IN BLOOD BY AUTOMATED COUNT: 62.3 FL (ref 35.9–50)
GFR SERPL CREATININE-BSD FRML MDRD: 7 ML/MIN/1.73 M 2
GLOBULIN SER CALC-MCNC: 3.6 G/DL (ref 1.9–3.5)
GLUCOSE SERPL-MCNC: 63 MG/DL (ref 65–99)
HCT VFR BLD AUTO: 34.9 % (ref 37–47)
HGB BLD-MCNC: 10.6 G/DL (ref 12–16)
IMM GRANULOCYTES # BLD AUTO: 0.01 K/UL (ref 0–0.11)
IMM GRANULOCYTES NFR BLD AUTO: 0.2 % (ref 0–0.9)
INR PPP: 1.05 (ref 0.87–1.13)
LYMPHOCYTES # BLD AUTO: 0.81 K/UL (ref 1–4.8)
LYMPHOCYTES NFR BLD: 12.8 % (ref 22–41)
MCH RBC QN AUTO: 31.5 PG (ref 27–33)
MCHC RBC AUTO-ENTMCNC: 30.4 G/DL (ref 33.6–35)
MCV RBC AUTO: 103.9 FL (ref 81.4–97.8)
MONOCYTES # BLD AUTO: 0.54 K/UL (ref 0–0.85)
MONOCYTES NFR BLD AUTO: 8.5 % (ref 0–13.4)
NEUTROPHILS # BLD AUTO: 4.76 K/UL (ref 2–7.15)
NEUTROPHILS NFR BLD: 75.1 % (ref 44–72)
NRBC # BLD AUTO: 0 K/UL
NRBC BLD AUTO-RTO: 0 /100 WBC
PLATELET # BLD AUTO: 152 K/UL (ref 164–446)
PMV BLD AUTO: 10.8 FL (ref 9–12.9)
POTASSIUM SERPL-SCNC: 6.3 MMOL/L (ref 3.6–5.5)
PROT SERPL-MCNC: 7.2 G/DL (ref 6–8.2)
PROTHROMBIN TIME: 14 SEC (ref 12–14.6)
RBC # BLD AUTO: 3.36 M/UL (ref 4.2–5.4)
SODIUM SERPL-SCNC: 133 MMOL/L (ref 135–145)
WBC # BLD AUTO: 6.3 K/UL (ref 4.8–10.8)

## 2017-07-17 PROCEDURE — 85610 PROTHROMBIN TIME: CPT

## 2017-07-17 PROCEDURE — 85730 THROMBOPLASTIN TIME PARTIAL: CPT

## 2017-07-17 PROCEDURE — 90935 HEMODIALYSIS ONE EVALUATION: CPT

## 2017-07-17 PROCEDURE — 99223 1ST HOSP IP/OBS HIGH 75: CPT | Performed by: INTERNAL MEDICINE

## 2017-07-17 PROCEDURE — 700111 HCHG RX REV CODE 636 W/ 250 OVERRIDE (IP): Performed by: INTERNAL MEDICINE

## 2017-07-17 PROCEDURE — 96374 THER/PROPH/DIAG INJ IV PUSH: CPT

## 2017-07-17 PROCEDURE — 304561 HCHG STAT O2

## 2017-07-17 PROCEDURE — 82272 OCCULT BLD FECES 1-3 TESTS: CPT

## 2017-07-17 PROCEDURE — 80053 COMPREHEN METABOLIC PANEL: CPT

## 2017-07-17 PROCEDURE — 700102 HCHG RX REV CODE 250 W/ 637 OVERRIDE(OP): Performed by: NURSE PRACTITIONER

## 2017-07-17 PROCEDURE — 700101 HCHG RX REV CODE 250: Performed by: EMERGENCY MEDICINE

## 2017-07-17 PROCEDURE — 74176 CT ABD & PELVIS W/O CONTRAST: CPT

## 2017-07-17 PROCEDURE — 700102 HCHG RX REV CODE 250 W/ 637 OVERRIDE(OP): Performed by: INTERNAL MEDICINE

## 2017-07-17 PROCEDURE — 770020 HCHG ROOM/CARE - TELE (206)

## 2017-07-17 PROCEDURE — 71010 DX-CHEST-PORTABLE (1 VIEW): CPT

## 2017-07-17 PROCEDURE — 85025 COMPLETE CBC W/AUTO DIFF WBC: CPT

## 2017-07-17 PROCEDURE — 5A1D60Z PERFORMANCE OF URINARY FILTRATION, MULTIPLE: ICD-10-PCS | Performed by: INTERNAL MEDICINE

## 2017-07-17 PROCEDURE — 304562 HCHG STAT O2 MASK/CANNULA

## 2017-07-17 PROCEDURE — 99285 EMERGENCY DEPT VISIT HI MDM: CPT

## 2017-07-17 PROCEDURE — A9270 NON-COVERED ITEM OR SERVICE: HCPCS | Performed by: INTERNAL MEDICINE

## 2017-07-17 PROCEDURE — 93005 ELECTROCARDIOGRAM TRACING: CPT | Performed by: EMERGENCY MEDICINE

## 2017-07-17 PROCEDURE — A9270 NON-COVERED ITEM OR SERVICE: HCPCS | Performed by: NURSE PRACTITIONER

## 2017-07-17 RX ORDER — OMEPRAZOLE 20 MG/1
20 CAPSULE, DELAYED RELEASE ORAL
Status: DISCONTINUED | OUTPATIENT
Start: 2017-07-17 | End: 2017-07-20 | Stop reason: HOSPADM

## 2017-07-17 RX ORDER — DEXTROSE MONOHYDRATE 25 G/50ML
50 INJECTION, SOLUTION INTRAVENOUS ONCE
Status: COMPLETED | OUTPATIENT
Start: 2017-07-17 | End: 2017-07-18

## 2017-07-17 RX ORDER — ACETAMINOPHEN 325 MG/1
650 TABLET ORAL EVERY 6 HOURS PRN
COMMUNITY

## 2017-07-17 RX ORDER — TRAMADOL HYDROCHLORIDE 50 MG/1
50 TABLET ORAL EVERY 6 HOURS PRN
Status: DISCONTINUED | OUTPATIENT
Start: 2017-07-17 | End: 2017-07-20 | Stop reason: HOSPADM

## 2017-07-17 RX ORDER — POLYETHYLENE GLYCOL 3350 17 G/17G
1 POWDER, FOR SOLUTION ORAL
Status: DISCONTINUED | OUTPATIENT
Start: 2017-07-17 | End: 2017-07-20 | Stop reason: HOSPADM

## 2017-07-17 RX ORDER — CINACALCET 90 MG/1
1 TABLET, FILM COATED ORAL DAILY
COMMUNITY

## 2017-07-17 RX ORDER — HEPARIN SODIUM 1000 [USP'U]/ML
2000 INJECTION, SOLUTION INTRAVENOUS; SUBCUTANEOUS
Status: DISCONTINUED | OUTPATIENT
Start: 2017-07-17 | End: 2017-07-20 | Stop reason: HOSPADM

## 2017-07-17 RX ORDER — LIDOCAINE HYDROCHLORIDE 10 MG/ML
1 INJECTION, SOLUTION INFILTRATION; PERINEURAL PRN
Status: DISCONTINUED | OUTPATIENT
Start: 2017-07-17 | End: 2017-07-20 | Stop reason: HOSPADM

## 2017-07-17 RX ORDER — BISACODYL 10 MG
10 SUPPOSITORY, RECTAL RECTAL
Status: DISCONTINUED | OUTPATIENT
Start: 2017-07-17 | End: 2017-07-20 | Stop reason: HOSPADM

## 2017-07-17 RX ORDER — SIMVASTATIN 40 MG
40 TABLET ORAL NIGHTLY
Status: DISCONTINUED | OUTPATIENT
Start: 2017-07-17 | End: 2017-07-20 | Stop reason: HOSPADM

## 2017-07-17 RX ORDER — LABETALOL HYDROCHLORIDE 5 MG/ML
10 INJECTION, SOLUTION INTRAVENOUS EVERY 4 HOURS PRN
Status: DISCONTINUED | OUTPATIENT
Start: 2017-07-17 | End: 2017-07-20 | Stop reason: HOSPADM

## 2017-07-17 RX ORDER — HEPARIN SODIUM 1000 [USP'U]/ML
INJECTION, SOLUTION INTRAVENOUS; SUBCUTANEOUS
Status: DISCONTINUED
Start: 2017-07-17 | End: 2017-07-18 | Stop reason: ALTCHOICE

## 2017-07-17 RX ORDER — SODIUM CHLORIDE 9 MG/ML
250 INJECTION, SOLUTION INTRAVENOUS
Status: DISCONTINUED | OUTPATIENT
Start: 2017-07-17 | End: 2017-07-20 | Stop reason: HOSPADM

## 2017-07-17 RX ORDER — FERROUS SULFATE 325(65) MG
325 TABLET ORAL
Status: DISCONTINUED | OUTPATIENT
Start: 2017-07-18 | End: 2017-07-20 | Stop reason: HOSPADM

## 2017-07-17 RX ORDER — ALBUMIN (HUMAN) 12.5 G/50ML
12.5 SOLUTION INTRAVENOUS
Status: DISCONTINUED | OUTPATIENT
Start: 2017-07-17 | End: 2017-07-20 | Stop reason: HOSPADM

## 2017-07-17 RX ORDER — ONDANSETRON 4 MG/1
4 TABLET, ORALLY DISINTEGRATING ORAL EVERY 6 HOURS PRN
COMMUNITY

## 2017-07-17 RX ORDER — AMOXICILLIN 250 MG
2 CAPSULE ORAL 2 TIMES DAILY
Status: DISCONTINUED | OUTPATIENT
Start: 2017-07-17 | End: 2017-07-20 | Stop reason: HOSPADM

## 2017-07-17 RX ORDER — HYDRALAZINE HYDROCHLORIDE 10 MG/1
10 TABLET, FILM COATED ORAL EVERY 8 HOURS
Status: DISCONTINUED | OUTPATIENT
Start: 2017-07-17 | End: 2017-07-20 | Stop reason: HOSPADM

## 2017-07-17 RX ORDER — ONDANSETRON 4 MG/1
4 TABLET, ORALLY DISINTEGRATING ORAL EVERY 4 HOURS PRN
Status: DISCONTINUED | OUTPATIENT
Start: 2017-07-17 | End: 2017-07-20 | Stop reason: HOSPADM

## 2017-07-17 RX ORDER — BISACODYL 10 MG
10 SUPPOSITORY, RECTAL RECTAL DAILY
Status: DISCONTINUED | OUTPATIENT
Start: 2017-07-18 | End: 2017-07-20 | Stop reason: HOSPADM

## 2017-07-17 RX ORDER — LOSARTAN POTASSIUM 25 MG/1
25 TABLET ORAL DAILY
Status: DISCONTINUED | OUTPATIENT
Start: 2017-07-18 | End: 2017-07-20 | Stop reason: HOSPADM

## 2017-07-17 RX ORDER — HEPARIN SODIUM 5000 [USP'U]/ML
5000 INJECTION, SOLUTION INTRAVENOUS; SUBCUTANEOUS EVERY 8 HOURS
Status: DISCONTINUED | OUTPATIENT
Start: 2017-07-17 | End: 2017-07-18

## 2017-07-17 RX ORDER — SEVELAMER HYDROCHLORIDE 400 MG/1
800 TABLET, FILM COATED ORAL 2 TIMES DAILY
Status: DISCONTINUED | OUTPATIENT
Start: 2017-07-17 | End: 2017-07-20 | Stop reason: HOSPADM

## 2017-07-17 RX ORDER — ONDANSETRON 2 MG/ML
4 INJECTION INTRAMUSCULAR; INTRAVENOUS EVERY 4 HOURS PRN
Status: DISCONTINUED | OUTPATIENT
Start: 2017-07-17 | End: 2017-07-20 | Stop reason: HOSPADM

## 2017-07-17 RX ORDER — ISOSORBIDE DINITRATE 10 MG/1
10 TABLET ORAL 3 TIMES DAILY
Status: DISCONTINUED | OUTPATIENT
Start: 2017-07-17 | End: 2017-07-20 | Stop reason: HOSPADM

## 2017-07-17 RX ORDER — METOPROLOL SUCCINATE 25 MG/1
25 TABLET, EXTENDED RELEASE ORAL DAILY
Status: DISCONTINUED | OUTPATIENT
Start: 2017-07-18 | End: 2017-07-20 | Stop reason: HOSPADM

## 2017-07-17 RX ORDER — FERROUS SULFATE 325(65) MG
325 TABLET ORAL 2 TIMES DAILY
COMMUNITY

## 2017-07-17 RX ORDER — ENEMA 19; 7 G/133ML; G/133ML
1 ENEMA RECTAL ONCE
Status: DISCONTINUED | OUTPATIENT
Start: 2017-07-17 | End: 2017-07-17

## 2017-07-17 RX ADMIN — HEPARIN SODIUM 5000 UNITS: 5000 INJECTION, SOLUTION INTRAVENOUS; SUBCUTANEOUS at 22:10

## 2017-07-17 RX ADMIN — OMEPRAZOLE 20 MG: 20 CAPSULE, DELAYED RELEASE ORAL at 22:09

## 2017-07-17 RX ADMIN — TRAMADOL HYDROCHLORIDE 50 MG: 50 TABLET, COATED ORAL at 22:08

## 2017-07-17 RX ADMIN — ISOSORBIDE DINITRATE 10 MG: 10 TABLET ORAL at 22:08

## 2017-07-17 RX ADMIN — HYDRALAZINE HYDROCHLORIDE 10 MG: 10 TABLET, FILM COATED ORAL at 22:09

## 2017-07-17 RX ADMIN — SIMVASTATIN 40 MG: 40 TABLET, FILM COATED ORAL at 22:09

## 2017-07-17 RX ADMIN — DEXTROSE MONOHYDRATE 25 ML: 25 INJECTION, SOLUTION INTRAVENOUS at 15:01

## 2017-07-17 RX ADMIN — STANDARDIZED SENNA CONCENTRATE AND DOCUSATE SODIUM 2 TABLET: 8.6; 5 TABLET, FILM COATED ORAL at 22:09

## 2017-07-17 RX ADMIN — RENAGEL 800 MG: 400 TABLET ORAL at 22:45

## 2017-07-17 ASSESSMENT — LIFESTYLE VARIABLES
EVER_SMOKED: NEVER
ALCOHOL_USE: NO

## 2017-07-17 ASSESSMENT — PATIENT HEALTH QUESTIONNAIRE - PHQ9
SUM OF ALL RESPONSES TO PHQ QUESTIONS 1-9: 0
1. LITTLE INTEREST OR PLEASURE IN DOING THINGS: NOT AT ALL
2. FEELING DOWN, DEPRESSED, IRRITABLE, OR HOPELESS: NOT AT ALL
SUM OF ALL RESPONSES TO PHQ9 QUESTIONS 1 AND 2: 0

## 2017-07-17 ASSESSMENT — PAIN SCALES - GENERAL: PAINLEVEL_OUTOF10: 5

## 2017-07-17 ASSESSMENT — ENCOUNTER SYMPTOMS: WEAKNESS: 1

## 2017-07-17 NOTE — ED NOTES
Lab called with critical result of Ca+ 13.0. Critical lab result read back to lab.   Dr. Stewart notified of critical lab result.

## 2017-07-17 NOTE — ED NOTES
Chief Complaint   Patient presents with   • Weakness     Pt sent from [facility] for generalized weakness and felt that pt was took weak to recieve dialysis.  Pt is poor historian but per report pt has been getting increasingly weak over the past month or so.  Pt has kyphosis of her back and is slumped forward upon arrival.     • Rectal Pain     Pt reports rectal pain.  Pt is unsure of LBM.    Pt bib [facility] for above chief complaint.  Pt receives dialysis MWF, did not get dialyzed today.  Last dialysis on Friday.  Pt AA&Ox4 but is a poor historian.

## 2017-07-17 NOTE — IP AVS SNAPSHOT
" Home Care Instructions                                                                                                                  Name:Skye Oneal [place]  Medical Record Number:[id number]  CSN: [id number]    YOB: 1935   Age: 82 y.o.  Sex: female  HT:1.575 m (5' 2\") WT: 55 kg (121 lb 4.1 oz)          Admit Date: 7/17/2017     Discharge Date:   Today's Date: 7/20/2017  Attending Doctor:  Ryan Soriano M.D.                  Allergies:  Nkda            Discharge Instructions       Discharge Instructions    Discharged to other by medical transportation with escort. Discharged via ambulance, hospital escort: Yes.  Special equipment needed: Oxygen    Be sure to schedule a follow-up appointment with your primary care doctor or any specialists as instructed.     Discharge Plan:   Diet Plan: Discussed  Activity Level: Discussed  Confirmed Follow up Appointment: Appointment Scheduled  Confirmed Symptoms Management: Discussed  Medication Reconciliation Updated: Yes  Influenza Vaccine Indication: Patient Refuses    I understand that a diet low in cholesterol, fat, and sodium is recommended for good health. Unless I have been given specific instructions below for another diet, I accept this instruction as my diet prescription.   Other diet: renal diet    Special Instructions:   HF Patient Discharge Instructions  · Monitor your weight daily, and maintain a weight chart, to track your weight changes.   · Activity as tolerated, unless your Doctor has ordered otherwise. Other activity order: activity as tolerated.  · Follow a low fat, low cholesterol, low salt diet unless instructed otherwise by your Doctor. Read the labels on the back of food products and track your intake of fat, cholesterol and salt.   · Fluid Restriction No. If a Fluid Restriction has been ordered by your Doctor, measure fluids with a measuring cup to ensure that you are not exceeding the restriction.   · No smoking.  · Oxygen Yes. If your Doctor " has ordered that you wear Oxygen at home, it is important to wear it as ordered.  · Did you receive an explanation from staff on the importance of taking each of your medications and why it is necessary to keep taking them unless your doctor says to stop? Yes  · Were all of your questions answered about how to manage your heart failure and what to do if you have increased signs and symptoms after you go home? Yes  · Do you feel like your heart failure care team involved you in the care treatment plan and allowed you to make decisions regarding your care while in the hospital and addressed any discharge needs you might have? Yes    See the educational handout provided at discharge for more information on monitoring your daily weight, activity and diet. This also explains more about Heart Failure, symptoms of a flare-up and some of the tests that you have undergone.     Warning Signs of a Flare-Up include:  · Swelling in the ankles or lower legs.  · Shortness of breath, while at rest, or while doing normal activities.   · Shortness of breath at night when in bed, or coughing in bed.   · Requiring more pillows to sleep at night, or needing to sit up at night to sleep.  · Feeling weak, dizzy or fatigued.     When to call your Doctor:  · Call [facility] seven days a week from 8:00 a.m. to 8:00 p.m. for medical questions [phone].  · Call your Primary Care Physician or Cardiologist if:   · You experience any pain radiating to your jaw or neck.  · You have any difficulty breathing.  · You experience weight gain of 3 lbs in a day or 5 lbs in a week.   · You feel any palpitations or irregular heartbeats.  · You become dizzy or lose consciousness.   If you have had an angiogram or had a pacemaker or AICD placed, and experience:  · Bleeding, drainage or swelling at the surgical / puncture site.  · Fever greater than 100.0 F  · Shock from internal defibrillator.  · Cool and / or numb extremities.      · Is  patient discharged on Warfarin / Coumadin?   No     · Is patient Post Blood Transfusion?  No    Hyperkalemia  Hyperkalemia is when you have too much potassium in your blood. Potassium is normally removed (excreted) from your body by your kidneys. If there is too much potassium in your blood, it can affect your heart's ability to function.   CAUSES   Hyperkalemia may be caused by:   · Taking in too much potassium. You can do this by:  ¨ Using salt substitutes. They contain large amounts of potassium.  ¨ Taking potassium supplements.  ¨ Eating foods high in potassium.  · Excreting too little potassium. This can happen if:  ¨ Your kidneys are not working properly. Kidney (renal) disease, including short- or long-term renal failure, is a very common cause of hyperkalemia.  ¨ You are taking medicines that lower your excretion of potassium.  ¨ You have [place] disease.  ¨ You have a urinary tract blockage, such as kidney stones.  ¨ You are on treatment to mechanically clean your blood (dialysis) and you skip a treatment.  · Releasing a high amount of potassium from your cells into your blood. This can happen with:  ¨ Injury to muscles (rhabdomyolysis) or other tissues. Most potassium is stored in your muscles.  ¨ Severe burns or infections.  ¨ Acidic blood plasma (acidosis). Acidosis can result from many diseases, such as uncontrolled diabetes.  RISK FACTORS  The most common risk factor of hyperkalemia is kidney disease. Other risk factors of hyperkalemia include:  · [place] disease. This is a condition where your glands do not produce enough hormones.  · Alcoholism or heavy drug use.    · Using certain blood pressure medicines, such as angiotensin-converting enzyme (ACE) inhibitors, angiotensin II receptor blockers (ARBs), or potassium-sparing diuretics such as spironolactone.  · Severe injury or burn.  SIGNS AND SYMPTOMS   Oftentimes, there are no signs or symptoms of hyperkalemia.  However, when your potassium level  becomes high enough, you may experience symptoms such as:  · Irregular or very slow heartbeat.  · Nausea.  · Fatigue.  · Tingling of the skin or numbness of the hands or feet.  · Muscle weakness.  · Fatigue.  · Not being able to move (paralysis).  You may not have any symptoms of hyperkalemia.   DIAGNOSIS   Hyperkalemia may be diagnosed by:  · Physical exam.  · Blood tests.  · ECG (electrocardiogram).  · Discussion of prescription and non-prescription drug use.  TREATMENT   Treatment for hyperkalemia is often directed at the underlying cause. In some instances, treatment may include:   · Insulin.  · Glucose (sugar) and water solution given through a vein (intravenous or IV ).  · Dialysis.  · Medicines to remove the potassium from your body.  · Medicines to move calcium from your bloodstream into your tissues.  HOME CARE INSTRUCTIONS   · Take medicines only as directed by your health care provider.  · Do not take any supplements, natural products, herbs, or vitamins without reviewing them with your health care provider. Certain supplements and natural food products can have high amounts of potassium.  · Limit your alcohol intake as directed by your health care provider.  · Stop illegal drug use. If you need help quitting, ask your health care provider.  · Keep all follow-up visits as directed by your health care provider. This is important.  · If you have kidney disease, you may need to follow a low potassium diet. A dietitian can help educate you on low potassium foods.  SEEK MEDICAL CARE IF:   · You notice an irregular or very slow heartbeat.  · You feel light-headed.  · You feel weak.  · You are nauseous.  · You have tingling or numbness in your hands or feet.  SEEK IMMEDIATE MEDICAL CARE IF:   · You have shortness of breath.  · You have chest pain or discomfort.  · You pass out.  · You have muscle paralysis.  MAKE SURE YOU:   · Understand these instructions.  · Will watch your condition.  · Will get help right  away if you are not doing well or get worse.     This information is not intended to replace advice given to you by your health care provider. Make sure you discuss any questions you have with your health care provider.     Document Released: 12/08/2003 Document Revised: 01/08/2016 Document Reviewed: 03/25/2015  [employer] Interactive Patient Education ©2016 [employer]      Depression / Suicide Risk    As you are discharged from this [facility] facility, it is important to learn how to keep safe from harming yourself.    Recognize the warning signs:  · Abrupt changes in personality, positive or negative- including increase in energy   · Giving away possessions  · Change in eating patterns- significant weight changes-  positive or negative  · Change in sleeping patterns- unable to sleep or sleeping all the time   · Unwillingness or inability to communicate  · Depression  · Unusual sadness, discouragement and loneliness  · Talk of wanting to die  · Neglect of personal appearance   · Rebelliousness- reckless behavior  · Withdrawal from people/activities they love  · Confusion- inability to concentrate     If you or a loved one observes any of these behaviors or has concerns about self-harm, here's what you can do:  · Talk about it- your feelings and reasons for harming yourself  · Remove any means that you might use to hurt yourself (examples: pills, rope, extension cords, firearm)  · Get professional help from the community (Mental Health, Substance Abuse, psychological counseling)  · Do not be alone:Call your Safe Contact- someone whom you trust who will be there for you.  · Call your local CRISIS HOTLINE 335-2668 or 983-982-6932  · Call your local Children's Mobile Crisis Response Team [place] (377) 300-5161 or www.Reds10  · Call the toll free National Suicide Prevention Hotlines   · National Suicide Prevention Lifeline 929-349-SFWG (5877)  · National Hope Line Network 800-SUICIDE  (800-5111)          Your appointments     Jul 21, 2017  8:15 AM   Adult Draw/Collection with LAB SKILLED NURSING   LAB - SKILLED NURSING (--)    [address] [address]   [phone]            Aug 04, 2017  1:00 PM   Established Patient with Alistair Gandara M.D.   [facility] (--)    [address]  [address]  [place] [id number] [phone]           You will be receiving a confirmation call a few days before your appointment from our automated call confirmation system.                 Discharge Medication Instructions:    Below are the medications your physician expects you to take upon discharge:    Review all your home medications and newly ordered medications with your doctor and/or pharmacist. Follow medication instructions as directed by your doctor and/or pharmacist.    Please keep your medication list with you and share with your physician.               Medication List      START taking these medications        Instructions    Morning Afternoon Evening Bedtime    Aspirin 325 MG Tbec   Last time this was given:  325 mg on 7/20/2017  8:58 AM   Next Dose Due:  Tomorrow         Take 1 Tab by mouth every day.   Dose:  325 mg                        bisacodyl 10 MG Supp   Last time this was given:  10 mg on 7/20/2017  8:58 AM   Commonly known as:  DULCOLAX   Next Dose Due:  Tomorrow         Insert 1 Suppository in rectum every day.   Dose:  10 mg                          CONTINUE taking these medications        Instructions    Morning Afternoon Evening Bedtime    acetaminophen 325 MG Tabs   Commonly known as:  TYLENOL   Next Dose Due:  As needed         Take 650 mg by mouth every 6 hours as needed.   Dose:  650 mg                        CULTURELLE PO   Next Dose Due:  Tonight         Take 1 Tab by mouth 3 times a day. Chewable 1 million cell   Dose:  1 Tab                        hydrALAZINE 10 MG Tabs   Last time this was given:  10 mg on 7/20/2017  2:08 PM   Commonly known as:   APRESOLINE   Next Dose Due:  Tonight         Take 1 Tab by mouth every 8 hours.   Dose:  10 mg                        IRON SUPPLEMENT 325 (65 FE) MG tablet   Last time this was given:  325 mg on 7/20/2017  8:58 AM   Generic drug:  ferrous sulfate   Next Dose Due:  Tonight         Take 325 mg by mouth 2 Times a Day.   Dose:  325 mg                        isosorbide dinitrate 10 MG Tabs   Last time this was given:  10 mg on 7/20/2017  2:08 PM   Commonly known as:  ISORDIL   Next Dose Due:  Tonight         Take 1 Tab by mouth 3 times a day.   Dose:  10 mg                        losartan 25 MG Tabs   Last time this was given:  25 mg on 7/20/2017  8:58 AM   Commonly known as:  COZAAR   Next Dose Due:  Tomorrow         Take 25 mg by mouth every day.   Dose:  25 mg                        metoprolol SR 25 MG Tb24   Last time this was given:  25 mg on 7/20/2017  8:58 AM   Commonly known as:  TOPROL XL        Take 25 mg by mouth every day.   Dose:  25 mg                        omeprazole 20 MG delayed-release capsule   Last time this was given:  20 mg on 7/20/2017  5:21 AM   Commonly known as:  PRILOSEC   Next Dose Due:  Tonight         Take 1 Cap by mouth 2 times daily, before breakfast and dinner.   Dose:  20 mg                        ondansetron 4 MG Tbdp   Commonly known as:  ZOFRAN ODT   Next Dose Due:  As needed           Take 4 mg by mouth every 6 hours as needed for Nausea/Vomiting.   Dose:  4 mg                        SENSIPAR 90 MG Tabs   Generic drug:  Cinacalcet HCl   Next Dose Due:  Tomorrow         Take 1 Tab by mouth every day.   Dose:  1 Tab                        Sevelamer Carbonate 800 MG Tabs        Take 1 Tab by mouth 2 Times a Day.   Dose:  1 Tab                        simvastatin 40 MG Tabs   Last time this was given:  40 mg on 7/19/2017  8:55 PM   Commonly known as:  ZOCOR   Next Dose Due:  Tonight         Take 40 mg by mouth every evening.   Dose:  40 mg                             Where to Get Your  Medications      These medications were sent to [facility] - [place], [place] - [address]  [address] [address]     Phone:  [phone]    - Aspirin 325 MG Tbec  - bisacodyl 10 MG Supp            Orders for after discharge     REFERRAL TO SKILLED NURSING FACILITY    Complete by:  As directed              Instructions           Diet / Nutrition:    Follow any diet instructions given to you by your doctor or the dietician, including how much salt (sodium) you are allowed each day.    If you are overweight, talk to your doctor about a weight reduction plan.    Activity:    Remain physically active following your doctor's instructions about exercise and activity.    Rest often.     Any time you become even a little tired or short of breath, SIT DOWN and rest.    Worsening Symptoms:    Report any of the following signs and symptoms to the doctor's office immediately:    *Pain of jaw, arm, or neck  *Chest pain not relieved by medication                               *Dizziness or loss of consciousness  *Difficulty breathing even when at rest   *More tired than usual                                       *Bleeding drainage or swelling of surgical site  *Swelling of feet, ankles, legs or stomach                 *Fever (>100ºF)  *Pink or blood tinged sputum  *Weight gain (3lbs/day or 5lbs /week)           *Shock from internal defibrillator (if applicable)  *Palpitations or irregular heartbeats                *Cool and/or numb extremities    Stroke Awareness    Common Risk Factors for Stroke include:    Age  Atrial Fibrillation  Carotid Artery Stenosis  Diabetes Mellitus  Excessive alcohol consumption  High blood pressure  Overweight   Physical inactivity  Smoking    Warning signs and symptoms of a stroke include:    *Sudden numbness or weakness of the face, arm or leg (especially on one side of the body).  *Sudden confusion, trouble speaking or understanding.  *Sudden trouble seeing in one or both  eyes.  *Sudden trouble walking, dizziness, loss of balance or coordination.Sudden severe headache with no known cause.    It is very important to get treatment quickly when a stroke occurs. If you experience any of the above warning signs, call 911 immediately.                   Disclaimer         Quit Smoking / Tobacco Use:    I understand the use of any tobacco products increases my chance of suffering from future heart disease or stroke and could cause other illnesses which may shorten my life. Quitting the use of tobacco products is the single most important thing I can do to improve my health. For further information on smoking / tobacco cessation call a Toll Free Quit Line at 1-174.548.2218 (*National Cancer [place]) or 1-469.199.3001 (American Lung Association) or you can access the web based program at www.lungMeaningo.org.    [place] Tobacco Users Help Line:  (927) 126-5450       Toll Free: 1-677.266.8753  Quit Tobacco Program Pending sale to [facility] [phone]    Crisis Hotline:    [place] Crisis Hotline:  1-667-JSIMVPL or 1-418.124.5005    [place] Crisis Hotline:    1-258.529.7127 or 294-724-1276    Discharge Survey:   Thank you for choosing Pending sale to [facility]. We hope we did everything we could to make your hospital stay a pleasant one. You may be receiving a phone survey and we would appreciate your time and participation in answering the questions. Your input is very valuable to us in our efforts to improve our service to our patients and their families.        My signature on this form indicates that:    1. I have reviewed and understand the above information.  2. My questions regarding this information have been answered to my satisfaction.  3. I have formulated a plan with my discharge nurse to obtain my prescribed medications for home.                  Disclaimer         __________________________________                     __________       ________                       Patient Signature                                                  Date                    Time

## 2017-07-17 NOTE — IP AVS SNAPSHOT
" <p align=\"LEFT\"><IMG SRC=\"//[email or web address]\" alt=\"Image\" WIDTH=\"50%\" HEIGHT=\"200\" BORDER=\"\"></p>                   Name:Skye Paintingchinson  Medical Record Number:[id number]  CSN: [id number]    YOB: 1935   Age: 82 y.o.  Sex: female  HT:1.575 m (5' 2\") WT: 55 kg (121 lb 4.1 oz)          Admit Date: 7/17/2017     Discharge Date:   Today's Date: 7/20/2017  Attending Doctor:  Ryan Soriano M.D.                  Allergies:  Nkda          Your appointments     Jul 21, 2017  8:15 AM   Adult Draw/Collection with LAB SKILLED NURSING   LAB - SKILLED NURSING (--)    [address] [place] [address]   [phone]            Aug 04, 2017  1:00 PM   Established Patient with Alistair Gandara M.D.   [facility] (--)    [address]  [address]  [place] [address] [phone]           You will be receiving a confirmation call a few days before your appointment from our automated call confirmation system.                 Medication List      Take these Medications        Instructions    acetaminophen 325 MG Tabs   Commonly known as:  TYLENOL    Take 650 mg by mouth every 6 hours as needed.   Dose:  650 mg       Aspirin 325 MG Tbec    Take 1 Tab by mouth every day.   Dose:  325 mg       bisacodyl 10 MG Supp   Commonly known as:  DULCOLAX    Insert 1 Suppository in rectum every day.   Dose:  10 mg       CULTURELLE PO    Take 1 Tab by mouth 3 times a day. Chewable 1 million cell   Dose:  1 Tab       hydrALAZINE 10 MG Tabs   Commonly known as:  APRESOLINE    Take 1 Tab by mouth every 8 hours.   Dose:  10 mg       IRON SUPPLEMENT 325 (65 FE) MG tablet   Generic drug:  ferrous sulfate    Take 325 mg by mouth 2 Times a Day.   Dose:  325 mg       isosorbide dinitrate 10 MG Tabs   Commonly known as:  ISORDIL    Take 1 Tab by mouth 3 times a day.   Dose:  10 mg       losartan 25 MG Tabs   Commonly known as:  COZAAR    Take 25 mg by mouth every day.   Dose:  25 mg       metoprolol SR 25 MG Tb24   "   Commonly known as:  TOPROL XL    Take 25 mg by mouth every day.   Dose:  25 mg       omeprazole 20 MG delayed-release capsule   Commonly known as:  PRILOSEC    Take 1 Cap by mouth 2 times daily, before breakfast and dinner.   Dose:  20 mg       ondansetron 4 MG Tbdp   Commonly known as:  ZOFRAN ODT    Take 4 mg by mouth every 6 hours as needed for Nausea/Vomiting.   Dose:  4 mg       SENSIPAR 90 MG Tabs   Generic drug:  Cinacalcet HCl    Take 1 Tab by mouth every day.   Dose:  1 Tab       Sevelamer Carbonate 800 MG Tabs    Take 1 Tab by mouth 2 Times a Day.   Dose:  1 Tab       simvastatin 40 MG Tabs   Commonly known as:  ZOCOR    Take 40 mg by mouth every evening.   Dose:  40 mg

## 2017-07-17 NOTE — ASSESSMENT & PLAN NOTE
Coumadin stopped due to GI bleed, discussed with family, she is likely no longer a good coumadin candidate. Discussed by the hospitalist and intensivists prior to my care.  Follow up attending physician at [facility] facility upon receipt. Defer referral to Pulmonology.   Follow up with Alistair Gandara M.D. In 1-2 weeks

## 2017-07-17 NOTE — IP AVS SNAPSHOT
Wardrobe Housekeeper Access Code: [email or web address]  Expires: 8/12/2017  4:10 AM    Wardrobe Housekeeper  A secure, online tool to manage your health information     [employer]’s Wardrobe Housekeeper® is a secure, online tool that connects you to your personalized health information from the privacy of your home -- day or night - making it very easy for you to manage your healthcare. Once the activation process is completed, you can even access your medical information using the Wardrobe Housekeeper katheryn, which is available for free in the Apple Katheryn store or Google Play store.     Wardrobe Housekeeper provides the following levels of access (as shown below):   My Chart Features   [facility] Primary Care Doctor [facility]  Specialists [facility]  Urgent  Care Non-[facility]  Primary Care  Doctor   Email your healthcare team securely and privately 24/7 X X X X   Manage appointments: schedule your next appointment; view details of past/upcoming appointments X      Request prescription refills. X      View recent personal medical records, including lab and immunizations X X X X   View health record, including health history, allergies, medications X X X X   Read reports about your outpatient visits, procedures, consult and ER notes X X X X   See your discharge summary, which is a recap of your hospital and/or ER visit that includes your diagnosis, lab results, and care plan. X X       How to register for Wardrobe Housekeeper:  1. Go to  [email or web address].  2. Click on the Sign Up Now box, which takes you to the New Member Sign Up page. You will need to provide the following information:  a. Enter your Wardrobe Housekeeper Access Code exactly as it appears at the top of this page. (You will not need to use this code after you’ve completed the sign-up process. If you do not sign up before the expiration date, you must request a new code.)   b. Enter your date of birth.   c. Enter your home email address.   d. Click Submit, and follow the next screen’s instructions.  3. Create a Wardrobe Housekeeper ID. This will be your Wardrobe Housekeeper  login ID and cannot be changed, so think of one that is secure and easy to remember.  4. Create a [employer] password. You can change your password at any time.  5. Enter your Password Reset Question and Answer. This can be used at a later time if you forget your password.   6. Enter your e-mail address. This allows you to receive e-mail notifications when new information is available in [employer].  7. Click Sign Up. You can now view your health information.    For assistance activating your [employer] account, call [phone]

## 2017-07-17 NOTE — IP AVS SNAPSHOT
7/20/2017    Skye James  [address]  [place] [address]    Dear Skye:    [facility] wants to ensure your discharge home is safe and you or your loved ones have had all of your questions answered regarding your care after you leave the hospital.    Below is a list of resources and contact information should you have any questions regarding your hospital stay, follow-up instructions, or active medical symptoms.    Questions or Concerns Regarding… Contact   Medical Questions Related to Your Discharge  (7 days a week, 8am-5pm) Contact a Nurse Care Coordinator   [phone]   Medical Questions Not Related to Your Discharge  (24 hours a day / 7 days a week)  Contact the Nurse Health Line   [phone]    Medications or Discharge Instructions Refer to your discharge packet   or contact your [facility] Primary Care Provider   [phone]   Follow-up Appointment(s) Schedule your appointment via [employer]   or contact Scheduling [phone]   Billing Review your statement via [employer]  or contact Billing [phone]   Medical Records Review your records via [employer]   or contact Medical Records [phone]     You may receive a telephone call within two days of discharge. This call is to make certain you understand your discharge instructions and have the opportunity to have any questions answered. You can also easily access your medical information, test results and upcoming appointments via the [employer] free online health management tool. You can learn more and sign up at [facility]/[employer]. For assistance setting up your [employer] account, please call [phone].    Once again, we want to ensure your discharge home is safe and that you have a clear understanding of any next steps in your care. If you have any questions or concerns, please do not hesitate to contact us, we are here for you. Thank you for choosing [facility] for your healthcare needs.    Sincerely,    Your [facility] Healthcare Team

## 2017-07-17 NOTE — ED PROVIDER NOTES
"ED Provider Note    CHIEF COMPLAINT  Chief Complaint   Patient presents with   • Weakness     Pt sent from [facility] for generalized weakness and felt that pt was took weak to recieve dialysis.  Pt is poor historian but per report pt has been getting increasingly weak over the past month or so.  Pt has kyphosis of her back and is slumped forward upon arrival.     • Rectal Pain     Pt reports rectal pain.  Pt is unsure of LBM.        HPI  Skye James is a 82 y.o. female who presents to the emergency department complaining of weakness constipation and a burning discomfort in the rectal area. The patient went to her dialysis appointment this morning and relayed these complaints to staff members who then sent her to the emergency department. The patient did not have dialysis. She does not recognize any precipitating events or exacerbating or alleviating factors. She is a rather poor historian and primarily complains that she is feeling tired. The patient does have a history of constipation she thinks that her last bowel movement was 2 days ago but she is not exactly sure.    REVIEW OF SYSTEMS no fever no chills no vomiting no chest pain or difficulty breathing. All other systems negative    PAST MEDICAL HISTORY  Past Medical History   Diagnosis Date   • GOUT    • other      \"thick blood\"   • Hypertension    • CKD (chronic kidney disease)    • Renal disorder    • Arrhythmia    • Heart murmur    • MEDICAL HOME 4/23/2013   • Arthritis      left knee   • DVT (deep venous thrombosis) (CMS-[facility])      history of DVT 3 times   • CATARACT      gabi surgery  completed   • Preoperative clearance 7/15/2013   • Unspecified hemorrhagic conditions      on coumadin   • Dialysis      M, W, F in [place]   • Pneumonia 5/24/2014   • Other specified disorder of intestines      constipation   • ASTHMA      no meds necessary since moving to [place] from [place]   • Asthma      inhalers as needed   • Anemia    • Dental disorder      " dentures   • Falls    • Anginal syndrome (CMS-HCC)    • Glaucoma    • Fall    • Indigestion    • Disorder of thyroid    • Chronic anticoagulation    • Moderate mitral regurgitation    • PAH (pulmonary artery hypertension) - liekly due to mitral regurgitation but with history of thromboembolic disease        FAMILY HISTORY  Family History   Problem Relation Age of Onset   • Hypertension Mother        SOCIAL HISTORY  Social History     Social History   • Marital Status:      Spouse Name: N/A   • Number of Children: N/A   • Years of Education: N/A     Social History Main Topics   • Smoking status: Never Smoker    • Smokeless tobacco: Never Used   • Alcohol Use: No   • Drug Use: No   • Sexual Activity: No     Other Topics Concern   • Not on file     Social History Narrative       SURGICAL HISTORY  Past Surgical History   Procedure Laterality Date   • Tonsillectomy     • Hysterectomy laparoscopy       L side   • Av fistula creation  6/22/2010     Performed by MICHA KIRBY at SURGERY [facility] ORS   • Recovery  4/3/2012     Performed by SURGERY, IR-RECOVERY at SURGERY SAME DAY [facility] ORS   • Cataract phaco with iol  6/20/2012     Performed by LEO RENDON at SURGERY [facility] ORS   • Recovery  11/27/2012     Performed by Ir-Recovery Surgery at SURGERY SAME DAY [facility] ORS   • Recovery  7/16/2013     Performed by Ir-Recovery Surgery at SURGERY SAME DAY [facility] ORS   • Recovery  8/5/2014     Performed by Ir-Recovery Surgery at SURGERY SAME DAY [facility] ORS   • Colonoscopy - endo N/A 6/22/2017     Procedure: COLONOSCOPY - ENDO;  Surgeon: Jeff Comer M.D.;  Location: ENDOSCOPY [facility];  Service:        CURRENT MEDICATIONS  Home Medications     **Home medications have not yet been reviewed for this encounter**          ALLERGIES  Allergies   Allergen Reactions   • Nkda [No Known Drug Allergy]        PHYSICAL EXAM  VITAL SIGNS: /61 mmHg  Pulse 66  Temp(Src) 36.5 °C (97.7 °F)  Resp 13  " Ht 1.575 m (5' 2\")  Wt 57.607 kg (127 lb)  BMI 23.22 kg/m2  SpO2 100%  LMP 05/12/1970   Oxygen saturation is interpreted as adequate  Constitutional: Awake verbal and she does not appear distressed although she does appear chronically ill and is very hard of hearing and a poor historian  HENT: Mucous membranes are dry  Eyes: No erythema or discharge or jaundice  Neck: Trachea midline there is slight JVD  Cardiovascular: Regular rate and rhythm  Lungs: Clear and equal bilaterally with no apparent difficulty breathing  Abdomen/Back: Soft nontender nondistended no peritoneal findings bowel sounds are present. A rectal examination was done with a nurse chaperone and there is a very tiny wound about 1 cm from the anus that looks like it might be causing her discomfort. Stool itself is chalky black color and strongly heme positive on the guaiac card. There was a lot of soft stool in the rectal vault but stool was not hard and clinically this did not seem like a rectal impaction  Skin: Warm and dry  Musculoskeletal: No acute bony deformity  Neurologic: Awake and verbally moving all extremities    Chart review  I reviewed the patient's discharge summary from June 30, 2017 she was admitted with bloody bowel movements and had a negative colonoscopy except for diverticulosis and apparently she was on Coumadin at that time and that note indicates Coumadin has been discontinued please see the note for further details    Laboratory  A CBC shows a white blood count of 6.3 with hemoglobin 10.6, basic metabolic panel is shows a slightly low sodium of 133 and elevated potassium of 6.3 a low chloride of 93 a low glucose of 63 elevated creatinine of 5.71 alk phos slightly elevated 134. INR is 1.05    EKG interpretation  A 12-lead EKG shows sinus rhythm 72 bpm there is baseline wandering there is no pathologic ST elevation or depression when necessary interval is 172 ms QTc interval is 394 ms    Radiology  DX-CHEST-PORTABLE (1 " VIEW)   Final Result      Bibasilar opacities may represent atelectasis or consolidation.      Small bilateral pleural effusions are likely present.      Cardiomegaly.      Atherosclerotic plaque.      Mild interstitial prominence may represent mild edema.      CT-ABDOMEN-PELVIS W/O   Final Result      1.  Some degree of fecal impaction may be present although the more proximal colon does not appear to be significantly dilated.      2.  Cardiomegaly is noted.      3.  Extensive calcific atherosclerosis again noted.      4.  Bilateral pulmonary consolidations are identified. There are also bilateral pleural effusions. Aspiration or pneumonia are possibilities.      5.  Small anterior abdominal wall hernia is noted containing only peritoneal fat at this time. No dilated loops of bowel are identified.        MEDICAL DECISION MAKING and DISPOSITION  In the emergency Department peripheral IV was established and the patient was placed on a cardiac monitor. I ordered intravenous dextrose. I have reviewed the case with Dr. Marquez who will arrange dialysis as soon as possible for this patient. I reviewed the case with the hospitalist and the patient will be admitted for further evaluation and further treatment    IMPRESSION  1. Hyperkalemia  2. Hypercalcemia  3. Hypoglycemia  4. Chronic renal failure requiring dialysis  5. GI bleeding  6. Critical care time with this patient is 35 minutes         Electronically signed by: Felix Stewart, 7/17/2017 2:40 PM

## 2017-07-17 NOTE — ED NOTES
Dialysis RN states they cannot take pt without tele box.  Admitting order placed by MD Liang.  Bed control called and they state they do not have any tele beds.  MD Liang notified that pt will go to dialysis when pt has tele bed.

## 2017-07-17 NOTE — ED NOTES
ERP and female RN at bedside for rectal exam.  Pt also noted to have pressure ulcer to coccyx area.  Pt turned and repositioned by staff.

## 2017-07-17 NOTE — ASSESSMENT & PLAN NOTE
Sevelamer, dialysis, nephrology following  Stable.  Follow up attending physician at [facility] facility upon receipt. Defer referral to Pulmonology.   Follow up with Alistair Gandara M.D. In 1-2 weeks  Follow up Nephrology Dr. Moses in 10-2 weeks for dialysis.

## 2017-07-17 NOTE — ASSESSMENT & PLAN NOTE
Watch volume, RT/O2  Dialysis to help with volume management  Follow up attending physician at [facility] facility upon receipt. Defer referral to Pulmonology.   Follow up with Alistair Gandara M.D. In 1-2 weeks  Follow up with Dr. Moses or nephrologust at Nephrology clinic for dialysis in 1-2 weeks.

## 2017-07-17 NOTE — H&P
" Hospital Medicine History and Physical    Date of Service  7/17/2017    Chief Complaint  Chief Complaint   Patient presents with   • Weakness     Pt sent from [facility] for generalized weakness and felt that pt was took weak to recieve dialysis.  Pt is poor historian but per report pt has been getting increasingly weak over the past month or so.  Pt has kyphosis of her back and is slumped forward upon arrival.     • Rectal Pain     Pt reports rectal pain.  Pt is unsure of LBM.        History of Presenting Illness  82 y.o. female who presented 7/17/2017 with ESRD, HTN, hx DVT previously on coumadin but stopped due to GIB, felt quite weak today at dialysis, slumped forward, sent in from center without dialysis. Normally MWF, noted hyperkalemia, frailty.  Pt is poor historian, grandchildren at bedside give some info, poor oral intake over months, increasing failure to thrive.  I explained that dialysis in this age group can take significant toll on pts energy. No obvious infection found, no fevers, no URI sxs, no SOB, no cough.  Constipated, will give enema after dialysis.    Primary Care Physician  Alistair Gandara M.D.    Consultants  Dr. Marquez    Code Status  FULL    Review of Systems  ROS       Past Medical History  Past Medical History   Diagnosis Date   • GOUT    • other      \"thick blood\"   • Hypertension    • CKD (chronic kidney disease)    • Renal disorder    • Arrhythmia    • Heart murmur    • MEDICAL HOME 4/23/2013   • Arthritis      left knee   • DVT (deep venous thrombosis) (CMS-[facility])      history of DVT 3 times   • CATARACT      gabi surgery  completed   • Preoperative clearance 7/15/2013   • Unspecified hemorrhagic conditions      on coumadin   • Dialysis      M, W, F in [place]   • Pneumonia 5/24/2014   • Other specified disorder of intestines      constipation   • ASTHMA      no meds necessary since moving to [place] from [place]   • Asthma      inhalers as needed   • Anemia    • Dental disorder      " dentures   • Falls    • Anginal syndrome (CMS-HCC)    • Glaucoma    • Fall    • Indigestion    • Disorder of thyroid    • Chronic anticoagulation    • Moderate mitral regurgitation    • PAH (pulmonary artery hypertension) - liekly due to mitral regurgitation but with history of thromboembolic disease        Surgical History  Past Surgical History   Procedure Laterality Date   • Tonsillectomy     • Hysterectomy laparoscopy       L side   • Av fistula creation  6/22/2010     Performed by MICHA KIRBY at SURGERY [facility] ORS   • Recovery  4/3/2012     Performed by SURGERY, IR[facility]RECOVERY [facility]   • Cataract phaco with iol  6/20/2012     Performed by LEO RENDON at SURGERY [facility]S   • Recovery  11/27/2012     Performed by Ir-Recovery Surgery at SURGERY SAME DAY [facility]   • Recovery  7/16/2013     Performed by Ir-Recovery Surgery at SURGERY SAME DAY [facility] ORS   • Recovery  8/5/2014     Performed by Ir-Recovery Surgery at SURGERY [facility]   • Colonoscopy - endo N/A 6/22/2017     Procedure: COLONOSCOPY - ENDO;  Surgeon: Jeff Comer M.D.;  Location: ENDOSCOPY [facility];  Service:        Medications  No current facility-administered medications on file prior to encounter.     Current Outpatient Prescriptions on File Prior to Encounter   Medication Sig Dispense Refill   • Lactobacillus Rhamnosus, GG, (CULTURELLE PO) Take 1 Tab by mouth 3 times a day. Chewable 1 million cell     • omeprazole (PRILOSEC) 20 MG delayed-release capsule Take 1 Cap by mouth 2 times daily, before breakfast and dinner. 30 Cap    • isosorbide dinitrate (ISORDIL) 10 MG Tab Take 1 Tab by mouth 3 times a day. 90 Tab    • hydrALAZINE (APRESOLINE) 10 MG Tab Take 1 Tab by mouth every 8 hours. 90 Tab    • losartan (COZAAR) 25 MG Tab Take 25 mg by mouth every day.     • metoprolol SR (TOPROL XL) 25 MG TABLET SR 24 HR Take 25 mg by mouth every day.     • Sevelamer Carbonate 800 MG Tab Take  1 Tab by mouth 2 Times a Day.     • simvastatin (ZOCOR) 40 MG Tab Take 40 mg by mouth every evening.         Family History  Family History   Problem Relation Age of Onset   • Hypertension Mother        Social History  Social History   Substance Use Topics   • Smoking status: Never Smoker    • Smokeless tobacco: Never Used   • Alcohol Use: No       Allergies  Allergies   Allergen Reactions   • Nkda [No Known Drug Allergy]         Physical Exam  Laboratory   Hemodynamics  Temp (24hrs), Av.5 °C (97.7 °F), Min:36.5 °C (97.7 °F), Max:36.5 °C (97.7 °F)   Temperature: 36.5 °C (97.7 °F)  Pulse  Av.5  Min: 65  Max: 75 Heart Rate (Monitored): 65  Blood Pressure : (!) 171/61 mmHg, NIBP: 152/55 mmHg      Respiratory      Respiration: 17, Pulse Oximetry: 100 %             Physical Exam    Recent Labs      17   1250   WBC  6.3   RBC  3.36*   HEMOGLOBIN  10.6*   HEMATOCRIT  34.9*   MCV  103.9*   MCH  31.5   MCHC  30.4*   RDW  62.3*   PLATELETCT  152*   MPV  10.8     Recent Labs      17   1250   SODIUM  133*   POTASSIUM  6.3*   CHLORIDE  93*   CO2  33   GLUCOSE  63*   BUN  36*   CREATININE  5.71*   CALCIUM  13.0*     Recent Labs      17   1250   ALTSGPT  11   ASTSGOT  21   ALKPHOSPHAT  134*   TBILIRUBIN  0.7   GLUCOSE  63*     Recent Labs      17   1250   APTT  27.5   INR  1.05             Lab Results   Component Value Date    TROPONINI 0.05* 2017       Imaging  CXR    Bibasilar opacities may represent atelectasis or consolidation.    Small bilateral pleural effusions are likely present.    Cardiomegaly.    Atherosclerotic plaque.    Mild interstitial prominence may represent mild edema.    CT abd/pelvis    1.  Some degree of fecal impaction may be present although the more proximal colon does not appear to be significantly dilated.    2.  Cardiomegaly is noted.    3.  Extensive calcific atherosclerosis again noted.    4.  Bilateral pulmonary consolidations are identified. There are also  bilateral pleural effusions. Aspiration or pneumonia are possibilities.    5.  Small anterior abdominal wall hernia is noted containing only peritoneal fat at this time. No dilated loops of bowel are identified.   Assessment/Plan     I anticipate this patient will require at least two midnights for appropriate medical management, necessitating inpatient admission.    ESRD (end stage renal disease) on dialysis (CMS-HCC) (present on admission)  Assessment & Plan  Sevelamer, dialysis today    History of DVT of lower extremity (present on admission)  Assessment & Plan  Coumadin stopped due to GI bleed, discussed with family, she is likely no longer a good coumadin candidate    PAH (pulmonary artery hypertension) - liekly due to mitral regurgitation but with history of thromboembolic disease (present on admission)  Assessment & Plan  Watch volume, RT/O2    Dementia (present on admission)  Assessment & Plan  Family support    HTN (hypertension) (present on admission)  Assessment & Plan  Coreg, losartan, hydralazine, imdur    GIB (gastrointestinal bleeding) (present on admission)  Assessment & Plan  PPI, off AC    Hyperkalemia  Assessment & Plan  Dialysis today, discussed with Dr. Alma LANDEROS (constipation)  Assessment & Plan  Enema pending    HLD (hyperlipidemia) (present on admission)  Assessment & Plan  Statin, ?beneficial in dialysis pt?        VTE prophylaxis: heparin.

## 2017-07-18 PROBLEM — I21.4 NSTEMI (NON-ST ELEVATED MYOCARDIAL INFARCTION) (HCC): Status: ACTIVE | Noted: 2017-07-18

## 2017-07-18 LAB
ALBUMIN SERPL BCP-MCNC: 2.8 G/DL (ref 3.2–4.9)
ALBUMIN SERPL BCP-MCNC: 3.1 G/DL (ref 3.2–4.9)
ALBUMIN/GLOB SERPL: 1 G/DL
ALBUMIN/GLOB SERPL: 1 G/DL
ALP SERPL-CCNC: 103 U/L (ref 30–99)
ALP SERPL-CCNC: 113 U/L (ref 30–99)
ALT SERPL-CCNC: 10 U/L (ref 2–50)
ALT SERPL-CCNC: 9 U/L (ref 2–50)
ANION GAP SERPL CALC-SCNC: 5 MMOL/L (ref 0–11.9)
ANION GAP SERPL CALC-SCNC: 9 MMOL/L (ref 0–11.9)
APTT PPP: 36.9 SEC (ref 24.7–36)
AST SERPL-CCNC: 19 U/L (ref 12–45)
AST SERPL-CCNC: 22 U/L (ref 12–45)
BILIRUB SERPL-MCNC: 0.6 MG/DL (ref 0.1–1.5)
BILIRUB SERPL-MCNC: 0.7 MG/DL (ref 0.1–1.5)
BUN SERPL-MCNC: 20 MG/DL (ref 8–22)
BUN SERPL-MCNC: 27 MG/DL (ref 8–22)
CALCIUM SERPL-MCNC: 11 MG/DL (ref 8.5–10.5)
CALCIUM SERPL-MCNC: 12.4 MG/DL (ref 8.5–10.5)
CHLORIDE SERPL-SCNC: 95 MMOL/L (ref 96–112)
CHLORIDE SERPL-SCNC: 96 MMOL/L (ref 96–112)
CO2 SERPL-SCNC: 29 MMOL/L (ref 20–33)
CO2 SERPL-SCNC: 33 MMOL/L (ref 20–33)
CREAT SERPL-MCNC: 3.65 MG/DL (ref 0.5–1.4)
CREAT SERPL-MCNC: 4.37 MG/DL (ref 0.5–1.4)
EKG IMPRESSION: NORMAL
ERYTHROCYTE [DISTWIDTH] IN BLOOD BY AUTOMATED COUNT: 59.5 FL (ref 35.9–50)
ERYTHROCYTE [DISTWIDTH] IN BLOOD BY AUTOMATED COUNT: 61.1 FL (ref 35.9–50)
ERYTHROCYTE [DISTWIDTH] IN BLOOD BY AUTOMATED COUNT: 62.1 FL (ref 35.9–50)
GFR SERPL CREATININE-BSD FRML MDRD: 10 ML/MIN/1.73 M 2
GFR SERPL CREATININE-BSD FRML MDRD: 12 ML/MIN/1.73 M 2
GLOBULIN SER CALC-MCNC: 2.9 G/DL (ref 1.9–3.5)
GLOBULIN SER CALC-MCNC: 3.1 G/DL (ref 1.9–3.5)
GLUCOSE BLD-MCNC: 106 MG/DL (ref 65–99)
GLUCOSE BLD-MCNC: 113 MG/DL (ref 65–99)
GLUCOSE BLD-MCNC: 62 MG/DL (ref 65–99)
GLUCOSE BLD-MCNC: 63 MG/DL (ref 65–99)
GLUCOSE SERPL-MCNC: 58 MG/DL (ref 65–99)
GLUCOSE SERPL-MCNC: 84 MG/DL (ref 65–99)
HCT VFR BLD AUTO: 25.7 % (ref 37–47)
HCT VFR BLD AUTO: 26.3 % (ref 37–47)
HCT VFR BLD AUTO: 28.2 % (ref 37–47)
HGB BLD-MCNC: 7.9 G/DL (ref 12–16)
HGB BLD-MCNC: 8.1 G/DL (ref 12–16)
HGB BLD-MCNC: 8.6 G/DL (ref 12–16)
LACTATE BLD-SCNC: 1.9 MMOL/L (ref 0.5–2)
LACTATE BLD-SCNC: 2 MMOL/L (ref 0.5–2)
MAGNESIUM SERPL-MCNC: 2.4 MG/DL (ref 1.5–2.5)
MCH RBC QN AUTO: 31.6 PG (ref 27–33)
MCH RBC QN AUTO: 31.7 PG (ref 27–33)
MCH RBC QN AUTO: 32.3 PG (ref 27–33)
MCHC RBC AUTO-ENTMCNC: 30.5 G/DL (ref 33.6–35)
MCHC RBC AUTO-ENTMCNC: 30.7 G/DL (ref 33.6–35)
MCHC RBC AUTO-ENTMCNC: 30.8 G/DL (ref 33.6–35)
MCV RBC AUTO: 102.8 FL (ref 81.4–97.8)
MCV RBC AUTO: 104.1 FL (ref 81.4–97.8)
MCV RBC AUTO: 104.8 FL (ref 81.4–97.8)
PLATELET # BLD AUTO: 129 K/UL (ref 164–446)
PLATELET # BLD AUTO: 129 K/UL (ref 164–446)
PLATELET # BLD AUTO: 141 K/UL (ref 164–446)
PMV BLD AUTO: 10.6 FL (ref 9–12.9)
PMV BLD AUTO: 10.8 FL (ref 9–12.9)
PMV BLD AUTO: 10.9 FL (ref 9–12.9)
POTASSIUM SERPL-SCNC: 4.6 MMOL/L (ref 3.6–5.5)
POTASSIUM SERPL-SCNC: 4.9 MMOL/L (ref 3.6–5.5)
PROT SERPL-MCNC: 5.7 G/DL (ref 6–8.2)
PROT SERPL-MCNC: 6.2 G/DL (ref 6–8.2)
RBC # BLD AUTO: 2.5 M/UL (ref 4.2–5.4)
RBC # BLD AUTO: 2.51 M/UL (ref 4.2–5.4)
RBC # BLD AUTO: 2.71 M/UL (ref 4.2–5.4)
SODIUM SERPL-SCNC: 133 MMOL/L (ref 135–145)
SODIUM SERPL-SCNC: 134 MMOL/L (ref 135–145)
TROPONIN I SERPL-MCNC: 0.12 NG/ML (ref 0–0.04)
TROPONIN I SERPL-MCNC: 1.35 NG/ML (ref 0–0.04)
TROPONIN I SERPL-MCNC: 2.21 NG/ML (ref 0–0.04)
TROPONIN I SERPL-MCNC: 2.46 NG/ML (ref 0–0.04)
TROPONIN I SERPL-MCNC: 2.61 NG/ML (ref 0–0.04)
WBC # BLD AUTO: 5.5 K/UL (ref 4.8–10.8)
WBC # BLD AUTO: 5.9 K/UL (ref 4.8–10.8)
WBC # BLD AUTO: 6.1 K/UL (ref 4.8–10.8)

## 2017-07-18 PROCEDURE — 700102 HCHG RX REV CODE 250 W/ 637 OVERRIDE(OP): Performed by: NURSE PRACTITIONER

## 2017-07-18 PROCEDURE — 84484 ASSAY OF TROPONIN QUANT: CPT

## 2017-07-18 PROCEDURE — 770020 HCHG ROOM/CARE - TELE (206)

## 2017-07-18 PROCEDURE — 93005 ELECTROCARDIOGRAM TRACING: CPT | Performed by: INTERNAL MEDICINE

## 2017-07-18 PROCEDURE — 99233 SBSQ HOSP IP/OBS HIGH 50: CPT | Performed by: INTERNAL MEDICINE

## 2017-07-18 PROCEDURE — 83735 ASSAY OF MAGNESIUM: CPT

## 2017-07-18 PROCEDURE — A9270 NON-COVERED ITEM OR SERVICE: HCPCS | Performed by: NURSE PRACTITIONER

## 2017-07-18 PROCEDURE — 83605 ASSAY OF LACTIC ACID: CPT

## 2017-07-18 PROCEDURE — 80053 COMPREHEN METABOLIC PANEL: CPT

## 2017-07-18 PROCEDURE — 82962 GLUCOSE BLOOD TEST: CPT

## 2017-07-18 PROCEDURE — 85027 COMPLETE CBC AUTOMATED: CPT

## 2017-07-18 PROCEDURE — 700102 HCHG RX REV CODE 250 W/ 637 OVERRIDE(OP): Performed by: INTERNAL MEDICINE

## 2017-07-18 PROCEDURE — 36415 COLL VENOUS BLD VENIPUNCTURE: CPT

## 2017-07-18 PROCEDURE — A9270 NON-COVERED ITEM OR SERVICE: HCPCS | Performed by: INTERNAL MEDICINE

## 2017-07-18 PROCEDURE — 93010 ELECTROCARDIOGRAM REPORT: CPT | Mod: 76 | Performed by: INTERNAL MEDICINE

## 2017-07-18 PROCEDURE — 700101 HCHG RX REV CODE 250

## 2017-07-18 PROCEDURE — 700111 HCHG RX REV CODE 636 W/ 250 OVERRIDE (IP)

## 2017-07-18 PROCEDURE — 700111 HCHG RX REV CODE 636 W/ 250 OVERRIDE (IP): Performed by: INTERNAL MEDICINE

## 2017-07-18 PROCEDURE — 85730 THROMBOPLASTIN TIME PARTIAL: CPT

## 2017-07-18 RX ORDER — DEXTROSE MONOHYDRATE 25 G/50ML
INJECTION, SOLUTION INTRAVENOUS
Status: COMPLETED
Start: 2017-07-18 | End: 2017-07-18

## 2017-07-18 RX ORDER — DEXTROSE MONOHYDRATE 25 G/50ML
25 INJECTION, SOLUTION INTRAVENOUS
Status: DISCONTINUED | OUTPATIENT
Start: 2017-07-18 | End: 2017-07-20 | Stop reason: HOSPADM

## 2017-07-18 RX ORDER — HEPARIN SODIUM 1000 [USP'U]/ML
2200 INJECTION, SOLUTION INTRAVENOUS; SUBCUTANEOUS PRN
Status: DISCONTINUED | OUTPATIENT
Start: 2017-07-18 | End: 2017-07-19

## 2017-07-18 RX ADMIN — RENAGEL 800 MG: 400 TABLET ORAL at 22:18

## 2017-07-18 RX ADMIN — RENAGEL 800 MG: 400 TABLET ORAL at 08:55

## 2017-07-18 RX ADMIN — HEPARIN SODIUM 5000 UNITS: 5000 INJECTION, SOLUTION INTRAVENOUS; SUBCUTANEOUS at 06:14

## 2017-07-18 RX ADMIN — DEXTROSE MONOHYDRATE 25 ML: 25 INJECTION, SOLUTION INTRAVENOUS at 21:38

## 2017-07-18 RX ADMIN — HYDRALAZINE HYDROCHLORIDE 10 MG: 10 TABLET, FILM COATED ORAL at 06:14

## 2017-07-18 RX ADMIN — OMEPRAZOLE 20 MG: 20 CAPSULE, DELAYED RELEASE ORAL at 06:14

## 2017-07-18 RX ADMIN — HYDRALAZINE HYDROCHLORIDE 10 MG: 10 TABLET, FILM COATED ORAL at 13:32

## 2017-07-18 RX ADMIN — OMEPRAZOLE 20 MG: 20 CAPSULE, DELAYED RELEASE ORAL at 15:27

## 2017-07-18 RX ADMIN — ASPIRIN 325 MG: 325 TABLET, DELAYED RELEASE ORAL at 13:32

## 2017-07-18 RX ADMIN — HYDRALAZINE HYDROCHLORIDE 10 MG: 10 TABLET, FILM COATED ORAL at 22:18

## 2017-07-18 RX ADMIN — BISACODYL 10 MG: 10 SUPPOSITORY RECTAL at 13:32

## 2017-07-18 RX ADMIN — ISOSORBIDE DINITRATE 10 MG: 10 TABLET ORAL at 22:18

## 2017-07-18 RX ADMIN — METOPROLOL SUCCINATE 25 MG: 25 TABLET, EXTENDED RELEASE ORAL at 08:55

## 2017-07-18 RX ADMIN — Medication 325 MG: at 08:55

## 2017-07-18 RX ADMIN — SIMVASTATIN 40 MG: 40 TABLET, FILM COATED ORAL at 22:18

## 2017-07-18 RX ADMIN — ISOSORBIDE DINITRATE 10 MG: 10 TABLET ORAL at 08:56

## 2017-07-18 RX ADMIN — DEXTROSE MONOHYDRATE 25 ML: 500 INJECTION PARENTERAL at 21:38

## 2017-07-18 RX ADMIN — STANDARDIZED SENNA CONCENTRATE AND DOCUSATE SODIUM 2 TABLET: 8.6; 5 TABLET, FILM COATED ORAL at 22:18

## 2017-07-18 RX ADMIN — HEPARIN SODIUM 2200 UNITS: 1000 INJECTION, SOLUTION INTRAVENOUS; SUBCUTANEOUS at 18:00

## 2017-07-18 RX ADMIN — ISOSORBIDE DINITRATE 10 MG: 10 TABLET ORAL at 15:38

## 2017-07-18 RX ADMIN — STANDARDIZED SENNA CONCENTRATE AND DOCUSATE SODIUM 2 TABLET: 8.6; 5 TABLET, FILM COATED ORAL at 08:56

## 2017-07-18 RX ADMIN — LOSARTAN POTASSIUM 25 MG: 25 TABLET, FILM COATED ORAL at 08:56

## 2017-07-18 RX ADMIN — TRAMADOL HYDROCHLORIDE 50 MG: 50 TABLET, COATED ORAL at 22:18

## 2017-07-18 RX ADMIN — ONDANSETRON 4 MG: 2 INJECTION INTRAMUSCULAR; INTRAVENOUS at 09:25

## 2017-07-18 RX ADMIN — HEPARIN SODIUM 900 UNITS/HR: 5000 INJECTION, SOLUTION INTRAVENOUS at 11:35

## 2017-07-18 ASSESSMENT — PAIN SCALES - GENERAL
PAINLEVEL_OUTOF10: 0
PAINLEVEL_OUTOF10: 0
PAINLEVEL_OUTOF10: 5
PAINLEVEL_OUTOF10: 5
PAINLEVEL_OUTOF10: 1
PAINLEVEL_OUTOF10: 5
PAINLEVEL_OUTOF10: 0

## 2017-07-18 ASSESSMENT — ENCOUNTER SYMPTOMS
WEAKNESS: 1
SHORTNESS OF BREATH: 0

## 2017-07-18 NOTE — PROGRESS NOTES
[facility] Progress Note    Hemodialysis treatment ordered today per Dr. Marquez x 3 hours. Treatment initiated at 1706, ended at 2006.     Patient tolerated tx; see paper flow sheet for details.     Net UF 1,600 mL.     Needles removed from access site. Dressings applied and sites held x 10 minutes; verified no bleeding. Positive bruit/thrill post tx. Staff RN to monitor AVF for breakthrough bleeding. Should breakthrough bleeding occur, staff RN to apply pressure to access sites until bleeding resolved. Notify Dialysis and Nephrologist for follow-up.    Report given to Primary RN.

## 2017-07-18 NOTE — CARE PLAN
Problem: Bowel/Gastric:  Goal: Normal bowel function is maintained or improved  Outcome: PROGRESSING SLOWER THAN EXPECTED  Patient states she feels constipated, bowel protocol initiated, no significant bowel movement at this time    Problem: Mobility  Goal: Risk for activity intolerance will decrease  Outcome: PROGRESSING SLOWER THAN EXPECTED  Encourage movement by patient, when attempted to sit patient edge of bed, she was unable to move legs on her own and was unable to support her own posture when on edge of bed, moved back into bed for safety

## 2017-07-18 NOTE — CARE PLAN
Problem: Nutritional:  Goal: Achieve adequate nutritional intake  Patient will consume 50% of meals  Intervention: Other (see comments)  Pending possible SLP evaluation, Pt may have texture change

## 2017-07-18 NOTE — CONSULTS
"Chief Complaint   Patient presents with   • Weakness     Pt sent from [facility] for generalized weakness and felt that pt was took weak to recieve dialysis.  Pt is poor historian but per report pt has been getting increasingly weak over the past month or so.  Pt has kyphosis of her back and is slumped forward upon arrival.     • Rectal Pain     Pt reports rectal pain.  Pt is unsure of LBM.        Requesting provider: Dr. Liang    HPI: 82 year old who is well known to the service, as well as having taken care of her since 2010. Patient has been a resident of [facility], and has been declining for the last 2 years or so. However, since march, she has been in the ER in march, April, admitted in May and June as well. She has generally had FTT, however, has also had GIB. She had increasing weakness to the point where she could not go to dialysis and was brought into the ER. POA is present. She is weak, and has poor appetite. Labs show hyperkalemia and mild hyponatremia.      Past Medical History   Diagnosis Date   • GOUT    • other      \"thick blood\"   • Hypertension    • CKD (chronic kidney disease)    • Renal disorder    • Arrhythmia    • Heart murmur    • MEDICAL HOME 4/23/2013   • Arthritis      left knee   • DVT (deep venous thrombosis) (CMS-[facility])      history of DVT 3 times   • CATARACT      gabi surgery  completed   • Preoperative clearance 7/15/2013   • Unspecified hemorrhagic conditions      on coumadin   • Dialysis      M, W, F in [place]   • Pneumonia 5/24/2014   • Other specified disorder of intestines      constipation   • ASTHMA      no meds necessary since moving to [place] from [place]   • Asthma      inhalers as needed   • Anemia    • Dental disorder      dentures   • Falls    • Anginal syndrome (CMS-[facility])    • Glaucoma    • Fall    • Indigestion    • Disorder of thyroid    • Chronic anticoagulation    • Moderate mitral regurgitation    • PAH (pulmonary artery hypertension) - liekly due to mitral regurgitation " but with history of thromboembolic disease        Social History   Substance Use Topics   • Smoking status: Never Smoker    • Smokeless tobacco: Never Used   • Alcohol Use: No       Family History   Problem Relation Age of Onset   • Hypertension Mother        Allergies   Allergen Reactions   • Nkda [No Known Drug Allergy]        Review of Systems   Constitutional: Positive for malaise/fatigue.   Neurological: Positive for weakness.       Physical Exam   Constitutional: She appears lethargic. She appears cachectic.   Cardiovascular: Normal rate and regular rhythm.    Pulmonary/Chest: Effort normal and breath sounds normal.   Abdominal: Soft. Bowel sounds are normal.   Musculoskeletal: She exhibits edema. She exhibits no tenderness.   Neurological: She appears lethargic.   lethargic       LABS:  Recent Labs      07/17/17   1250   WBC  6.3   RBC  3.36*   HEMOGLOBIN  10.6*   HEMATOCRIT  34.9*   MCV  103.9*   MCH  31.5   RDW  62.3*   PLATELETCT  152*   MPV  10.8   NEUTSPOLYS  75.10*   LYMPHOCYTES  12.80*   MONOCYTES  8.50   EOSINOPHILS  2.80   BASOPHILS  0.60     Recent Labs      07/17/17   1250   SODIUM  133*   POTASSIUM  6.3*   CHLORIDE  93*   CO2  33   GLUCOSE  63*   BUN  36*       STUDIES:    Assessment:  1. ESRD, normally on HD MWF, currently too weak to go to dialysis  2. Failure to thrive  3. Hyperkalemia, acute, due to ESRD  4. Anemia of CKD  5. Dementia, longstanding, and appears to be worse  6. History of DVT, not on anticoagulation due to GIB  7. HTN    Plan:  1. Urgent HD for hyperkalemia  2. Long term discussion regarding EOL  3. D/w JON Campos

## 2017-07-18 NOTE — ASSESSMENT & PLAN NOTE
Elevating troponin on 7/18. I called and consulted Dr. Napier, Cardiology. Started heparin gtt. Already on isordil, BB. Cannot do ACEI because of renal failure. Consider nitro gtt if hypertension resistant, or symptomatic (currently not sympromatic)

## 2017-07-18 NOTE — RESPIRATORY CARE
COPD EDUCATION by COPD CLINICAL EDUCATOR  7/18/2017 at 6:57 AM by Carolina Ann     Patient reviewed by COPD education team. Patient does not qualify for COPD program.

## 2017-07-18 NOTE — DIETARY
"Nutrition Services - Poor PO     83 YO F admitted r/t hyperkalemia     Past Medical History   Diagnosis Date   • GOUT    • other      \"thick blood\"   • Hypertension    • CKD (chronic kidney disease)    • Renal disorder    • Arrhythmia    • Heart murmur    • MEDICAL HOME 4/23/2013   • Arthritis      left knee   • DVT (deep venous thrombosis) (CMS-HCC)      history of DVT 3 times   • CATARACT      gabi surgery  completed   • Preoperative clearance 7/15/2013   • Unspecified hemorrhagic conditions      on coumadin   • Dialysis      M, W, F in [place]   • Pneumonia 5/24/2014   • Other specified disorder of intestines      constipation   • ASTHMA      no meds necessary since moving to [place] from [place]   • Asthma      inhalers as needed   • Anemia    • Dental disorder      dentures   • Falls    • Anginal syndrome (CMS-HCC)    • Glaucoma    • Fall    • Indigestion    • Disorder of thyroid    • Chronic anticoagulation    • Moderate mitral regurgitation    • PAH (pulmonary artery hypertension) - liekly due to mitral regurgitation but with history of thromboembolic disease        Pertinent Labs: Na 134, Cr 3.65, GFR 14, troponin 1.35, corrected calcium 11.96   Pertinent Meds: dulcolax, ferrous sulfate, hydralazine, isordil, cozaar, metoprolol, prilosec, senna-docusate, renagel, zocor  GI: Last BM PTA   WT: 54.1kg  Body mass index is 21.81 kg/(m^2).  Pt unable to answer questions r/t dry weight/weight loss   SKIN: no pressure areas documented at this time   Edema 2+ to BL LE     Diet: renal   PO intake: poor     Pt appears frail, slumped over to her left-side and confused/poor historian. Could not identify where she was or what day of the month it is per CNA interview w/ RD in the room. Pt asked for a bag to vomit and/or spit bites of breakfast into. Discussed need for SLP evaluation w/ nursing     PLAN/RECOMMEND     Consider SLP evaluation r/t poor PO w/ confusion and bringing food back up.    Recommend between meal supplements " to provide additional Kcal/protein r/t HD and poor PO, pending speech evaluation for texture. - MD/nursing aware      Encourage PO intake. Nutrition Rep to see patient daily for meal preferences. Please document PO intake as percentage of meals consumed.     RD following

## 2017-07-18 NOTE — DISCHARGE PLANNING
Care Transition Team Assessment  IHD met with patient at bedside. Patient lives in a 1 story home by herself , she has support from her children ,brother and [employer] members. Patient drives herself to providers appointments . Patient bother will be taking patient home after discharged from hospital. Patient does not use any DME, O2 or any other services. Patient does not expect to be discharged from hospital with new services.    Information Source  Orientation : Oriented x 4  Information Given By: Patient  Informant's Name: Skye Oneal  Who is responsible for making decisions for patient? : Patient         Elopement Risk  Legal Hold: No  Ambulatory or Self Mobile in Wheelchair: No-Not an Elopement Risk  Elopement Risk: Not at Risk for Elopement    Interdisciplinary Discharge Planning  Does Admitting Nurse Feel This Could be a Complex Discharge?: No  Primary Care Physician: dr. jesus  Lives with - Patient's Self Care Capacity: Adult Children  Patient or legal guardian wants to designate a caregiver (see row info): No  Support Systems: Children, Family Member(s)  Housing / Facility: 1 Story House  Do You Take your Prescribed Medications Regularly: Yes  Able to Return to Previous ADL's: Future Time w/Therapy  Mobility Issues: Yes  Prior Services: None  Patient Expects to be Discharged to::  (rehab or home)  Assistance Needed: Yes  Durable Medical Equipment: Unknown    Discharge Preparedness  What is your plan after discharge?: Home with help  What are your discharge supports?: Other (comment), Sibling, Child (Cong[employer] members )  Prior Functional Level: Ambulatory, Independent with Activities of Daily Living, Independent with Medication Management, Drives Self  Difficulity with ADLs: None  Difficulity with IADLs: None    Functional Assesment  Prior Functional Level: Ambulatory, Independent with Activities of Daily Living, Independent with Medication Management, Drives Self    Finances  Financial Barriers to Discharge:  No  Prescription Coverage: Yes ([facility] [address] )    Vision / Hearing Impairment  Vision Impairment : Yes  Right Eye Vision: Impaired, Wears Glasses  Left Eye Vision: Impaired, Wears Glasses  Hearing Impairment : Yes  Hearing Impairment: Both Ears, Hearing Device Not Available  Does Pt Need Special Equipment for the Hearing Impaired?: No    Values / Beliefs / Concerns  Values / Beliefs Concerns : No         Domestic Abuse  Have you ever been the victim of abuse or violence?: No  Physical Abuse or Sexual Abuse: No  Verbal Abuse or Emotional Abuse: No  Possible Abuse Reported to:: Not Applicable    Psychological Assessment  History of Substance Abuse: None  History of Psychiatric Problems: No    Discharge Risks or Barriers  Discharge risks or barriers?: No    Anticipated Discharge Information  Anticipated discharge disposition: Discharge needs currently unknown  Discharge Address: [address] [place] [id number]  Discharge Contact Phone Number: [phone]

## 2017-07-18 NOTE — PROGRESS NOTES
"Pt complains of pain, declines pain medication stating \"I am the one in pain and all of you don't understand the pain and discomfort I am in.\" Offered pain medications all throughout the day and refused. Expresses the need to have a BM but also declines due to the pain that occurs.Patient states \"doctors use medications on animals before they give them to people. I am the animal and are giving me whatever they want.\" Explained to the pt that the medications given were medications that she takes at home. Notified RN. Bed in lowest position, call light within reach.   "

## 2017-07-18 NOTE — CARE PLAN
Problem: Safety  Goal: Will remain free from falls  Outcome: PROGRESSING AS EXPECTED  Intervention: Assess risk factors for falls    07/18/17 0100   OTHER   Fall Risk High Risk to Fall - 2 or more points    Risk for Injury-Any positive answers results in the pt being at high risk for fall related injury Not Applicable   Mobility Status Assessment 1-1 Healthcare Provider Required for Assistance with Ambulation & Transfer   History of fall 2   Pt Calls for Assistance Yes       Intervention: Implement fall precautions    07/18/17 0100   OTHER   Environmental Precautions Treaded Slipper Socks on Patient;Personal Belongings, Wastebasket, Call Bell etc. in Easy Reach;Report Given to Other Health Care Providers Regarding Fall Risk;Transferred to Stronger Side;Mobility Assessed & Appropriate Sign Placed;Communication Sign for Patients & Families;Bed in Low Position   IV Pole on Same Side of Bed as Bathroom Yes   Bedrails Bedrails Closest to Bathroom Down   Chair/Bed Strip Alarm Yes - Alarm On           Problem: Pain Management  Goal: Pain level will decrease to patient’s comfort goal  Outcome: PROGRESSING AS EXPECTED    07/17/17 2208 07/18/17 0000   OTHER   Nurse Pain Scale 0 - 10  --  1   Comfort Goal 2 --          Problem: Skin Integrity  Goal: Risk for impaired skin integrity will decrease  Outcome: PROGRESSING AS EXPECTED  Intervention: Implement precautions to protect skin integrity in collaboration with the interdisciplinary team    07/18/17 0000 07/18/17 0100   OTHER   Skin Preventative Measures --  Pillows in Use for Support / Positioning   Bed Types --  Pressure Redistribution Mattress (Atmosair)   Friction Interventions --  Draw Sheet / Pad Used for Repositioning   Moisturizers --  Moisturizer    Activity  --  Bed   Patient Turns / Repositioning Left Side --    Assistance / Tolerance for Turning/Repositioning Assistance of One --    Nutrition Consult Ordered --  Yes, Consult has been Placed   Vitamin Therapy in  Use --  No

## 2017-07-18 NOTE — CONSULTS
"Cardiology Consult Note:    Channing Napier  Date of Service:    7/18/2017   10:52 AM     Referring MD:  Dr. Soriano    Patient ID:   Name:             Skye James   YOB: 1935  Age:                 82 y.o.  female   MRN:               [id number]                                                             Chief Complaint:      Elevated Troponin    History of Present Illness:    81 y/o female admitted for too weak for HD; Found to have elevated Trop; Denies CP, SOB; EKG showed; Poor historian; main c/o weakness, \"feels bad\" and SOB  SINUS RHYTHM   EARLY PRECORDIAL R/S TRANSITION   LVH BY VOLTAGE    Review of Systems:      Constitutional: Denies fevers, Denies weight changesc/o weakness  Eyes: Denies changes in vision, no eye pain  Ears/Nose/Throat/Mouth: Denies nasal congestion or sore throat   Cardiovascular: - chest pain, - palpitations   Respiratory: + chronic shortness of breath , Denies cough  Gastrointestinal/Hepatic: Denies abdominal pain, nausea, vomiting, diarrhea, constipation or GI bleeding   Genitourinary: Denies dysuria or frequency  Musculoskeletal/Rheum: Denies  joint pain and swelling   Skin: Denies rash  Neurological: Denies headache, confusion, memory loss or focal weakness/parasthesias  Psychiatric: denies mood disorder   Endocrine: Clau thyroid problems  Heme/Oncology/Lymph Nodes: Denies enlarged lymph nodes, denies brusing or known bleeding disorder  All other systems were reviewed and are negative (AMA/CMS criteria)                Past Medical History:   Past Medical History   Diagnosis Date   • GOUT    • other      \"thick blood\"   • Hypertension    • CKD (chronic kidney disease)    • Renal disorder    • Arrhythmia    • Heart murmur    • MEDICAL HOME 4/23/2013   • Arthritis      left knee   • DVT (deep venous thrombosis) (CMS-HCC)      history of DVT 3 times   • CATARACT      gabi surgery  completed   • Preoperative clearance 7/15/2013   • Unspecified hemorrhagic conditions  "     on coumadin   • Dialysis      M, W, F in [place]   • Pneumonia 5/24/2014   • Other specified disorder of intestines      constipation   • ASTHMA      no meds necessary since moving to [place] from [place]   • Asthma      inhalers as needed   • Anemia    • Dental disorder      dentures   • Falls    • Anginal syndrome (CMS-HCC)    • Glaucoma    • Fall    • Indigestion    • Disorder of thyroid    • Chronic anticoagulation    • Moderate mitral regurgitation    • PAH (pulmonary artery hypertension) - liekly due to mitral regurgitation but with history of thromboembolic disease      Active Hospital Problems    Diagnosis   • ESRD (end stage renal disease) on dialysis (CMS-HCC) [N18.6, Z99.2]     Priority: Medium   • HLD (hyperlipidemia) [E78.5]     Priority: Low   • Hyperkalemia [E87.5]   • CN (constipation) [K59.00]   • GIB (gastrointestinal bleeding) [K92.2]   • HTN (hypertension) [I10]   • Dementia [F03.90]   • PAH (pulmonary artery hypertension) - liekly due to mitral regurgitation but with history of thromboembolic disease [I27.2]   • History of DVT of lower extremity [Z86.718]       Past Surgical History:  Past Surgical History   Procedure Laterality Date   • Tonsillectomy     • Hysterectomy laparoscopy       L side   • Av fistula creation  6/22/2010     Performed by MICHA KIRBY at [facility]   • Recovery  4/3/2012     Performed by SURGERY, IR-RECOVERY at SURGERY SAME DAY [facility] ORS   • Cataract phaco with iol  6/20/2012     Performed by LEO RENDON at SURGERY [facility] ORS   • Recovery  11/27/2012     Performed by Ir-Recovery Surgery at SURGERY SAME DAY [facility] ORS   • Recovery  7/16/2013     Performed by Ir-Recovery Surgery at SURGERY SAME DAY [facility] ORS   • Recovery  8/5/2014     Performed by Ir-Recovery Surgery at SURGERY SAME DAY [facility] ORS   • Colonoscopy - endo N/A 6/22/2017     Procedure: COLONOSCOPY - ENDO;  Surgeon: Jeff Comer M.D.;  Location: ENDOSCOPY [facility];   Service:        Hospital Medications:    Current facility-administered medications:   •  MD ALERT...HEPARIN WEIGHT BASED PROTOCOL Pharmacist to implement, , Other, PRN, Ryan Soriano M.D.  •  heparin 1000 units/mL injection 2,200 Units, 2,200 Units, Intravenous, PRN **AND** heparin infusion 25,000 units in 500 ml 0.45% nacl, , Intravenous, Continuous **AND** Action is required: Protocol 440 Heparin Weight Based has been implemented, , , Once **AND** Protocol 440 Heparin Weight Based DO NOT GIVE ANY HEPARIN BOLUS TO STROKE PATIENT, , , CONTINUOUS **AND** Protocol 440 Heparin Weight Based Discontinue Enoxaparin (Lovenox), Dabigatran (Pradaxa), Rivaroxaban (Xarelto), Apixaban (Eliquis), Edoxaban (Savaysa, Lixiana), Fondaparinux (Arixtra) and Argatroban prior to heparin administration, , , Once **AND** Protocol 440 Heparin Weight Based Draw baseline aPTT, PT, and platelet count if not already done, , , CONTINUOUS **AND** Protocol 440 Heparin Weight Based Draw aPTT 6 hours after beginning infusion. , , , CONTINUOUS **AND** Protocol 440 Heparin Weight Based Record Patient Data, , , CONTINUOUS **AND** Protocol 440 Heparin Weight Based INSTRUCTIONS, , , CONTINUOUS **AND** Protocol 440 Heparin Weight Based Review aPTT results 6 hours after infusion is begun as detailed, , , CONTINUOUS **AND** Protocol 440 Heparin Weight Based Draw Platelet count every three days. Contact MD if platelet is 50% lower than baseline count., , , CONTINUOUS **AND** Protocol 440 Heparin Weight Based Adjust heparin to maintain aPTT between 55-96 sec, , , CONTINUOUS **AND** Protocol 440 Heparin Weight Based Order aPTT 6 hours after any rate change or hold until aPTT is therapeutic (55-96 seconds), , , CONTINUOUS **AND** Protocol 440 Heparin Weight Based Documentation and verification, , , CONTINUOUS, Kandice Chung, PHARMD  •  lidocaine (XYLOCAINE) 1 % solution, 1 mL, Intradermal, PRN, Nakul Marquez M.D.  •  NS (BOLUS) infusion 250 mL, 250  mL, Intravenous, DIALYSIS PRN, Nakul Marquez M.D.  •  albumin human 25% solution 12.5 g, 12.5 g, Intravenous, DIALYSIS PRN, Nakul Marquez M.D.  •  heparin 1000 units/mL injection 2,000 Units, 2,000 Units, Intravenous, ACUTE DIALYSIS PRN, Nakul Marquez M.D., Stopped at 07/17/17 1700  •  bisacodyl (DULCOLAX) suppository 10 mg, 10 mg, Rectal, DAILY, Jonas Liang M.D., 10 mg at 07/18/17 0856  •  ferrous sulfate tablet 325 mg, 325 mg, Oral, QDAY with Breakfast, Jonas Liang M.D., 325 mg at 07/18/17 0855  •  hydrALAZINE (APRESOLINE) tablet 10 mg, 10 mg, Oral, Q8HRS, Jonas Liang M.D., 10 mg at 07/18/17 0614  •  isosorbide dinitrate (ISORDIL) tablet 10 mg, 10 mg, Oral, TID, Jonas Liang M.D., 10 mg at 07/18/17 0856  •  losartan (COZAAR) tablet 25 mg, 25 mg, Oral, DAILY, Jonas Liang M.D., 25 mg at 07/18/17 0856  •  metoprolol SR (TOPROL XL) tablet 25 mg, 25 mg, Oral, DAILY, Jonas Liang M.D., 25 mg at 07/18/17 0855  •  omeprazole (PRILOSEC) capsule 20 mg, 20 mg, Oral, BID AC, Jonas Liang M.D., 20 mg at 07/18/17 0614  •  sevelamer (RENAGEL) tablet 800 mg, 800 mg, Oral, BID, Jonas Liang M.D., 800 mg at 07/18/17 0855  •  simvastatin (ZOCOR) tablet 40 mg, 40 mg, Oral, Nightly, Jonas Liang M.D., 40 mg at 07/17/17 2209  •  senna-docusate (PERICOLACE or SENOKOT S) 8.6-50 MG per tablet 2 Tab, 2 Tab, Oral, BID, 2 Tab at 07/18/17 0856 **AND** polyethylene glycol/lytes (MIRALAX) PACKET 1 Packet, 1 Packet, Oral, QDAY PRN **AND** magnesium hydroxide (MILK OF MAGNESIA) suspension 30 mL, 30 mL, Oral, QDAY PRN **AND** bisacodyl (DULCOLAX) suppository 10 mg, 10 mg, Rectal, QDAY PRN, Jonas Liang M.D.  •  Respiratory Care per Protocol, , Nebulization, Continuous RT, Jonas Liang M.D.  •  labetalol (NORMODYNE,TRANDATE) injection 10 mg, 10 mg, Intravenous, Q4HRS PRN, Jonas Liang M.D.  •  ondansetron (ZOFRAN) syringe/vial injection 4 mg, 4 mg, Intravenous, Q4HRS PRN, Jonas Liang M.D., 4  mg at 07/18/17 0925  •  ondansetron (ZOFRAN ODT) dispertab 4 mg, 4 mg, Oral, Q4HRS PRN, Jonas Liang M.D.  •  tramadol (ULTRAM) 50 MG tablet 50 mg, 50 mg, Oral, Q6HRS PRN, Claude Bahena A.P.R.N., 50 mg at 07/17/17 2208  •  pneumococcal 13-Harper Conj Vacc (PREVNAR 13) syringe 0.5 mL, 0.5 mL, Intramuscular, Once PRN, Jonas Liang M.D.    Current Outpatient Medications:  Prescriptions prior to admission   Medication Sig Dispense Refill Last Dose   • Cinacalcet HCl (SENSIPAR) 90 MG Tab Take 1 Tab by mouth every day.   7/17/2017 at 0800   • ferrous sulfate (IRON SUPPLEMENT) 325 (65 FE) MG tablet Take 325 mg by mouth 2 Times a Day.   7/17/2017 at 0730   • acetaminophen (TYLENOL) 325 MG Tab Take 650 mg by mouth every 6 hours as needed.   7/15/2017 at pm   • ondansetron (ZOFRAN ODT) 4 MG TABLET DISPERSIBLE Take 4 mg by mouth every 6 hours as needed for Nausea/Vomiting.   7/16/2017 at am   • Lactobacillus Rhamnosus, GG, (CULTURELLE PO) Take 1 Tab by mouth 3 times a day. Chewable 1 million cell   7/17/2017 at 0800   • omeprazole (PRILOSEC) 20 MG delayed-release capsule Take 1 Cap by mouth 2 times daily, before breakfast and dinner. 30 Cap  7/17/2017 at 0700   • isosorbide dinitrate (ISORDIL) 10 MG Tab Take 1 Tab by mouth 3 times a day. 90 Tab  7/17/2017 at 0800   • hydrALAZINE (APRESOLINE) 10 MG Tab Take 1 Tab by mouth every 8 hours. 90 Tab  7/17/2017 at 0600   • losartan (COZAAR) 25 MG Tab Take 25 mg by mouth every day.   7/17/2017 at 0800   • metoprolol SR (TOPROL XL) 25 MG TABLET SR 24 HR Take 25 mg by mouth every day.   7/17/2017 at 0800   • Sevelamer Carbonate 800 MG Tab Take 1 Tab by mouth 2 Times a Day.   7/17/2017 at 0730   • simvastatin (ZOCOR) 40 MG Tab Take 40 mg by mouth every evening.   7/16/2017 at 2100       Medication Allergy:  Allergies   Allergen Reactions   • Nkda [No Known Drug Allergy]        Family History:     Family History   Problem Relation Age of Onset   • Hypertension Mother        Social  "History:  Social History     Social History   • Marital Status:      Spouse Name: N/A   • Number of Children: N/A   • Years of Education: N/A     Occupational History   • Not on file.     Social History Main Topics   • Smoking status: Never Smoker    • Smokeless tobacco: Never Used   • Alcohol Use: No   • Drug Use: No   • Sexual Activity: No     Other Topics Concern   • Not on file     Social History Narrative         Physical Exam:  Vitals  Weight/BMI: Body mass index is 21.81 kg/(m^2).  Blood pressure 134/56, pulse 71, temperature 36.6 °C (97.8 °F), resp. rate 16, height 1.575 m (5' 2\"), weight 54.1 kg (119 lb 4.3 oz), last menstrual period 05/12/1970, SpO2 97 %, not currently breastfeeding.  Filed Vitals:    07/18/17 0308 07/18/17 0319 07/18/17 0602 07/18/17 0800   BP: 130/54 129/63 112/48 134/56   Pulse: 74 84 82 71   Temp:    36.6 °C (97.8 °F)   Resp: 18 18  16   Height:       Weight:       SpO2: 100% 99%  97%     Oxygen Therapy:  Pulse Oximetry: 97 %, O2 (LPM): 2, O2 Delivery: Silicone Nasal Cannula  General Appearance:  [place] and weak; kyphosis;  Well developed, Well nourished, No acute distress, Non-toxic appearance.   HENT:  Normocephalic, Atraumatic, Oropharynx moist mucous membranes, Poor Dentition: , Nose normal.    Eyes:  PERRLA, EOMI, Conjunctiva normal, No discharge.  Neck:  Normal range of motion, No cervical tenderness, Supple, No stridor, no JVD .  No thyromegaly.  No carotid bruit.  Cardiovascular:  Normal heart rate, Normal rhythm,  S1, S2, no S3,  S4; No gallops; No murmurs, No rubs, .   Extremitites with intact distal pulses, no cyanosis, clubbing or edema.  No heaves, thrills, HJR;  Peripheral pulses: carotid 2+, brachial 2+, radial 2+, ulnar 2+, femoral 2+, popliteal 2+, PT 2+, DP 2+;  Lungs:  Respiratory effort is normal. Normal breath sounds, breath sounds clear to auscultation bilaterally,  no rales, no rhonchi, no wheezing.   Abdomen: Bowel sounds normal, Soft, No tenderness, No " guarding, No rebound, No masses, No hepatosplenomegaly.  Skin: Warm, Dry, No erythema, No rash, no induration or crepitus.  Neurologic: Alert & oriented x 3, Normal motor function, Normal sensory function, No focal deficits noted, cranial nerves II through XII are normal, .  Psychiatric: Affect normal, Judgment normal, Mood normal.      MDM (Data Review):     Records reviewed and summarized in current documentation    Lab Data Review:  Recent Results (from the past 24 hour(s))   CBC WITH DIFFERENTIAL    Collection Time: 07/17/17 12:50 PM   Result Value Ref Range    WBC 6.3 4.8 - 10.8 K/uL    RBC 3.36 (L) 4.20 - 5.40 M/uL    Hemoglobin 10.6 (L) 12.0 - 16.0 g/dL    Hematocrit 34.9 (L) 37.0 - 47.0 %    .9 (H) 81.4 - 97.8 fL    MCH 31.5 27.0 - 33.0 pg    MCHC 30.4 (L) 33.6 - 35.0 g/dL    RDW 62.3 (H) 35.9 - 50.0 fL    Platelet Count 152 (L) 164 - 446 K/uL    MPV 10.8 9.0 - 12.9 fL    Neutrophils-Polys 75.10 (H) 44.00 - 72.00 %    Lymphocytes 12.80 (L) 22.00 - 41.00 %    Monocytes 8.50 0.00 - 13.40 %    Eosinophils 2.80 0.00 - 6.90 %    Basophils 0.60 0.00 - 1.80 %    Immature Granulocytes 0.20 0.00 - 0.90 %    Nucleated RBC 0.00 /100 WBC    Neutrophils (Absolute) 4.76 2.00 - 7.15 K/uL    Lymphs (Absolute) 0.81 (L) 1.00 - 4.80 K/uL    Monos (Absolute) 0.54 0.00 - 0.85 K/uL    Eos (Absolute) 0.18 0.00 - 0.51 K/uL    Baso (Absolute) 0.04 0.00 - 0.12 K/uL    Immature Granulocytes (abs) 0.01 0.00 - 0.11 K/uL    NRBC (Absolute) 0.00 K/uL   COMP METABOLIC PANEL    Collection Time: 07/17/17 12:50 PM   Result Value Ref Range    Sodium 133 (L) 135 - 145 mmol/L    Potassium 6.3 (H) 3.6 - 5.5 mmol/L    Chloride 93 (L) 96 - 112 mmol/L    Co2 33 20 - 33 mmol/L    Anion Gap 7.0 0.0 - 11.9    Glucose 63 (L) 65 - 99 mg/dL    Bun 36 (H) 8 - 22 mg/dL    Creatinine 5.71 (HH) 0.50 - 1.40 mg/dL    Calcium 13.0 (HH) 8.5 - 10.5 mg/dL    AST(SGOT) 21 12 - 45 U/L    ALT(SGPT) 11 2 - 50 U/L    Alkaline Phosphatase 134 (H) 30 - 99 U/L     Total Bilirubin 0.7 0.1 - 1.5 mg/dL    Albumin 3.6 3.2 - 4.9 g/dL    Total Protein 7.2 6.0 - 8.2 g/dL    Globulin 3.6 (H) 1.9 - 3.5 g/dL    A-G Ratio 1.0 g/dL   PROTHROMBIN TIME    Collection Time: 17 12:50 PM   Result Value Ref Range    PT 14.0 12.0 - 14.6 sec    INR 1.05 0.87 - 1.13   APTT    Collection Time: 17 12:50 PM   Result Value Ref Range    APTT 27.5 24.7 - 36.0 sec   ESTIMATED GFR    Collection Time: 17 12:50 PM   Result Value Ref Range    GFR If  9 (A) >60 mL/min/1.73 m 2    GFR If Non African American 7 (A) >60 mL/min/1.73 m 2   EKG (NOW)    Collection Time: 17  1:18 PM   Result Value Ref Range    Report       [facility] Emergency Dept.    Test Date:  2017  Pt Name:    [facility]              Department: ER  MRN:        [id number]                      Room:        27  Gender:     F                            Technician: [id number]  :        [id number]                   Requested By:ROBBIE METCALF  Order #:    [id number]                    Reading MD:    Measurements  Intervals                                Axis  Rate:       72                           P:          67  VT:         172                          QRS:        5  QRSD:       98                           T:          40  QT:         360  QTc:        394    Interpretive Statements  SINUS RHYTHM  Compared to ECG 2017 11:10:14  T-wave abnormality no longer present     ACCU-CHEK GLUCOSE    Collection Time: 17  2:35 AM   Result Value Ref Range    Glucose - Accu-Ck 113 (H) 65 - 99 mg/dL   EKG    Collection Time: 17  2:54 AM   Result Value Ref Range    Report       [facility]    Test Date:  2017  Pt Name:    [facility]              Department: 171  MRN:        [id number]                      Room:       T716  Gender:     F                            Technician: [id number]  :        1935                   Requested By:BRYCE VEGA  Order #:     [id number]                    Reading MD: Rafi Vargas MD    Measurements  Intervals                                Axis  Rate:       70                           P:          51  AZ:         168                          QRS:        -10  QRSD:       94                           T:          3  QT:         400  QTc:        432    Interpretive Statements  SINUS RHYTHM  EARLY PRECORDIAL R/S TRANSITION  LVH BY VOLTAGE  Compared to ECG 07/17/2017 13:18:45  Left ventricular hypertrophy now present    Electronically Signed On 7- 6:49:55 PDT by Rafi Vargas MD     CBC WITHOUT DIFFERENTIAL    Collection Time: 07/18/17  3:24 AM   Result Value Ref Range    WBC 6.1 4.8 - 10.8 K/uL    RBC 2.51 (L) 4.20 - 5.40 M/uL    Hemoglobin 8.1 (L) 12.0 - 16.0 g/dL    Hematocrit 26.3 (L) 37.0 - 47.0 %    .8 (H) 81.4 - 97.8 fL    MCH 32.3 27.0 - 33.0 pg    MCHC 30.8 (L) 33.6 - 35.0 g/dL    RDW 62.1 (H) 35.9 - 50.0 fL    Platelet Count 129 (L) 164 - 446 K/uL    MPV 10.8 9.0 - 12.9 fL   COMP METABOLIC PANEL    Collection Time: 07/18/17  3:24 AM   Result Value Ref Range    Sodium 134 (L) 135 - 145 mmol/L    Potassium 4.6 3.6 - 5.5 mmol/L    Chloride 96 96 - 112 mmol/L    Co2 33 20 - 33 mmol/L    Anion Gap 5.0 0.0 - 11.9    Glucose 84 65 - 99 mg/dL    Bun 20 8 - 22 mg/dL    Creatinine 3.65 (H) 0.50 - 1.40 mg/dL    Calcium 11.0 (H) 8.5 - 10.5 mg/dL    AST(SGOT) 19 12 - 45 U/L    ALT(SGPT) 10 2 - 50 U/L    Alkaline Phosphatase 103 (H) 30 - 99 U/L    Total Bilirubin 0.6 0.1 - 1.5 mg/dL    Albumin 2.8 (L) 3.2 - 4.9 g/dL    Total Protein 5.7 (L) 6.0 - 8.2 g/dL    Globulin 2.9 1.9 - 3.5 g/dL    A-G Ratio 1.0 g/dL   TROPONIN    Collection Time: 07/18/17  3:24 AM   Result Value Ref Range    Troponin I 0.12 (H) 0.00 - 0.04 ng/mL   LACTIC ACID    Collection Time: 07/18/17  3:24 AM   Result Value Ref Range    Lactic Acid 1.9 0.5 - 2.0 mmol/L   MAGNESIUM    Collection Time: 07/18/17  3:24 AM   Result Value Ref Range    Magnesium 2.4  1.5 - 2.5 mg/dL   ESTIMATED GFR    Collection Time: 07/18/17  3:24 AM   Result Value Ref Range    GFR If  14 (A) >60 mL/min/1.73 m 2    GFR If Non  12 (A) >60 mL/min/1.73 m 2   TROPONIN    Collection Time: 07/18/17  7:47 AM   Result Value Ref Range    Troponin I 1.35 (H) 0.00 - 0.04 ng/mL       Imaging/Procedures Review:    Chest Xray:  Reviewed    EKG:   As in HPI. See above    MDM (Assessment and Plan):     Active Hospital Problems    Diagnosis   • ESRD (end stage renal disease) on dialysis (CMS-HCC) [N18.6, Z99.2]     Priority: Medium   • HLD (hyperlipidemia) [E78.5]     Priority: Low   • Hyperkalemia [E87.5]   • CN (constipation) [K59.00]   • GIB (gastrointestinal bleeding) [K92.2]   • HTN (hypertension) [I10]   • Dementia [F03.90]   • PAH (pulmonary artery hypertension) - liekly due to mitral regurgitation but with history of thromboembolic disease [I27.2]   • History of DVT of lower extremity [Z86.718]       Elevation of Trop may be d/t ESRD  Echocardiogram ordered and will review  UFH started and pls tren EKG and Trop  Will give options and possible ischemia w/u's    Case discussed with attending  Will follow  Thx

## 2017-07-18 NOTE — DISCHARGE PLANNING
Pt has OP hemodialysis at [facility]; clinic has been notified of hospitalization.

## 2017-07-18 NOTE — PROGRESS NOTES
Hospital Medicine Progress Note, Adult, Complex               Author: Nakul Marquez Date & Time created: 7/18/2017  2:39 PM     Interval History:  82-year-old with end-stage renal disease who is admitted for failure to thrive, unable to get to dialysis due to weakness. She underwent dialysis yesterday as she had hyperkalemia and hypercalcemia. Electrolytes are improved, though she still has hypercalcemia. Patient is still quite fatigued and weak. Over yesterday was noted to have an elevation in troponin.    Review of Systems:  Review of Systems   Constitutional: Positive for malaise/fatigue.   Respiratory: Negative for shortness of breath.    Cardiovascular: Negative for chest pain.   Neurological: Positive for weakness.       Physical Exam:  Physical Exam   Constitutional: She appears cachectic. She appears ill.   Cardiovascular: Normal rate and regular rhythm.    Pulmonary/Chest: Effort normal and breath sounds normal.   Abdominal: Soft. Bowel sounds are normal.   Musculoskeletal: She exhibits no edema or tenderness.   Skin: Skin is warm and dry.       Labs:        Invalid input(s): [id number]  Recent Labs      07/18/17   0324  07/18/17   0747  07/18/17   1143   TROPONINI  0.12*  1.35*  2.21*     Recent Labs      07/17/17   1250  07/18/17   0324   SODIUM  133*  134*   POTASSIUM  6.3*  4.6   CHLORIDE  93*  96   CO2  33  33   BUN  36*  20   CREATININE  5.71*  3.65*   MAGNESIUM   --   2.4   CALCIUM  13.0*  11.0*     Recent Labs      07/17/17   1250  07/18/17   0324   ALTSGPT  11  10   ASTSGOT  21  19   ALKPHOSPHAT  134*  103*   TBILIRUBIN  0.7  0.6   GLUCOSE  63*  84     Recent Labs      07/17/17   1250  07/18/17   0324   RBC  3.36*  2.51*   HEMOGLOBIN  10.6*  8.1*   HEMATOCRIT  34.9*  26.3*   PLATELETCT  152*  129*   PROTHROMBTM  14.0   --    APTT  27.5   --    INR  1.05   --      Recent Labs      07/17/17   1250  07/18/17   0324   WBC  6.3  6.1   NEUTSPOLYS  75.10*   --    LYMPHOCYTES  12.80*   --     MONOCYTES  8.50   --    EOSINOPHILS  2.80   --    BASOPHILS  0.60   --    ASTSGOT  21  19   ALTSGPT  11  10   ALKPHOSPHAT  134*  103*   TBILIRUBIN  0.7  0.6           Hemodynamics:  Temp (24hrs), Av.5 °C (97.7 °F), Min:36.3 °C (97.4 °F), Max:36.8 °C (98.2 °F)  Temperature: 36.8 °C (98.2 °F)  Pulse  Av.1  Min: 65  Max: 84Heart Rate (Monitored): 72  Blood Pressure : 144/69 mmHg, NIBP: (!) 178/58 mmHg     Respiratory:    Respiration: 17, Pulse Oximetry: 100 %        RUL Breath Sounds: Diminished, RML Breath Sounds: Diminished, RLL Breath Sounds: Diminished, MECCA Breath Sounds: Diminished, LLL Breath Sounds: Diminished  Fluids:    Intake/Output Summary (Last 24 hours) at 17 1439  Last data filed at 17 0600   Gross per 24 hour   Intake    750 ml   Output   2200 ml   Net  -1450 ml     Weight: 54.1 kg (119 lb 4.3 oz)  GI/Nutrition:  Orders Placed This Encounter   Procedures   • Diet Order     Standing Status: Standing      Number of Occurrences: 1      Standing Expiration Date:      Order Specific Question:  Diet:     Answer:  Renal [8]     Medical Decision Making, by Problem:  Active Hospital Problems    Diagnosis   • ESRD (end stage renal disease) on dialysis (CMS-HCC) [N18.6, Z99.2]   • HLD (hyperlipidemia) [E78.5]   • NSTEMI (non-ST elevated myocardial infarction) (CMS-HCC) [I21.4]   • Hyperkalemia [E87.5]   • CN (constipation) [K59.00]   • GIB (gastrointestinal bleeding) [K92.2]   • HTN (hypertension) [I10]   • Dementia [F03.90]   • PAH (pulmonary artery hypertension) - liekly due to mitral regurgitation but with history of thromboembolic disease [I27.2]   • History of DVT of lower extremity [Z86.718]       1. End-stage renal disease, on dialysis 3 times a week. Unable to go to dialysis due to weakness  2. Failure to thrive, progressive  3. Hypercalcemia, possibly due to immobility  4. History of GI bleed  5. Dementia, advanced      -We will monitor the patient today  -If the patient has  continued hypercalcemia consider IV bisphosphonate  -End-of-life planning  -We'll follow    Core Measures

## 2017-07-18 NOTE — RESPIRATORY CARE
Respiratory Rapid Response Note    Symptoms not responsive, SpO2 67%    I arrived and pt was already responsive and satting 100%. No distress noted. Rapid team at bedside.      Breath Sounds  RUL Breath Sounds: Diminished (07/18/17 0000)  RML Breath Sounds: Diminished (07/18/17 0000)  RLL Breath Sounds: Fine Crackles (07/18/17 0000)  MECCA Breath Sounds: Diminished (07/18/17 0000)  LLL Breath Sounds: Fine Crackles (07/18/17 0000)

## 2017-07-18 NOTE — PROGRESS NOTES
Calling on call MD. [place] was called for pt being unresponsive.No response after sternal rub. Vitals were taken and documented. Blood sugar 113. Pt O2 was 67 on RA. Pt placed on venti mask at 6L. O2 went to 100%. Pt then was responsive and awake. A&Ox4. Pt stated she was tired. EKG was ordered. Labs drawn.

## 2017-07-18 NOTE — PROGRESS NOTES
Cardiac Summary    SR 63-87  .18/.10/.34  Pt dipped into high 40s but didn't sustain.  Rapid was called shortly after that  Pt went in and out of [facility]

## 2017-07-18 NOTE — PROGRESS NOTES
Report received, pt care assumed, tele box on. VSS, pt assessment complete. Pt aaox 4, but confused at times, no signs of distress noted at this time. POC discussed with pt and verbalizes no questions. Pt c/o of no pain, PRN pain med not administered. Pt denies any additional needs at this time. Bed in lowest position, bed alarm on, pt educated on fall risk and verbalized understanding, call light within reach, will continue to monitor.

## 2017-07-18 NOTE — PROGRESS NOTES
2 RN skin assessment     Pt has old healed wound to sacral area.   Bilateral feet are flaky and dry.  No noted wounds

## 2017-07-19 ENCOUNTER — APPOINTMENT (OUTPATIENT)
Dept: NEPHROLOGY | Facility: MEDICAL CENTER | Age: 82
End: 2017-07-19
Payer: MEDICARE

## 2017-07-19 PROBLEM — E16.2 HYPOGLYCEMIA: Status: ACTIVE | Noted: 2017-07-19

## 2017-07-19 LAB
ALBUMIN SERPL BCP-MCNC: 2.8 G/DL (ref 3.2–4.9)
APTT PPP: 125.6 SEC (ref 24.7–36)
APTT PPP: 160.1 SEC (ref 24.7–36)
BUN SERPL-MCNC: 32 MG/DL (ref 8–22)
CALCIUM SERPL-MCNC: 12 MG/DL (ref 8.5–10.5)
CHLORIDE SERPL-SCNC: 95 MMOL/L (ref 96–112)
CO2 SERPL-SCNC: 29 MMOL/L (ref 20–33)
CREAT SERPL-MCNC: 4.76 MG/DL (ref 0.5–1.4)
EKG IMPRESSION: NORMAL
GFR SERPL CREATININE-BSD FRML MDRD: 9 ML/MIN/1.73 M 2
GLUCOSE BLD-MCNC: 81 MG/DL (ref 65–99)
GLUCOSE SERPL-MCNC: 102 MG/DL (ref 65–99)
LV EJECT FRACT  99904: 60
LV EJECT FRACT MOD 2C 99903: 75.38
LV EJECT FRACT MOD 4C 99902: 69.95
LV EJECT FRACT MOD BP 99901: 72.2
PHOSPHATE SERPL-MCNC: 5.4 MG/DL (ref 2.5–4.5)
POTASSIUM SERPL-SCNC: 5.2 MMOL/L (ref 3.6–5.5)
SODIUM SERPL-SCNC: 132 MMOL/L (ref 135–145)
TROPONIN I SERPL-MCNC: 2.18 NG/ML (ref 0–0.04)
TROPONIN I SERPL-MCNC: 2.3 NG/ML (ref 0–0.04)
TROPONIN I SERPL-MCNC: 2.32 NG/ML (ref 0–0.04)
TROPONIN I SERPL-MCNC: 2.53 NG/ML (ref 0–0.04)

## 2017-07-19 PROCEDURE — 700102 HCHG RX REV CODE 250 W/ 637 OVERRIDE(OP): Performed by: INTERNAL MEDICINE

## 2017-07-19 PROCEDURE — 84484 ASSAY OF TROPONIN QUANT: CPT | Mod: 91

## 2017-07-19 PROCEDURE — A9270 NON-COVERED ITEM OR SERVICE: HCPCS | Performed by: INTERNAL MEDICINE

## 2017-07-19 PROCEDURE — 90935 HEMODIALYSIS ONE EVALUATION: CPT

## 2017-07-19 PROCEDURE — 82962 GLUCOSE BLOOD TEST: CPT

## 2017-07-19 PROCEDURE — 93306 TTE W/DOPPLER COMPLETE: CPT

## 2017-07-19 PROCEDURE — 700111 HCHG RX REV CODE 636 W/ 250 OVERRIDE (IP): Performed by: INTERNAL MEDICINE

## 2017-07-19 PROCEDURE — 770020 HCHG ROOM/CARE - TELE (206)

## 2017-07-19 PROCEDURE — 80069 RENAL FUNCTION PANEL: CPT

## 2017-07-19 PROCEDURE — 700105 HCHG RX REV CODE 258: Performed by: INTERNAL MEDICINE

## 2017-07-19 PROCEDURE — 85730 THROMBOPLASTIN TIME PARTIAL: CPT | Mod: 91

## 2017-07-19 PROCEDURE — 93306 TTE W/DOPPLER COMPLETE: CPT | Mod: 26 | Performed by: INTERNAL MEDICINE

## 2017-07-19 PROCEDURE — 99233 SBSQ HOSP IP/OBS HIGH 50: CPT | Performed by: INTERNAL MEDICINE

## 2017-07-19 PROCEDURE — 36415 COLL VENOUS BLD VENIPUNCTURE: CPT

## 2017-07-19 RX ORDER — HEPARIN SODIUM 5000 [USP'U]/ML
5000 INJECTION, SOLUTION INTRAVENOUS; SUBCUTANEOUS EVERY 8 HOURS
Status: DISCONTINUED | OUTPATIENT
Start: 2017-07-19 | End: 2017-07-20 | Stop reason: HOSPADM

## 2017-07-19 RX ADMIN — OMEPRAZOLE 20 MG: 20 CAPSULE, DELAYED RELEASE ORAL at 17:11

## 2017-07-19 RX ADMIN — ASPIRIN 325 MG: 325 TABLET, DELAYED RELEASE ORAL at 09:07

## 2017-07-19 RX ADMIN — METOPROLOL SUCCINATE 25 MG: 25 TABLET, EXTENDED RELEASE ORAL at 09:07

## 2017-07-19 RX ADMIN — RENAGEL 800 MG: 400 TABLET ORAL at 20:55

## 2017-07-19 RX ADMIN — SIMVASTATIN 40 MG: 40 TABLET, FILM COATED ORAL at 20:55

## 2017-07-19 RX ADMIN — PAMIDRONATE DISODIUM 60 MG: 9 INJECTION, SOLUTION INTRAVENOUS at 17:12

## 2017-07-19 RX ADMIN — LOSARTAN POTASSIUM 25 MG: 25 TABLET, FILM COATED ORAL at 09:07

## 2017-07-19 RX ADMIN — STANDARDIZED SENNA CONCENTRATE AND DOCUSATE SODIUM 2 TABLET: 8.6; 5 TABLET, FILM COATED ORAL at 20:55

## 2017-07-19 RX ADMIN — ISOSORBIDE DINITRATE 10 MG: 10 TABLET ORAL at 20:55

## 2017-07-19 RX ADMIN — HEPARIN SODIUM 5000 UNITS: 5000 INJECTION, SOLUTION INTRAVENOUS; SUBCUTANEOUS at 20:55

## 2017-07-19 RX ADMIN — RENAGEL 800 MG: 400 TABLET ORAL at 09:07

## 2017-07-19 RX ADMIN — STANDARDIZED SENNA CONCENTRATE AND DOCUSATE SODIUM 2 TABLET: 8.6; 5 TABLET, FILM COATED ORAL at 09:07

## 2017-07-19 RX ADMIN — ISOSORBIDE DINITRATE 10 MG: 10 TABLET ORAL at 09:07

## 2017-07-19 RX ADMIN — HYDRALAZINE HYDROCHLORIDE 10 MG: 10 TABLET, FILM COATED ORAL at 20:56

## 2017-07-19 RX ADMIN — Medication 325 MG: at 09:07

## 2017-07-19 RX ADMIN — OMEPRAZOLE 20 MG: 20 CAPSULE, DELAYED RELEASE ORAL at 06:45

## 2017-07-19 RX ADMIN — HYDRALAZINE HYDROCHLORIDE 10 MG: 10 TABLET, FILM COATED ORAL at 06:45

## 2017-07-19 ASSESSMENT — ENCOUNTER SYMPTOMS
HEMOPTYSIS: 0
CONSTIPATION: 1
CLAUDICATION: 0
WEAKNESS: 1
PND: 0
PALPITATIONS: 0
ORTHOPNEA: 0
SPUTUM PRODUCTION: 0
SHORTNESS OF BREATH: 0
COUGH: 0

## 2017-07-19 ASSESSMENT — PAIN SCALES - GENERAL
PAINLEVEL_OUTOF10: 0

## 2017-07-19 NOTE — CARE PLAN
Problem: Knowledge Deficit  Goal: Knowledge of disease process/condition, treatment plan, diagnostic tests, and medications will improve  Intervention: Explain information regarding disease process/condition, treatment plan, diagnostic tests, and medications and document in education  Knowledge of disease process/condition, treatment plan, diagnostic tests, and medications will improve  Educated patient on plan of care, new medications and pain management.             Problem: Skin Integrity  Goal: Risk for impaired skin integrity will decrease  Intervention: Implement precautions to protect skin integrity in collaboration with the interdisciplinary team  Pt evaluated for skin breakdown. Waffle mattress overlay applied to bed. Extra pillows in place. Mepilex in place. Silicon NC in use.

## 2017-07-19 NOTE — PROGRESS NOTES
Hospital Medicine Progress Note, Adult, Complex               Author: Nakul Marquez Date & Time created: 7/19/2017  3:14 PM     Interval History:  82-year-old with end-stage renal disease who is admitted for failure to thrive, unable to get to dialysis due to weakness.     Still feeling quite weak. Calcium elevated again. Due for dialysis today.    Review of Systems:  Review of Systems   Constitutional: Positive for malaise/fatigue.   Respiratory: Negative for shortness of breath.    Cardiovascular: Negative for chest pain.   Neurological: Positive for weakness.       Physical Exam:  Physical Exam   Constitutional: She appears cachectic. She appears ill.   Cardiovascular: Normal rate and regular rhythm.    Pulmonary/Chest: Effort normal and breath sounds normal.   Abdominal: Soft. Bowel sounds are normal.   Musculoskeletal: She exhibits no edema or tenderness.   Skin: Skin is warm and dry.       Labs:        Invalid input(s): [id number]  Recent Labs      07/19/17   0342  07/19/17   0829  07/19/17   1155   TROPONINI  2.53*  2.30*  2.18*     Recent Labs      07/18/17   0324 07/18/17 2109 07/19/17   0006   SODIUM  134*  133*  132*   POTASSIUM  4.6  4.9  5.2   CHLORIDE  96  95*  95*   CO2  33  29  29   BUN  20  27*  32*   CREATININE  3.65*  4.37*  4.76*   MAGNESIUM  2.4   --    --    PHOSPHORUS   --    --   5.4*   CALCIUM  11.0*  12.4*  12.0*     Recent Labs      07/17/17   1250  07/18/17   0324  07/18/17 2109 07/19/17   0006   ALTSGPT  11  10  9   --    ASTSGOT  21  19  22   --    ALKPHOSPHAT  134*  103*  113*   --    TBILIRUBIN  0.7  0.6  0.7   --    GLUCOSE  63*  84  58*  102*     Recent Labs      07/17/17   1250  07/18/17   0324  07/18/17   1729 07/18/17 2109 07/19/17   0006  07/19/17   0829   RBC  3.36*  2.51*  2.50*  2.71*   --    --    HEMOGLOBIN  10.6*  8.1*  7.9*  8.6*   --    --    HEMATOCRIT  34.9*  26.3*  25.7*  28.2*   --    --    PLATELETCT  152*  129*  129*  141*   --    --     PROTHROMBTM  14.0   --    --    --    --    --    APTT  27.5   --   36.9*   --   125.6*  160.1*   INR  1.05   --    --    --    --    --      Recent Labs      17   1250  17   0324  17   1729  17   2109   WBC  6.3  6.1  5.5  5.9   NEUTSPOLYS  75.10*   --    --    --    LYMPHOCYTES  12.80*   --    --    --    MONOCYTES  8.50   --    --    --    EOSINOPHILS  2.80   --    --    --    BASOPHILS  0.60   --    --    --    ASTSGOT  21  19   --   22   ALTSGPT  11  10   --   9   ALKPHOSPHAT  134*  103*   --   113*   TBILIRUBIN  0.7  0.6   --   0.7           Hemodynamics:  Temp (24hrs), Av.6 °C (97.9 °F), Min:36 °C (96.8 °F), Max:37.2 °C (98.9 °F)  Temperature: 37.2 °C (98.9 °F)  Pulse  Av.7  Min: 65  Max: 115   Blood Pressure : 101/74 mmHg     Respiratory:    Respiration: 19, Pulse Oximetry: 100 %, O2 Daily Delivery Respiratory : Silicone Nasal Cannula        RUL Breath Sounds: Diminished, RML Breath Sounds: Diminished, RLL Breath Sounds: Diminished, MECCA Breath Sounds: Diminished, LLL Breath Sounds: Diminished  Fluids:    Intake/Output Summary (Last 24 hours) at 17 1514  Last data filed at 17 1800   Gross per 24 hour   Intake  117.5 ml   Output      0 ml   Net  117.5 ml     Weight: 54.3 kg (119 lb 11.4 oz)  GI/Nutrition:  Orders Placed This Encounter   Procedures   • Diet Order     Standing Status: Standing      Number of Occurrences: 1      Standing Expiration Date:      Order Specific Question:  Diet:     Answer:  Renal [8]     Medical Decision Making, by Problem:  Active Hospital Problems    Diagnosis   • ESRD (end stage renal disease) on dialysis (CMS-HCC) [N18.6, Z99.2]   • HLD (hyperlipidemia) [E78.5]   • NSTEMI (non-ST elevated myocardial infarction) (CMS-HCC) [I21.4]   • Hyperkalemia [E87.5]   • CN (constipation) [K59.00]   • GIB (gastrointestinal bleeding) [K92.2]   • HTN (hypertension) [I10]   • Dementia [F03.90]   • PAH (pulmonary artery hypertension) - liekly due to  mitral regurgitation but with history of thromboembolic disease [I27.2]   • History of DVT of lower extremity [Z86.718]       1. End-stage renal disease, on dialysis 3 times a week. Unable to go to dialysis due to weakness  2. Failure to thrive, progressive  3. Hypercalcemia, possibly due to immobility, symptomatically  4. History of GI bleed  5. Dementia, advanced      -Give pamidronate IV for hypercalcemia, discussed with the patient's with regards to risks and benefits   -Dialysis today  -We'll monitor    Core Measures

## 2017-07-19 NOTE — PROGRESS NOTES
[facility] Hospitalist Progress Note    Date of Service: 2017    Chief Complaint  82 y.o. female admitted 2017 with Weakness and Rectal Pain    Interval Problem Update  Still weak and deconditioned, kenisha at dialysis. Mentation baseline.    Consultants/Specialty  Nephrology  Cardiology, Dr. Karthikeyan Squires skilled planned.        Review of Systems   Unable to perform ROS: acuity of condition      Physical Exam  Laboratory/Imaging   Hemodynamics  Temp (24hrs), Av.6 °C (97.8 °F), Min:36.3 °C (97.4 °F), Max:36.8 °C (98.2 °F)   Temperature: 36.7 °C (98.1 °F)  Pulse  Av.6  Min: 65  Max: 84    Blood Pressure : 130/69 mmHg      Respiratory      Respiration: 18, Pulse Oximetry: 98 %        RUL Breath Sounds: Diminished, RML Breath Sounds: Diminished, RLL Breath Sounds: Diminished, MECCA Breath Sounds: Diminished, LLL Breath Sounds: Diminished    Fluids    Intake/Output Summary (Last 24 hours) at 17 1702  Last data filed at 17 0600   Gross per 24 hour   Intake    750 ml   Output   2200 ml   Net  -1450 ml       Nutrition  Orders Placed This Encounter   Procedures   • Diet Order     Standing Status: Standing      Number of Occurrences: 1      Standing Expiration Date:      Order Specific Question:  Diet:     Answer:  Renal [8]     Physical Exam    Recent Labs      17   1250  17   0324   WBC  6.3  6.1   RBC  3.36*  2.51*   HEMOGLOBIN  10.6*  8.1*   HEMATOCRIT  34.9*  26.3*   MCV  103.9*  104.8*   MCH  31.5  32.3   MCHC  30.4*  30.8*   RDW  62.3*  62.1*   PLATELETCT  152*  129*   MPV  10.8  10.8     Recent Labs      17   1250  17   0324   SODIUM  133*  134*   POTASSIUM  6.3*  4.6   CHLORIDE  93*  96   CO2  33  33   GLUCOSE  63*  84   BUN  36*  20   CREATININE  5.71*  3.65*   CALCIUM  13.0*  11.0*     Recent Labs      17   1250   APTT  27.5   INR  1.05                  Assessment/Plan     ESRD (end stage renal disease) on dialysis (CMS-[facility]) (present on  admission)  Assessment & Plan  Sevelamer, dialysis, nephrology following    History of DVT of lower extremity (present on admission)  Assessment & Plan  Coumadin stopped due to GI bleed, discussed with family, she is likely no longer a good coumadin candidate    PAH (pulmonary artery hypertension) - liekly due to mitral regurgitation but with history of thromboembolic disease (present on admission)  Assessment & Plan  Watch volume, RT/O2  Dialysis to help with volume management    Dementia (present on admission)  Assessment & Plan  Family support    HTN (hypertension) (present on admission)  Assessment & Plan  Coreg, losartan, hydralazine, imdur    GIB (gastrointestinal bleeding) (present on admission)  Assessment & Plan  PPI, off AC    Hyperkalemia  Assessment & Plan  Dialysis today, discussed with Dr. Alma LANDEROS (constipation)  Assessment & Plan  Enema pending    NSTEMI (non-ST elevated myocardial infarction) (CMS-[facility])  Assessment & Plan  Elevating troponin on 7/18. I called and consulted Dr. Napier, Cardiology. Started heparin gtt. Already on isordil, BB. Cannot do ACEI because of renal failure. Consider nitro gtt if hypertension resistant, or symptomatic (currently not sympromatic)    HLD (hyperlipidemia) (present on admission)  Assessment & Plan  Statin, ?beneficial in dialysis pt?    I spent 41 minutes, reviewing the chart, notes, vitals, labs, imaging, ordering labs, evaluating Skye James for assessment, enacting the plan above. 50% of the time was spent in counseling Skye James and  answering questions. I also spoke with Dr. Napier, cardiology. Medical decision making is therefore complex. Time was devoted to counseling and coordinating care including review of records, pertinent lab data and studies, as well as discussing diagnostic evaluation and work up, planned therapeutic interventions and future disposition of care. Where indicated, the assessment and plan reflect discussion of  patient with consultants, other healthcare providers, family members, and additional research needed to obtain further information in formulating the plan of care for Skye James.     Core Measures   Now on hepa gtt

## 2017-07-19 NOTE — PROGRESS NOTES
"Date of Service: 7/19/2017  Chief Complaint:  Chief Complaint   Patient presents with   • Weakness     Pt sent from [facility] for generalized weakness and felt that pt was took weak to recieve dialysis.  Pt is poor historian but per report pt has been getting increasingly weak over the past month or so.  Pt has kyphosis of her back and is slumped forward upon arrival.     • Rectal Pain     Pt reports rectal pain.  Pt is unsure of LBM.      Interval History:  83 y/o female admitted for too weak for HD; Found to have elevated Trop; Denies CP, SOB; EKG showed; Poor historian; main c/o weakness, \"feels bad\" and SOB  Trop has been elevated and stabilized abot 2.5  She is on UFH and dosing adjustment per pharmacy  No cardiac complaints; Isabelle noted; Hypoglycemia noted; discussed with RN  Medical txmt only and Code status discussed; consider DNR  All recent medication, labs, imaging studies and procedures reviewed    Physical Exam   Blood pressure 100/72, pulse 78, temperature 36.4 °C (97.6 °F), resp. rate 16, height 1.575 m (5' 2\"), weight 54.3 kg (119 lb 11.4 oz), last menstrual period 05/12/1970, SpO2 100 %, not currently breastfeeding.    General Appearance:  [place] and weak; kyphosis;  Well developed, Well nourished, No acute distress, Non-toxic appearance.Severe kyphosis   HENT:  Normocephalic, Atraumatic, Oropharynx moist mucous membranes, Poor Dentition: , Nose normal.    Eyes:  PERRLA, EOMI, Conjunctiva normal, No discharge.  Neck:  Normal range of motion, No cervical tenderness, Supple, No stridor, no JVD .  No thyromegaly.  No carotid bruit.  Cardiovascular:  Normal heart rate, Normal rhythm,  S1, S2, no S3,  S4; No gallops; No murmurs, No rubs, .   Extremitites with intact distal pulses, no cyanosis, clubbing or edema.  No heaves, thrills, HJR;  Peripheral pulses: carotid 2+, brachial 2+, radial 2+, ulnar 2+, femoral 2+, popliteal 2+, PT 2+, DP 2+;  Lungs:  Respiratory effort is normal. Normal breath " sounds, breath sounds clear to auscultation bilaterally,  no rales, no rhonchi, no wheezing.   Abdomen: Bowel sounds normal, Soft, No tenderness, No guarding, No rebound, No masses, No hepatosplenomegaly.  Skin: Warm, Dry, No erythema, No rash, no induration or crepitus.  Neurologic: Alert & oriented x 3, Normal motor function, Normal sensory function, No focal deficits noted, cranial nerves II through XII are normal, .  Psychiatric: Affect normal, Judgment normal, Mood normal.      Intake/Output Summary (Last 24 hours) at 07/19/17 0800  Last data filed at 07/18/17 1800   Gross per 24 hour   Intake  117.5 ml   Output      0 ml   Net  117.5 ml       LABS:  Lab Results   Component Value Date/Time    CHOLESTEROL, 07/12/2017 02:17 AM    LDL 42 07/12/2017 02:17 AM    HDL 52 07/12/2017 02:17 AM    TRIGLYCERIDES 77 07/12/2017 02:17 AM       Lab Results   Component Value Date/Time    WBC 5.9 07/18/2017 09:09 PM    RBC 2.71* 07/18/2017 09:09 PM    HEMOGLOBIN 8.6* 07/18/2017 09:09 PM    HEMATOCRIT 28.2* 07/18/2017 09:09 PM    .1* 07/18/2017 09:09 PM    NEUTROPHILS-POLYS 75.10* 07/17/2017 12:50 PM    LYMPHOCYTES 12.80* 07/17/2017 12:50 PM    MONOCYTES 8.50 07/17/2017 12:50 PM    EOSINOPHILS 2.80 07/17/2017 12:50 PM    BASOPHILS 0.60 07/17/2017 12:50 PM    ANISOCYTOSIS 1+ 05/16/2017 02:31 AM     Lab Results   Component Value Date/Time    SODIUM 133* 07/18/2017 09:09 PM    POTASSIUM 4.9 07/18/2017 09:09 PM    CHLORIDE 95* 07/18/2017 09:09 PM    CO2 29 07/18/2017 09:09 PM    GLUCOSE 58* 07/18/2017 09:09 PM    BUN 27* 07/18/2017 09:09 PM    CREATININE 4.37* 07/18/2017 09:09 PM         Lab Results   Component Value Date/Time    ALKALINE PHOSPHATASE 113* 07/18/2017 09:09 PM    AST(SGOT) 22 07/18/2017 09:09 PM    ALT(SGPT) 9 07/18/2017 09:09 PM    TOTAL BILIRUBIN 0.7 07/18/2017 09:09 PM      Lab Results   Component Value Date/Time    B NATRIURETIC PEPTIDE 1633* 06/17/2017 04:06 AM      No results found for: TSH  Lab  Results   Component Value Date/Time    PT 14.0 07/17/2017 12:50 PM    INR 1.05 07/17/2017 12:50 PM        Medications reviewed    Imaging reviewed    ECHO(pdg):    Impressions:  •  ESRD (end stage renal disease) on dialysis (CMS-HCC) [N18.6, Z99.2]        Priority: Medium    •  HLD (hyperlipidemia) [E78.5]        Priority: Low    •  Hyperkalemia [E87.5]    •  CN (constipation) [K59.00]    •  GIB (gastrointestinal bleeding) [K92.2]    •  HTN (hypertension) [I10]    •  Dementia [F03.90]    •  PAH (pulmonary artery hypertension) - liekly due to mitral regurgitation but with history of thromboembolic disease [I27.2]    •  History of DVT of lower extremity [Z86.718]        Elevation of Trop may be d/t ESRD  Echocardiogram ordered and will review            Recommendations:  Elevation of Trop may be d/t ESRD  Echocardiogram ordered and will review  DC UFH  DVT prophylaxis; She is stable at this time  Given options, she desires Medical txmt   ASA, ACEI/ARB, BB, +/- NTG  Case discussed with attending and RN  Will follow  Thx

## 2017-07-19 NOTE — CARE PLAN
Problem: Nutritional:  Goal: Achieve adequate nutritional intake  Patient will consume 50% of meals   Outcome: NOT MET  Pt continues with inadequate PO of meals <25% consistently. RD to continue to encourage adequate PO intake and nutrition rep to honor pt food preferences to optimize PO intake.    If intake does not improve in the next 48hrs - pt to benefit from nutrition support.

## 2017-07-19 NOTE — RESPIRATORY CARE
Respiratory Rapid Response Note    Symptoms Bradycardia     Breath Sounds  RUL Breath Sounds: Diminished   RML Breath Sounds: Diminished   RLL Breath Sounds: Diminished   MECCA Breath Sounds: Diminished   LLL Breath Sounds: Diminished            O2 (LPM):Found on 8lpm NC titrated down to 3lpm % SpO2        Transferred to ICU no

## 2017-07-19 NOTE — PROGRESS NOTES
Cardiac Summary    SR 73-57  .16/.08.32  Rare PVCs    During Rapid pt went to 2º type 1 and junctional beats  Rate went to 40s

## 2017-07-19 NOTE — PROGRESS NOTES
Rapid cleared approximately at 2130. Monitor tech called and informed pt hr in 40s. Pt unresponsive to painful stimuli. VS taken 176/71, hr 74, 100% on 3L, 18 resp, blood sugar 62. Rapid team came and ordered labs and ekg. Pt awoke after a minute and was A&Ox3. MD was paged and came bedside. Pt given food and orange juice but was refusing to drink all of orange juice. Blood sugar was retaken and was 63. Given 25 of D50. Blood sugar 103. During Rapid pt iv infiltrated. Heparin gtt was stopped for about 50 minutes until new one was placed and continued gtt at 2140.

## 2017-07-19 NOTE — PROGRESS NOTES
Received report from RN. Assumed pt care. Assessment completed. A&Ox4, confusion present. Denies pain. O2 94% on 3L.   Pt is currently in bed. Denies SOB, chest pain, dizziness.   Bed alarm on, call light within reach, pt calls appropriately and does not get out of bed. Bed in lowest position, bed locked, RN and CNA numbers provided, no further needs at this time. Safety precautions in place. Hourly rounding in progress.

## 2017-07-19 NOTE — PROGRESS NOTES
"Cardiology Progress Note               Author: Magalys Zamora Date & Time created: 2017  11:14 AM     Interval History:  HPI: 83 y/o female admitted for too weak for HD; Found to have elevated Trop; Denies CP, SOB; EKG showed; Poor historian; main c/o weakness, \"feels bad\" and SOB    Consultation: NSTEMI    2017: denies any chest pain, dialysis today   /58  HR 68  94% on 3L  I/ O: -1332      K 5.2      Review of Systems   Respiratory: Negative for cough, hemoptysis and sputum production.    Cardiovascular: Negative for chest pain, palpitations, orthopnea, claudication, leg swelling and PND.   Gastrointestinal: Positive for constipation.   Neurological: Positive for weakness.       Physical Exam   Constitutional: She appears well-developed. No distress.   Cardiovascular: Normal rate and regular rhythm.    Pulmonary/Chest: Effort normal and breath sounds normal.   Musculoskeletal: She exhibits no edema.   Neurological: She is alert.   Skin: She is not diaphoretic.       Hemodynamics:  Temp (24hrs), Av.6 °C (97.8 °F), Min:36 °C (96.8 °F), Max:36.8 °C (98.2 °F)  Temperature: 36.8 °C (98.2 °F)  Pulse  Av  Min: 65  Max: 115   Blood Pressure : 134/58 mmHg     Respiratory:    Respiration: (!) 21, Pulse Oximetry: 94 %, O2 Daily Delivery Respiratory : Silicone Nasal Cannula        RUL Breath Sounds: Diminished, RML Breath Sounds: Diminished, RLL Breath Sounds: Diminished, MECCA Breath Sounds: Diminished, LLL Breath Sounds: Diminished  Fluids:     Weight: 54.3 kg (119 lb 11.4 oz)  GI/Nutrition:  Orders Placed This Encounter   Procedures   • Diet Order     Standing Status: Standing      Number of Occurrences: 1      Standing Expiration Date:      Order Specific Question:  Diet:     Answer:  Renal [8]     Lab Results:  Recent Labs      17   0324  17   1729  17   2109   WBC  6.1  5.5  5.9   RBC  2.51*  2.50*  2.71*   HEMOGLOBIN  8.1*  7.9*  8.6*   HEMATOCRIT  26.3*  25.7*  28.2*   MCV "  104.8*  102.8*  104.1*   MCH  32.3  31.6  31.7   MCHC  30.8*  30.7*  30.5*   RDW  62.1*  59.5*  61.1*   PLATELETCT  129*  129*  141*   MPV  10.8  10.6  10.9     Recent Labs      07/18/17   0324  07/18/17   2109  07/19/17   0006   SODIUM  134*  133*  132*   POTASSIUM  4.6  4.9  5.2   CHLORIDE  96  95*  95*   CO2  33  29  29   GLUCOSE  84  58*  102*   BUN  20  27*  32*   CREATININE  3.65*  4.37*  4.76*   CALCIUM  11.0*  12.4*  12.0*     Recent Labs      07/17/17   1250  07/18/17   1729  07/19/17   0006  07/19/17   0829   APTT  27.5  36.9*  125.6*  160.1*   INR  1.05   --    --    --          Recent Labs      07/19/17   0006  07/19/17   0342  07/19/17   0829   TROPONINI  2.32*  2.53*  2.30*             Medical Decision Making, by Problem:  Active Hospital Problems    Diagnosis   • ESRD (end stage renal disease) on dialysis (CMS-HCC) [N18.6, Z99.2]   • HLD (hyperlipidemia) [E78.5]   • NSTEMI (non-ST elevated myocardial infarction) (CMS-HCC) [I21.4]   • Hyperkalemia [E87.5]   • CN (constipation) [K59.00]   • GIB (gastrointestinal bleeding) [K92.2]   • HTN (hypertension) [I10]   • Dementia [F03.90]   • PAH (pulmonary artery hypertension) - liekly due to mitral regurgitation but with history of thromboembolic disease [I27.2]   • History of DVT of lower extremity [Z86.718]       Plan:  1. NSTEMI:  - echocardiogram is pending   - on UFH and trending troponin and EKG ( troponin trending down, peaked at 2.61)  - on asa, BB, and statin     2. Mitral regurgitation:  - pending echocardiogram     Core Measures

## 2017-07-19 NOTE — PROGRESS NOTES
Critical PTT lab result was 125.6 at 0050. This value is within the defined limits of the Heparin Weight Based Protocol ordered by the physician for this patient. Heparin Weight Based Protocol will be followed for any adjustments, physician was not notified per protocol instructions.

## 2017-07-19 NOTE — PROGRESS NOTES
Resumed care of pt.  Pt is A&Ox3, periods of confusion and forgetfulness. Pt placed on bed alarm for safety, pt has non slip socks applied, bed is locked and in lowest position, and belongings are bedside and within reach. Pt educated on call light and verbalized understanding.

## 2017-07-20 VITALS
OXYGEN SATURATION: 100 % | SYSTOLIC BLOOD PRESSURE: 115 MMHG | WEIGHT: 121.25 LBS | HEART RATE: 63 BPM | HEIGHT: 62 IN | DIASTOLIC BLOOD PRESSURE: 48 MMHG | BODY MASS INDEX: 22.31 KG/M2 | RESPIRATION RATE: 16 BRPM | TEMPERATURE: 97.5 F

## 2017-07-20 LAB
ALBUMIN SERPL BCP-MCNC: 2.7 G/DL (ref 3.2–4.9)
BUN SERPL-MCNC: 18 MG/DL (ref 8–22)
CALCIUM SERPL-MCNC: 10.6 MG/DL (ref 8.5–10.5)
CHLORIDE SERPL-SCNC: 101 MMOL/L (ref 96–112)
CO2 SERPL-SCNC: 28 MMOL/L (ref 20–33)
CREAT SERPL-MCNC: 2.76 MG/DL (ref 0.5–1.4)
ERYTHROCYTE [DISTWIDTH] IN BLOOD BY AUTOMATED COUNT: 60.7 FL (ref 35.9–50)
GFR SERPL CREATININE-BSD FRML MDRD: 16 ML/MIN/1.73 M 2
GLUCOSE SERPL-MCNC: 116 MG/DL (ref 65–99)
HCT VFR BLD AUTO: 27 % (ref 37–47)
HGB BLD-MCNC: 8.1 G/DL (ref 12–16)
MCH RBC QN AUTO: 31.3 PG (ref 27–33)
MCHC RBC AUTO-ENTMCNC: 30 G/DL (ref 33.6–35)
MCV RBC AUTO: 104.2 FL (ref 81.4–97.8)
PHOSPHATE SERPL-MCNC: 3.4 MG/DL (ref 2.5–4.5)
PLATELET # BLD AUTO: 122 K/UL (ref 164–446)
PMV BLD AUTO: 11 FL (ref 9–12.9)
POTASSIUM SERPL-SCNC: 3.7 MMOL/L (ref 3.6–5.5)
RBC # BLD AUTO: 2.59 M/UL (ref 4.2–5.4)
SODIUM SERPL-SCNC: 136 MMOL/L (ref 135–145)
TROPONIN I SERPL-MCNC: 1.32 NG/ML (ref 0–0.04)
TROPONIN I SERPL-MCNC: 1.59 NG/ML (ref 0–0.04)
WBC # BLD AUTO: 6.6 K/UL (ref 4.8–10.8)

## 2017-07-20 PROCEDURE — 80069 RENAL FUNCTION PANEL: CPT

## 2017-07-20 PROCEDURE — 700102 HCHG RX REV CODE 250 W/ 637 OVERRIDE(OP): Performed by: INTERNAL MEDICINE

## 2017-07-20 PROCEDURE — 99239 HOSP IP/OBS DSCHRG MGMT >30: CPT | Performed by: INTERNAL MEDICINE

## 2017-07-20 PROCEDURE — A9270 NON-COVERED ITEM OR SERVICE: HCPCS | Performed by: INTERNAL MEDICINE

## 2017-07-20 PROCEDURE — 84484 ASSAY OF TROPONIN QUANT: CPT | Mod: 91

## 2017-07-20 PROCEDURE — 85027 COMPLETE CBC AUTOMATED: CPT

## 2017-07-20 PROCEDURE — 90670 PCV13 VACCINE IM: CPT | Performed by: INTERNAL MEDICINE

## 2017-07-20 PROCEDURE — 36415 COLL VENOUS BLD VENIPUNCTURE: CPT

## 2017-07-20 PROCEDURE — 3E0234Z INTRODUCTION OF SERUM, TOXOID AND VACCINE INTO MUSCLE, PERCUTANEOUS APPROACH: ICD-10-PCS | Performed by: INTERNAL MEDICINE

## 2017-07-20 PROCEDURE — 700111 HCHG RX REV CODE 636 W/ 250 OVERRIDE (IP): Performed by: INTERNAL MEDICINE

## 2017-07-20 PROCEDURE — 90471 IMMUNIZATION ADMIN: CPT

## 2017-07-20 RX ORDER — BISACODYL 10 MG
10 SUPPOSITORY, RECTAL RECTAL DAILY
Qty: 1 SUPPOSITORY | Refills: 0 | Status: SHIPPED | OUTPATIENT
Start: 2017-07-20

## 2017-07-20 RX ORDER — ASPIRIN 325 MG
325 TABLET, DELAYED RELEASE (ENTERIC COATED) ORAL DAILY
Qty: 40 TAB | Refills: 0 | Status: SHIPPED | OUTPATIENT
Start: 2017-07-20

## 2017-07-20 RX ADMIN — ASPIRIN 325 MG: 325 TABLET, DELAYED RELEASE ORAL at 08:58

## 2017-07-20 RX ADMIN — HYDRALAZINE HYDROCHLORIDE 10 MG: 10 TABLET, FILM COATED ORAL at 05:21

## 2017-07-20 RX ADMIN — HYDRALAZINE HYDROCHLORIDE 10 MG: 10 TABLET, FILM COATED ORAL at 14:08

## 2017-07-20 RX ADMIN — ISOSORBIDE DINITRATE 10 MG: 10 TABLET ORAL at 14:08

## 2017-07-20 RX ADMIN — ISOSORBIDE DINITRATE 10 MG: 10 TABLET ORAL at 11:31

## 2017-07-20 RX ADMIN — METOPROLOL SUCCINATE 25 MG: 25 TABLET, EXTENDED RELEASE ORAL at 08:58

## 2017-07-20 RX ADMIN — LOSARTAN POTASSIUM 25 MG: 25 TABLET, FILM COATED ORAL at 08:58

## 2017-07-20 RX ADMIN — BISACODYL 10 MG: 10 SUPPOSITORY RECTAL at 08:58

## 2017-07-20 RX ADMIN — HEPARIN SODIUM 5000 UNITS: 5000 INJECTION, SOLUTION INTRAVENOUS; SUBCUTANEOUS at 05:21

## 2017-07-20 RX ADMIN — PNEUMOCOCCAL 13-VALENT CONJUGATE VACCINE 0.5 ML: 2.2; 2.2; 2.2; 2.2; 2.2; 4.4; 2.2; 2.2; 2.2; 2.2; 2.2; 2.2; 2.2 INJECTION, SUSPENSION INTRAMUSCULAR at 15:25

## 2017-07-20 RX ADMIN — Medication 325 MG: at 08:58

## 2017-07-20 RX ADMIN — OMEPRAZOLE 20 MG: 20 CAPSULE, DELAYED RELEASE ORAL at 05:21

## 2017-07-20 RX ADMIN — RENAGEL 800 MG: 400 TABLET ORAL at 09:00

## 2017-07-20 RX ADMIN — HEPARIN SODIUM 5000 UNITS: 5000 INJECTION, SOLUTION INTRAVENOUS; SUBCUTANEOUS at 14:08

## 2017-07-20 RX ADMIN — STANDARDIZED SENNA CONCENTRATE AND DOCUSATE SODIUM 2 TABLET: 8.6; 5 TABLET, FILM COATED ORAL at 08:58

## 2017-07-20 ASSESSMENT — PAIN SCALES - GENERAL
PAINLEVEL_OUTOF10: 0

## 2017-07-20 ASSESSMENT — ENCOUNTER SYMPTOMS
SHORTNESS OF BREATH: 0
WEAKNESS: 1

## 2017-07-20 ASSESSMENT — LIFESTYLE VARIABLES: DO YOU DRINK ALCOHOL: NO

## 2017-07-20 NOTE — PROGRESS NOTES
Pt d/c with medical transportation, no signs of distress at time of d/c, pt going to [facility] facility, sent with belongings, denies needs.

## 2017-07-20 NOTE — DISCHARGE PLANNING
Received PCS form from JAREK Flores to [facility]. Contacted [facility] spoke to Lidia they can accept patient today. Transportation arranged with Kelechi at [facility] to [facility] at 1530. Notified JAREK Zuniga via phone.

## 2017-07-20 NOTE — PROGRESS NOTES
IV removed, cardiac monitor removed, pt prepped for d/c to [facility]. Pt to be picked up at 1530, pt aware of POC.

## 2017-07-20 NOTE — CARE PLAN
Problem: Infection  Goal: Will remain free from infection  Outcome: PROGRESSING AS EXPECTED  Intervention: Assess signs and symptoms of infection  Assess signs and symptoms of infection      Problem: Mobility  Goal: Risk for activity intolerance will decrease  Outcome: PROGRESSING SLOWER THAN EXPECTED  Intervention: Assess and monitor signs of activity intolerance                        07/19/17 1954   OTHER   Assistance / Tolerance General Weakness;Tires quickly;Does Not Tolerate

## 2017-07-20 NOTE — DISCHARGE PLANNING
Attempted to meet with pt to discuss d/c plan and transitional care. Pt currently resting soundly. Will return at a later time.

## 2017-07-20 NOTE — PROGRESS NOTES
"Pt back on unit. Report received from Dialysis RN. Vitals obtained. /50 mmHg  Pulse 68  Temp(Src) 36.7 °C (98.1 °F)  Resp 17  Ht 1.575 m (5' 2\")  Wt 54.3 kg (119 lb 11.4 oz)  BMI 21.89 kg/m2  SpO2 100%  LMP 05/12/1970  Breastfeeding? No   No complaints of chest pain, SOB or pain. Pt calm and resting.    "

## 2017-07-20 NOTE — DISCHARGE PLANNING
Medical Social Work    Updated pt's brother, bedside RN, attending MD of transfer at 3:30pm via [facility]. Also updated Thalia with dialysis.

## 2017-07-20 NOTE — DISCHARGE PLANNING
Received [facility] auth for transport [id number]. Notified at [facility] with [facility].

## 2017-07-20 NOTE — PROGRESS NOTES
Pt dialyzed for 3 hrs today from 1301 to 1601.  Pt tolerated tx well. Net UF 1.0L. 10 mins to achieve hemostasis and dsgs applied to sites .  See paper dialysis flow sheet for details.  Report called to Madai Muñiz RN.

## 2017-07-20 NOTE — PROGRESS NOTES
Report called to nursing at [facility] nursing [facility].

## 2017-07-20 NOTE — DISCHARGE SUMMARY
HOSPITAL MEDICINE DISCHARGE SUMMARY    Name: Skye James  MRN: [id number]  : 1935  Admit Date: 2017  Discharge Date: 2017  Attending Provider: Ryan Soriano M.D.  Primary Care Physician: Alistair Gandara M.D.    CHIEF COMPLAINT  Chief Complaint   Patient presents with   • Weakness     Pt sent from [facility] for generalized weakness and felt that pt was took weak to recieve dialysis.  Pt is poor historian but per report pt has been getting increasingly weak over the past month or so.  Pt has kyphosis of her back and is slumped forward upon arrival.     • Rectal Pain     Pt reports rectal pain.  Pt is unsure of LBM.        CODE STATUS  Full Code    DISCHARGE DIAGNOSES WITH THEIR RESPECTIVE HOSPITAL COURSE, PLAN AND FOLLOW-UP  Please review Dr. Jonas Liang M.D. notes for further details of history of present illness, past medical/social/family histories, allergies and medications. Please review Dr. Moses consultation notes.    ESRD (end stage renal disease) on dialysis (CMS-[facility]) (present on admission)  Assessment & Plan  Sevelamer, dialysis, nephrology following  Stable.  Follow up attending physician at [facility] upon receipt. Defer referral to Pulmonology.   Follow up with Alistair Gandara M.D. In 1-2 weeks  Follow up Nephrology Dr. Moses in 10-2 weeks for dialysis.    History of DVT of lower extremity (present on admission)  Assessment & Plan  Coumadin stopped due to GI bleed, discussed with family, she is likely no longer a good coumadin candidate. Discussed by the hospitalist and intensivists prior to my care.  Follow up attending physician at [facility] upon receipt. Defer referral to Pulmonology.   Follow up with Alistair Gandara M.D. In 1-2 weeks    PAH (pulmonary artery hypertension) - liekly due to mitral regurgitation but with history of thromboembolic disease (present on admission)  Assessment & Plan  Watch volume, RT/O2  Dialysis to help with volume  management  Follow up attending physician at [facility] upon receipt. Defer referral to Pulmonology.   Follow up with Alistair Gandara M.D. In 1-2 weeks  Follow up with Dr. Moses or nephrologust at Nephrology clinic for dialysis in 1-2 weeks.    Dementia (present on admission)  Assessment & Plan  Family support    HTN (hypertension) (present on admission)  Assessment & Plan  Coreg, losartan, hydralazine, imdur  Follow up attending physician at [facility] upon receipt. Defer referral to Pulmonology.   Follow up with Alistair Gandara M.D. In 1-2 weeks    GIB (gastrointestinal bleeding) (present on admission)  Assessment & Plan  PPI, off AC    Hyperkalemia  Assessment & Plan  Dialysis today, discussed with Dr. Marquez    CN (constipation)  Assessment & Plan  Enema pending    NSTEMI (non-ST elevated myocardial infarction) (CMS-HCC)  Assessment & Plan  Elevating troponin on 7/18. I called and consulted Dr. Napier, Cardiology. Started heparin gtt. Already on isordil, BB. Cannot do ACEI because of renal failure. Consider nitro gtt if hypertension resistant, or symptomatic (currently not sympromatic). Echo ordered and reviewed by Dr. Napier, normal EF. Dr. Napier recommended medical management.  Follow up with Dr. Napier, Cardiology in 1-2 weeks.    Hypoglycemia  Assessment & Plan  From poor PO intake, no history of diabetes. Resolved; encouraged PO    HLD (hyperlipidemia) (present on admission)  Assessment & Plan  Statin, ?beneficial in dialysis pt?      DISCHARGE PHYSICAL EXAM      Physical Exam  General/Constitutional: No acute distress. Frail  Head: Normocephalic, atraumatic  ENT: Oral mucosa is moist. No obvious pharyngeal exudates  Eyes: Pink conjunctiva, no scleral icterus  Neck: Supple, no lymphadenopathy  Cardiovascular: Normal rate and regular rhythm. S1,2 noted. No murmurs, gallops or rubs.  Pulmonary: Clear to auscultation bilaterally. No wheezes, rales or rhonchi.  Abdominal: Soft, nontender, not  distended, bowel sounds normoactive. No guarding or peritoneal signs.  Musculoskeletal: No tenderness to palpation of chest wall.  Neurologic: Alert and oriented. Grossly nonfocal, moving all extremities.  Genitourinary: No gross hematuria  Skin: No obvious rash. Dialysis cath clean dry and intact  Psychiatric: Pleasant, cooperative.  Vitals Reviewed  Labs Reviewed  Imaging reviewed  Nursing notes reviewed    Skye James improved and was deemed ready to be discharged from the hospital as there were no further inpatient needs. Skye James felt comfortable going [facility]. The discharge plan was discussed with Skye Kimmy Pateson, and agreed to it. Skye James was subsequently discharged in improved and stable condition.    DISCHARGE MEDICATIONS:    Skye James   Home Medication Instructions MYRIAM:[id number]    Printed on:07/20/17 6642   Medication Information                      acetaminophen (TYLENOL) 325 MG Tab  Take 650 mg by mouth every 6 hours as needed.             aspirin  MG Tablet Delayed Response  Take 1 Tab by mouth every day.             bisacodyl (DULCOLAX) 10 MG Suppos  Insert 1 Suppository in rectum every day.             Cinacalcet HCl (SENSIPAR) 90 MG Tab  Take 1 Tab by mouth every day.             ferrous sulfate (IRON SUPPLEMENT) 325 (65 FE) MG tablet  Take 325 mg by mouth 2 Times a Day.             hydrALAZINE (APRESOLINE) 10 MG Tab  Take 1 Tab by mouth every 8 hours.             isosorbide dinitrate (ISORDIL) 10 MG Tab  Take 1 Tab by mouth 3 times a day.             Lactobacillus Rhamnosus, GG, (CULTURELLE PO)  Take 1 Tab by mouth 3 times a day. Chewable 1 million cell             losartan (COZAAR) 25 MG Tab  Take 25 mg by mouth every day.             metoprolol SR (TOPROL XL) 25 MG TABLET SR 24 HR  Take 25 mg by mouth every day.             omeprazole (PRILOSEC) 20 MG delayed-release capsule  Take 1 Cap by mouth 2 times daily, before breakfast and  dinner.             ondansetron (ZOFRAN ODT) 4 MG TABLET DISPERSIBLE  Take 4 mg by mouth every 6 hours as needed for Nausea/Vomiting.             Sevelamer Carbonate 800 MG Tab  Take 1 Tab by mouth 2 Times a Day.             simvastatin (ZOCOR) 40 MG Tab  Take 40 mg by mouth every evening.               DISCHARGE VITALS, LABS and IMAGING  Filed Vitals:    07/20/17 0000 07/20/17 0400 07/20/17 0800 07/20/17 1200   BP: 124/50 135/54 154/65 115/48   Pulse: 67 68 67 63   Temp: 36.7 °C (98.1 °F) 36.8 °C (98.3 °F) 36.6 °C (97.9 °F) 36.4 °C (97.5 °F)   Resp: 15 16 16 16   Height:       Weight:       SpO2: 98% 98% 100% 100%     Lab Results   Component Value Date/Time    WBC 6.6 07/20/2017 01:59 AM    RBC 2.59* 07/20/2017 01:59 AM    HEMOGLOBIN 8.1* 07/20/2017 01:59 AM    HEMATOCRIT 27.0* 07/20/2017 01:59 AM    .2* 07/20/2017 01:59 AM    MCH 31.3 07/20/2017 01:59 AM    MCHC 30.0* 07/20/2017 01:59 AM    MPV 11.0 07/20/2017 01:59 AM    NEUTROPHILS-POLYS 75.10* 07/17/2017 12:50 PM    LYMPHOCYTES 12.80* 07/17/2017 12:50 PM    MONOCYTES 8.50 07/17/2017 12:50 PM    EOSINOPHILS 2.80 07/17/2017 12:50 PM    BASOPHILS 0.60 07/17/2017 12:50 PM    ANISOCYTOSIS 1+ 05/16/2017 02:31 AM      Lab Results   Component Value Date/Time    SODIUM 136 07/20/2017 12:26 AM    POTASSIUM 3.7 07/20/2017 12:26 AM    CHLORIDE 101 07/20/2017 12:26 AM    CO2 28 07/20/2017 12:26 AM    GLUCOSE 116* 07/20/2017 12:26 AM    BUN 18 07/20/2017 12:26 AM    CREATININE 2.76* 07/20/2017 12:26 AM      Lab Results   Component Value Date/Time    PT 14.0 07/17/2017 12:50 PM    INR 1.05 07/17/2017 12:50 PM        Ct-abdomen-pelvis W/o    7/17/2017 7/17/2017 12:51 PM HISTORY/REASON FOR EXAM:  Rectal pain and abnormal bowel movements. TECHNIQUE/EXAM DESCRIPTION: CT scan of the abdomen and pelvis without contrast. Noncontrast helical scanning was obtained from the diaphragmatic domes through the pubic symphysis. Low dose optimization technique was utilized for this CT  exam including automated exposure control and adjustment of the mA and/or kV according to patient size. COMPARISON: 03/21/2017 FINDINGS: CT Abdomen: Bilateral pleural effusions are identified. Opacification and consolidation is noted posteriorly in each lung base as well as posteriorly in the right middle lobe. There is extensive calcific atherosclerosis of the aorta and its branch vessels including the renal arteries. Findings are unchanged compared to the prior exam. No focal solid organ abnormalities have developed in the liver spleen pancreas kidneys or adrenal glands on this noncontrast CT. Cardiomegaly is noted. No free fluid is identified. CT Pelvis: Ventral abdominal wall hernia containing only peritoneal fat is identified located slightly to the left of midline. No bowel loops are noted in this region. No dilated loops of bowel are identified. Rectum is somewhat distended with stool and air.     7/17/2017  1.  Some degree of fecal impaction may be present although the more proximal colon does not appear to be significantly dilated. 2.  Cardiomegaly is noted. 3.  Extensive calcific atherosclerosis again noted. 4.  Bilateral pulmonary consolidations are identified. There are also bilateral pleural effusions. Aspiration or pneumonia are possibilities. 5.  Small anterior abdominal wall hernia is noted containing only peritoneal fat at this time. No dilated loops of bowel are identified.    Dx-chest-limited (1 View)    7/5/2017 7/5/2017 5:33 PM HISTORY/REASON FOR EXAM: Shortness of breath TECHNIQUE/EXAM DESCRIPTION AND NUMBER OF VIEWS: Single AP view of the chest. COMPARISON: 5/13/2017 FINDINGS: The lungs are hypoventilated with bibasilar atelectasis. There is mild interstitial edema. The heart is enlarged. There are small bilateral pleural effusions. There is degenerative change of the left shoulder.     7/5/2017  1.  Hypoventilated lungs. 2.  Cardiomegaly with interstitial edema. 3.  Bibasilar atelectasis and  small pleural effusions.    Dx-chest-portable (1 View)    7/17/2017 7/17/2017 12:41 PM HISTORY/REASON FOR EXAM:  Weakness. TECHNIQUE/EXAM DESCRIPTION AND NUMBER OF VIEWS: Single portable view of the chest. COMPARISON: 7/5/2017 FINDINGS: Patient is rotated and significantly kyphotic, limiting evaluation. The heart is enlarged. Atherosclerotic calcification is seen. Bibasilar opacities may represent atelectasis or consolidation. There are likely small bilateral pleural effusions. No pneumothorax is identified. Mild interstitial prominence may represent mild edema.     7/17/2017  Bibasilar opacities may represent atelectasis or consolidation. Small bilateral pleural effusions are likely present. Cardiomegaly. Atherosclerotic plaque. Mild interstitial prominence may represent mild edema.    Dx-knee 2- Left    7/1/2017 7/1/2017 4:05 PM HISTORY/REASON FOR EXAM: Left knee pain and swelling TECHNIQUE/EXAM DESCRIPTION AND NUMBER OF VIEWS: 2 views of the LEFT knee. COMPARISON: None FINDINGS: Bone density is osteopenic There is no evidence of fracture or dislocation. There is severe tricompartment degenerative change characterized by large osteophytes with extensive joint space loss and subchondral cystic change. There is a moderate sized joint effusion. There is vascular calcification.     7/1/2017  1.  Severe tricompartment degenerative change. 2.  Joint effusion. 3.  No evidence of fracture.    Dx-knee Complete 4+ Left    7/11/2017 7/11/2017 12:00 PM HISTORY/REASON FOR EXAM:  Atraumatic Pain/Swelling/Deformity. TECHNIQUE/EXAM DESCRIPTION AND NUMBER OF VIEWS:  2 views of the LEFT knee. COMPARISON: None FINDINGS: Marked osteopenia. Valgus angulation of the left knee. Severe left knee joint arthritis. No acute fracture identified. Possible knee joint effusion. Arterial calcifications.     7/11/2017  Marked osteopenia and severe arthritic changes left knee. No acute fracture identified. Possible knee joint  effusion.    Echocardiogram Comp W/o Cont    2017  Transthoracic Echo Report Echocardiography Laboratory CONCLUSIONS Compared to the images of the prior study done on 16, there has been no significant change. Normal left ventricular systolic function. Moderate mitral regurgitation. Moderate tricuspid regurgitation. Estimated right ventricular systolic pressure  is 75 mmHg. Severely dilated left atrium. Left atrial volume index is  64 mL/sq m. FRANCISCO MARK Exam Date:         2017                    11:21 Exam Location:     Inpatient Priority:          Routine Ordering Physician:        LEELEE ALVAREZ Referring Physician:       [id number] Reeder Sonographer:               Cande Richards RDCS Age:    82     Gender:    F MRN:    [id number] :    1935 BSA:    1.53   Ht (in):    62     Wt (lb):    119 Exam Type:     Complete Indications:     Kidney disease ICD Codes:       N181 CPT Codes:       87263 BP:   130    /   69     HR:   74 Technical Quality:       Fair MEASUREMENTS  (Male / Female) Normal Values 2D ECHO LV Diastolic Diameter PLAX        4.6 cm                4.2 - 5.9 / 3.9 - 5.3 cm LV Systolic Diameter PLAX         2 cm                  2.1 - 4.0 cm IVS Diastolic Thickness           1.1 cm                LVPW Diastolic Thickness          0.93 cm               LVOT Diameter                     1.8 cm                Estimated LV Ejection Fraction    60 %                  LV Ejection Fraction MOD BP       72.2 %                >= 55  % LV Ejection Fraction MOD 4C       70 %                  LV Ejection Fraction MOD 2C       75.4 %                IVC Diameter                      1.3 cm                M-MODE Aortic Root Diameter MM           3.4 cm                DOPPLER AV Peak Velocity                  1.8 m/s               AV Peak Gradient                  12.8 mmHg             AV Mean Gradient                  7.7 mmHg              AI Pressure Half Time             444 ms                 LVOT Peak Velocity                1.1 m/s               AV Area Cont Eq vti               1.6 cm²               Mitral E Point Velocity           1 m/s                 Mitral E to A Ratio               1.6                   MV Pressure Half Time             56.8 ms               MV Area PHT                       3.9 cm²               MV Deceleration Time              196 ms                MR ERO PISA                       0.16 cm²              MR Regurgitant Volume PISA        32.7 cm³              Pulmonary Vein Systolic Velocity  0.83 m/s              Pulmonary Vein Diastolic Velocit  0.49 m/s              Pulmonary Vein S/D Ratio          1.7                   TV Peak E Velocity                0.6 m/s               TR Peak Velocity                  412 cm/s              PV Peak Velocity                  1.4 m/s               PV Peak Gradient                  8.2 mmHg              RVOT Peak Velocity                1 m/s                 * Indicates values subject to auto-interpretation LV EF:  60    % FINDINGS Left Ventricle Normal left ventricular size and systolic function. Normal left ventricular wall thickness. Left ventricular ejection fraction is visually estimated to be 60%. Normal regional wall motion. Right Ventricle Mildly dilated right ventricle. Normal right ventricular systolic function. Right Atrium Enlarged right atrium. Normal inferior vena cava size and inspiratory collapse. Left Atrium Severely dilated left atrium. Left atrial volume index is  64 mL/sq m. Mitral Valve Thickened mitral valve leaflets. No mitral stenosis. Moderate mitral regurgitation. Aortic Valve Aortic sclerosis without stenosis. Trace aortic insufficiency. Tricuspid Valve No tricuspid stenosis. Moderate tricuspid regurgitation. Estimated right ventricular systolic pressure  is 75 mmHg. Pulmonic Valve Structurally normal pulmonic valve. Mild pulmonic insufficiency. Pericardium Normal pericardium without effusion. Aorta The aortic  root is normal. Rafi JALEESA To (Electronically Signed) Final Date:     2017                 14:22    Le Venous Duplex - Dvt ([facility]ional [facility] Only)    2017   Vascular Laboratory  CONCLUSIONS  Compare to prior ultrasound on 4/6/15.  Normal bilateral superficial and deep venous examination of the lower  extremities.  FRANCISCO MARK  Exam Date:     2017 15:45  Room #:     Inpatient  Priority:     Stat  Ht (in):             Wt (lb):  Ordering Physician:        CARLA VARGAS  Referring Physician:       [id number] Barraza  Sonographer:               Ankita Beckman RVT  Study Type:                Complete Bilateral  Technical Quality:         Adequate  Age:    82    Gender:     F  MRN:    [id number]  :    1935      BSA:  Indications:     DVT LE-Distal Leg  CPT Codes:       28351  ICD Codes:       453.42  History:         History of prior left calf DVT, per patient  Limitations:  PROCEDURES:  Bilateral lower extremity venous duplex imaging.  The following venous structures were evaluated: common femoral, profunda  femoral, greater saphenous, femoral, popliteal , peroneal and posterior  tibial veins.  Serial compression, augmentation maneuvers,  color and spectral Doppler  flow evaluations were performed.  FINDINGS:  Bilateral lower extremities -.  Complete color filling and compressibility with normal venous flow dynamics  including spontaneous flow, response to augmentation maneuvers, and  respiratory phasicity.  No superficial or deep venous thrombosis.  Loco Reyes M.D.  (Electronically Signed)  Final Date:      2017                   18:00      Please see discharge diagnoses, plan and follow up above for details of pending tests and postdischarge instructions for the clinic providers and specialists.    Please CC Alistair Gandara M.D., Dr. Moses, Nephrolgoy, Attending Physician at [facility]    For further details on discharge  medications, patient education, diet, and activity, please refer to electronic copy of discharge instructions.       TIME SPENT: 50 minutes, with greater than 50% of the time spent on face-to-face encounter, addressing medical issues, coordination of care, counseling, discharge planning, medication reconciliation, and documentation.

## 2017-07-20 NOTE — PROGRESS NOTES
Hospital Medicine Progress Note, Adult, Complex               Author: Nakul Marquez Date & Time created: 7/20/2017  2:02 PM     Interval History:  82-year-old with end-stage renal disease who is admitted for failure to thrive, unable to get to dialysis due to weakness.     Slightly improved, Ca coming down after pamidronate    Review of Systems:  Review of Systems   Constitutional: Positive for malaise/fatigue.   Respiratory: Negative for shortness of breath.    Cardiovascular: Negative for chest pain.   Neurological: Positive for weakness.       Physical Exam:  Physical Exam   Constitutional: She appears cachectic. She appears ill.   Cardiovascular: Normal rate and regular rhythm.    Pulmonary/Chest: Effort normal and breath sounds normal.   Abdominal: Soft. Bowel sounds are normal.   Musculoskeletal: She exhibits no edema or tenderness.   Skin: Skin is warm and dry.       Labs:        Invalid input(s): [id number]  Recent Labs      07/19/17   0829  07/19/17   1155  07/20/17   0348   TROPONINI  2.30*  2.18*  1.59*     Recent Labs      07/18/17   0324  07/18/17 2109 07/19/17   0006  07/20/17   0026   SODIUM  134*  133*  132*  136   POTASSIUM  4.6  4.9  5.2  3.7   CHLORIDE  96  95*  95*  101   CO2  33  29  29  28   BUN  20  27*  32*  18   CREATININE  3.65*  4.37*  4.76*  2.76*   MAGNESIUM  2.4   --    --    --    PHOSPHORUS   --    --   5.4*  3.4   CALCIUM  11.0*  12.4*  12.0*  10.6*     Recent Labs      07/18/17   0324  07/18/17 2109 07/19/17   0006  07/20/17   0026   ALTSGPT  10  9   --    --    ASTSGOT  19  22   --    --    ALKPHOSPHAT  103*  113*   --    --    TBILIRUBIN  0.6  0.7   --    --    GLUCOSE  84  58*  102*  116*     Recent Labs      07/18/17   1729  07/18/17   2109  07/19/17   0006  07/19/17   0829  07/20/17   0159   RBC  2.50*  2.71*   --    --   2.59*   HEMOGLOBIN  7.9*  8.6*   --    --   8.1*   HEMATOCRIT  25.7*  28.2*   --    --   27.0*   PLATELETCT  129*  141*   --    --   122*   APTT   36.9*   --   125.6*  160.1*   --      Recent Labs      17   0324  17   1729  17   2109  17   0159   WBC  6.1  5.5  5.9  6.6   ASTSGOT  19   --   22   --    ALTSGPT  10   --   9   --    ALKPHOSPHAT  103*   --   113*   --    TBILIRUBIN  0.6   --   0.7   --            Hemodynamics:  Temp (24hrs), Av.7 °C (98 °F), Min:36.4 °C (97.5 °F), Max:36.8 °C (98.3 °F)  Temperature: 36.4 °C (97.5 °F)  Pulse  Av.2  Min: 63  Max: 115   Blood Pressure : 115/48 mmHg     Respiratory:    Respiration: 16, Pulse Oximetry: 100 %        RUL Breath Sounds: Diminished, RML Breath Sounds: Diminished, RLL Breath Sounds: Diminished, MECCA Breath Sounds: Diminished, LLL Breath Sounds: Diminished  Fluids:    Intake/Output Summary (Last 24 hours) at 17 1402  Last data filed at 17 1900   Gross per 24 hour   Intake   1000 ml   Output   1500 ml   Net   -500 ml     Weight: 55 kg (121 lb 4.1 oz)  GI/Nutrition:  Orders Placed This Encounter   Procedures   • Diet Order     Standing Status: Standing      Number of Occurrences: 1      Standing Expiration Date:      Order Specific Question:  Diet:     Answer:  Renal [8]   • DISCONTINUE DIET TRAY     Standing Status: Standing      Number of Occurrences: 1      Standing Expiration Date:      Medical Decision Making, by Problem:  Active Hospital Problems    Diagnosis   • ESRD (end stage renal disease) on dialysis (CMS-HCC) [N18.6, Z99.2]   • HLD (hyperlipidemia) [E78.5]   • NSTEMI (non-ST elevated myocardial infarction) (CMS-HCC) [I21.4]   • Hyperkalemia [E87.5]   • CN (constipation) [K59.00]   • GIB (gastrointestinal bleeding) [K92.2]   • HTN (hypertension) [I10]   • Dementia [F03.90]   • PAH (pulmonary artery hypertension) - liekly due to mitral regurgitation but with history of thromboembolic disease [I27.2]   • History of DVT of lower extremity [Z86.718]       1. End-stage renal disease, on dialysis 3 times a week. Slightly improved  2. Failure to thrive,  progressive  3. Hypercalcemia, possibly due to immobility, symptomatically  4. History of GI bleed  5. Dementia, advanced      -Discharge back to SNF  -Continue EOL discussion    Core Measures

## 2017-07-20 NOTE — DISCHARGE PLANNING
Transitional care update:    Met with pt at bedside to discuss d/c plan. Pt is agreeable to SNF placement and has chosen [facility]. Choice form completed and faxed to [facility]. IMM letter reviewed and verbally acknowledged. Copy given to pt.

## 2017-07-20 NOTE — PROGRESS NOTES
Resumed care of pt.  Pt is A&Ox2. Pt has hx of of dementia. Family at bedside. Pt placed on bed alarm for safety, pt has non slip socks applied, bed is locked and in lowest position, and belongings are bedside and within reach.

## 2017-07-20 NOTE — DISCHARGE INSTRUCTIONS
Discharge Instructions    Discharged to other by medical transportation with escort. Discharged via ambulance, hospital escort: Yes.  Special equipment needed: Oxygen    Be sure to schedule a follow-up appointment with your primary care doctor or any specialists as instructed.     Discharge Plan:   Diet Plan: Discussed  Activity Level: Discussed  Confirmed Follow up Appointment: Appointment Scheduled  Confirmed Symptoms Management: Discussed  Medication Reconciliation Updated: Yes  Influenza Vaccine Indication: Patient Refuses    I understand that a diet low in cholesterol, fat, and sodium is recommended for good health. Unless I have been given specific instructions below for another diet, I accept this instruction as my diet prescription.   Other diet: renal diet    Special Instructions:   HF Patient Discharge Instructions  · Monitor your weight daily, and maintain a weight chart, to track your weight changes.   · Activity as tolerated, unless your Doctor has ordered otherwise. Other activity order: activity as tolerated.  · Follow a low fat, low cholesterol, low salt diet unless instructed otherwise by your Doctor. Read the labels on the back of food products and track your intake of fat, cholesterol and salt.   · Fluid Restriction No. If a Fluid Restriction has been ordered by your Doctor, measure fluids with a measuring cup to ensure that you are not exceeding the restriction.   · No smoking.  · Oxygen Yes. If your Doctor has ordered that you wear Oxygen at home, it is important to wear it as ordered.  · Did you receive an explanation from staff on the importance of taking each of your medications and why it is necessary to keep taking them unless your doctor says to stop? Yes  · Were all of your questions answered about how to manage your heart failure and what to do if you have increased signs and symptoms after you go home? Yes  · Do you feel like your heart failure care team involved you in the care  treatment plan and allowed you to make decisions regarding your care while in the hospital and addressed any discharge needs you might have? Yes    See the educational handout provided at discharge for more information on monitoring your daily weight, activity and diet. This also explains more about Heart Failure, symptoms of a flare-up and some of the tests that you have undergone.     Warning Signs of a Flare-Up include:  · Swelling in the ankles or lower legs.  · Shortness of breath, while at rest, or while doing normal activities.   · Shortness of breath at night when in bed, or coughing in bed.   · Requiring more pillows to sleep at night, or needing to sit up at night to sleep.  · Feeling weak, dizzy or fatigued.     When to call your Doctor:  · Call [employer] seven days a week from 8:00 a.m. to 8:00 p.m. for medical questions [phone].  · Call your Primary Care Physician or Cardiologist if:   · You experience any pain radiating to your jaw or neck.  · You have any difficulty breathing.  · You experience weight gain of 3 lbs in a day or 5 lbs in a week.   · You feel any palpitations or irregular heartbeats.  · You become dizzy or lose consciousness.   If you have had an angiogram or had a pacemaker or AICD placed, and experience:  · Bleeding, drainage or swelling at the surgical / puncture site.  · Fever greater than 100.0 F  · Shock from internal defibrillator.  · Cool and / or numb extremities.      · Is patient discharged on Warfarin / Coumadin?   No     · Is patient Post Blood Transfusion?  No    Hyperkalemia  Hyperkalemia is when you have too much potassium in your blood. Potassium is normally removed (excreted) from your body by your kidneys. If there is too much potassium in your blood, it can affect your heart's ability to function.   CAUSES   Hyperkalemia may be caused by:   · Taking in too much potassium. You can do this by:  ¨ Using salt substitutes. They contain large amounts of  potassium.  ¨ Taking potassium supplements.  ¨ Eating foods high in potassium.  · Excreting too little potassium. This can happen if:  ¨ Your kidneys are not working properly. Kidney (renal) disease, including short- or long-term renal failure, is a very common cause of hyperkalemia.  ¨ You are taking medicines that lower your excretion of potassium.  ¨ You have Sioux disease.  ¨ You have a urinary tract blockage, such as kidney stones.  ¨ You are on treatment to mechanically clean your blood (dialysis) and you skip a treatment.  · Releasing a high amount of potassium from your cells into your blood. This can happen with:  ¨ Injury to muscles (rhabdomyolysis) or other tissues. Most potassium is stored in your muscles.  ¨ Severe burns or infections.  ¨ Acidic blood plasma (acidosis). Acidosis can result from many diseases, such as uncontrolled diabetes.  RISK FACTORS  The most common risk factor of hyperkalemia is kidney disease. Other risk factors of hyperkalemia include:  · Sioux disease. This is a condition where your glands do not produce enough hormones.  · Alcoholism or heavy drug use.    · Using certain blood pressure medicines, such as angiotensin-converting enzyme (ACE) inhibitors, angiotensin II receptor blockers (ARBs), or potassium-sparing diuretics such as spironolactone.  · Severe injury or burn.  SIGNS AND SYMPTOMS   Oftentimes, there are no signs or symptoms of hyperkalemia.  However, when your potassium level becomes high enough, you may experience symptoms such as:  · Irregular or very slow heartbeat.  · Nausea.  · Fatigue.  · Tingling of the skin or numbness of the hands or feet.  · Muscle weakness.  · Fatigue.  · Not being able to move (paralysis).  You may not have any symptoms of hyperkalemia.   DIAGNOSIS   Hyperkalemia may be diagnosed by:  · Physical exam.  · Blood tests.  · ECG (electrocardiogram).  · Discussion of prescription and non-prescription drug use.  TREATMENT   Treatment for  hyperkalemia is often directed at the underlying cause. In some instances, treatment may include:   · Insulin.  · Glucose (sugar) and water solution given through a vein (intravenous or IV ).  · Dialysis.  · Medicines to remove the potassium from your body.  · Medicines to move calcium from your bloodstream into your tissues.  HOME CARE INSTRUCTIONS   · Take medicines only as directed by your health care provider.  · Do not take any supplements, natural products, herbs, or vitamins without reviewing them with your health care provider. Certain supplements and natural food products can have high amounts of potassium.  · Limit your alcohol intake as directed by your health care provider.  · Stop illegal drug use. If you need help quitting, ask your health care provider.  · Keep all follow-up visits as directed by your health care provider. This is important.  · If you have kidney disease, you may need to follow a low potassium diet. A dietitian can help educate you on low potassium foods.  SEEK MEDICAL CARE IF:   · You notice an irregular or very slow heartbeat.  · You feel light-headed.  · You feel weak.  · You are nauseous.  · You have tingling or numbness in your hands or feet.  SEEK IMMEDIATE MEDICAL CARE IF:   · You have shortness of breath.  · You have chest pain or discomfort.  · You pass out.  · You have muscle paralysis.  MAKE SURE YOU:   · Understand these instructions.  · Will watch your condition.  · Will get help right away if you are not doing well or get worse.     This information is not intended to replace advice given to you by your health care provider. Make sure you discuss any questions you have with your health care provider.     Document Released: 12/08/2003 Document Revised: 01/08/2016 Document Reviewed: 03/25/2015  [employer] Interactive Patient Education ©2016 [employer]      Depression / Suicide Risk    As you are discharged from this [facility] facility, it is important to learn how to  keep safe from harming yourself.    Recognize the warning signs:  · Abrupt changes in personality, positive or negative- including increase in energy   · Giving away possessions  · Change in eating patterns- significant weight changes-  positive or negative  · Change in sleeping patterns- unable to sleep or sleeping all the time   · Unwillingness or inability to communicate  · Depression  · Unusual sadness, discouragement and loneliness  · Talk of wanting to die  · Neglect of personal appearance   · Rebelliousness- reckless behavior  · Withdrawal from people/activities they love  · Confusion- inability to concentrate     If you or a loved one observes any of these behaviors or has concerns about self-harm, here's what you can do:  · Talk about it- your feelings and reasons for harming yourself  · Remove any means that you might use to hurt yourself (examples: pills, rope, extension cords, firearm)  · Get professional help from the community (Mental Health, Substance Abuse, psychological counseling)  · Do not be alone:Call your Safe Contact- someone whom you trust who will be there for you.  · Call your local CRISIS HOTLINE 051-8029 or 104-361-2890  · Call your local Children's Mobile Crisis Response Team [place] (260) 269-8823 or www.[employer]  · Call the toll free National Suicide Prevention Hotlines   · National Suicide Prevention Lifeline 239-394-BMGQ (2952)  · National Hope Line Network 800-SUICIDE (621-9719)

## 2017-07-20 NOTE — PROGRESS NOTES
[facility] Hospitalist Progress Note    Date of Service: 2017    Chief Complaint  82 y.o. female admitted 2017 with Weakness and Rectal Pain    Interval Problem Update  Mentation better. But still weak. Encouraged PO.    Consultants/Specialty  Nephrology  Cardiology, Dr. Karthikeyan Squires skilled planned.        Review of Systems   Unable to perform ROS: acuity of condition      Physical Exam  Laboratory/Imaging   Hemodynamics  Temp (24hrs), Av.6 °C (97.9 °F), Min:36 °C (96.8 °F), Max:37.2 °C (98.9 °F)   Temperature: 36.7 °C (98.1 °F)  Pulse  Av.4  Min: 65  Max: 115    Blood Pressure : 114/50 mmHg      Respiratory      Respiration: 17, Pulse Oximetry: 100 %, O2 Daily Delivery Respiratory : Silicone Nasal Cannula        RUL Breath Sounds: Diminished, RML Breath Sounds: Diminished, RLL Breath Sounds: Diminished, MECCA Breath Sounds: Diminished, LLL Breath Sounds: Diminished    Fluids    Intake/Output Summary (Last 24 hours) at 17 1702  Last data filed at 17 0600   Gross per 24 hour   Intake    750 ml   Output   2200 ml   Net  -1450 ml       Nutrition  Orders Placed This Encounter   Procedures   • Diet Order     Standing Status: Standing      Number of Occurrences: 1      Standing Expiration Date:      Order Specific Question:  Diet:     Answer:  Renal [8]     Physical Exam    Recent Labs      17   1250  17   0324   WBC  6.3  6.1   RBC  3.36*  2.51*   HEMOGLOBIN  10.6*  8.1*   HEMATOCRIT  34.9*  26.3*   MCV  103.9*  104.8*   MCH  31.5  32.3   MCHC  30.4*  30.8*   RDW  62.3*  62.1*   PLATELETCT  152*  129*   MPV  10.8  10.8     Recent Labs      17   1250  17   0324   SODIUM  133*  134*   POTASSIUM  6.3*  4.6   CHLORIDE  93*  96   CO2  33  33   GLUCOSE  63*  84   BUN  36*  20   CREATININE  5.71*  3.65*   CALCIUM  13.0*  11.0*     Recent Labs      17   1250  17   1729  17   0006  17   0829   APTT  27.5  36.9*  125.6*  160.1*   INR  1.05    --    --    --                   Assessment/Plan     ESRD (end stage renal disease) on dialysis (CMS-HCC) (present on admission)  Assessment & Plan  [place], dialysis, nephrology following    History of DVT of lower extremity (present on admission)  Assessment & Plan  Coumadin stopped due to GI bleed, discussed with family, she is likely no longer a good coumadin candidate    PAH (pulmonary artery hypertension) - liekly due to mitral regurgitation but with history of thromboembolic disease (present on admission)  Assessment & Plan  Watch volume, RT/O2  Dialysis to help with volume management    Dementia (present on admission)  Assessment & Plan  Family support    HTN (hypertension) (present on admission)  Assessment & Plan  Coreg, losartan, hydralazine, imdur    GIB (gastrointestinal bleeding) (present on admission)  Assessment & Plan  PPI, off AC    Hyperkalemia  Assessment & Plan  Dialysis today, discussed with Dr. Alma LANDEROS (constipation)  Assessment & Plan  Enema pending    NSTEMI (non-ST elevated myocardial infarction) (CMS-HCC)  Assessment & Plan  Elevating troponin on 7/18. I called and consulted Dr. Napier, Cardiology. Started heparin gtt. Already on isordil, BB. Cannot do ACEI because of renal failure. Consider nitro gtt if hypertension resistant, or symptomatic (currently not sympromatic)    Hypoglycemia  Assessment & Plan  From poor PO intake, no history of diabetes. Resolved; encouraged PO    HLD (hyperlipidemia) (present on admission)  Assessment & Plan  Statin, ?beneficial in dialysis pt?    I spent 41 minutes, reviewing the chart, notes, vitals, labs, imaging, ordering labs, evaluating [facility] for assessment, enacting the plan above. 50% of the time was spent in counseling Skye James and  answering questions. I also spoke with Dr. Napier, cardiology. Medical decision making is therefore complex. Time was devoted to counseling and coordinating care including review of  records, pertinent lab data and studies, as well as discussing diagnostic evaluation and work up, planned therapeutic interventions and future disposition of care. Where indicated, the assessment and plan reflect discussion of patient with consultants, other healthcare providers, family members, and additional research needed to obtain further information in formulating the plan of care for Skye James.     Core Measures   Now on hepa gtt

## 2017-07-21 ENCOUNTER — HOSPITAL ENCOUNTER (OUTPATIENT)
Dept: LAB | Facility: MEDICAL CENTER | Age: 82
End: 2017-07-21
Attending: INTERNAL MEDICINE
Payer: COMMERCIAL

## 2017-07-21 LAB — EKG IMPRESSION: NORMAL

## 2017-08-03 ENCOUNTER — TELEPHONE (OUTPATIENT)
Dept: MEDICAL GROUP | Facility: PHYSICIAN GROUP | Age: 82
End: 2017-08-03

## 2017-08-03 NOTE — TELEPHONE ENCOUNTER
ESTABLISHED PATIENT PRE-VISIT PLANNING     Note: Patient will not be contacted if there is no indication to call.     1.  Reviewed notes from the last few office visits within the medical group: Yes    2.  If any orders were placed at last visit or intended to be done for this visit (i.e. 6 mos follow-up), do we have Results/Consult Notes?        •  Labs - Labs were not ordered at last office visit.       •  Imaging - Imaging was not ordered at last office visit.       •  Referrals - No referrals were ordered at last office visit.    3. Is this appointment scheduled as a Hospital Follow-Up? No    4.  Immunizations were updated in Epic using WebIZ?: Epic matches WebIZ       •  Web Iz Recommendations: FLU and TDAP    5.  Patient is due for the following Health Maintenance Topics:   Health Maintenance Due   Topic Date Due   • Annual Wellness Visit  1935   • IMM DTaP/Tdap/Td Vaccine (1 - Tdap) 06/02/1954   • PAP SMEAR  06/02/1956   • MAMMOGRAM  06/02/1975   • BONE DENSITY  06/02/2000       - Patient has completed PNEUMOVAX (PPSV23), PREVNAR (PCV13)  and ZOSTAVAX (Shingles) Immunization(s) per WebIZ. Chart has been updated.    6.  Patient was NOT informed to arrive 15 min prior to their scheduled appointment and bring in their medication bottles.

## 2017-08-14 NOTE — PROGRESS NOTES
SB-SR [id number]  In and out of accelerated idioventricular for a short time early in shift, pt asymptomatic, SR remainder of shift - strips of both in chart  16/08/38   Facial

## 2019-06-12 NOTE — DISCHARGE PLANNING
Called in to pharmacy voicemail. Pt  notified   Per [facility], pt has been accepted to return to [facility] pending Insurance auth. JAREK faxed transport form as pt is medically cleared to return to SNF.

## 2021-01-13 DIAGNOSIS — Z23 NEED FOR VACCINATION: ICD-10-CM

## 2023-01-24 NOTE — ASSESSMENT & PLAN NOTE
Coreg, losartan, hydralazine, imdur  Follow up attending physician at [facility] facility upon receipt. Defer referral to Pulmonology.   Follow up with Alistair Gandara M.D. In 1-2 weeks   Detail Level: Detailed Quality 226: Preventive Care And Screening: Tobacco Use: Screening And Cessation Intervention: Tobacco Screening not Performed for Unknown Reasons Quality 134: Screening For Clinical Depression And Follow-Up Plan: Depression Screening not documented, reason not given Quality 431: Preventive Care And Screening: Unhealthy Alcohol Use - Screening: Patient not identified as an unhealthy alcohol user when screened for unhealthy alcohol use using a systematic screening method Quality 130: Documentation Of Current Medications In The Medical Record: Current Medications Documented Quality 110: Preventive Care And Screening: Influenza Immunization: Influenza immunization was not ordered or administered, reason not given

## 2023-04-29 NOTE — PROGRESS NOTES
Pt arrived from Dialysis. Pt went from ED to Dialysis. Pt is A&Ox4. Pt complaining of generalized pain. Will look at orders and give all scheduled meds and prn as indicated. Pt placed on bed alarm for safety, pt has non slip socks applied, bed is locked and in lowest position, and belongings are bedside and within reach. Pt educated on call light and verbalized understanding.    Spontaneous, unlabored and symmetrical

## 2025-05-12 NOTE — PROGRESS NOTES
Patient swabbed for influenza ; result returned positive for Influenza Type B called to Samantha MENDEZ; no new orders received; patient placed on droplets precautions.   No

## (undated) DEVICE — SPONGE GAUZE NON-STERILE 4X4 - (2000/CA 10PK/CA)

## (undated) DEVICE — SYRINGE 6 CC 20 GA X 1 1/2 - NDL SAFETY  (50/BX)

## (undated) DEVICE — BASIN EMESIS DISP. - (250/CA)

## (undated) DEVICE — GOWN SURGEONS X-LARGE - DISP. (30/CA)

## (undated) DEVICE — FILM CASSETTE ENDO

## (undated) DEVICE — ELECTRODE 850 FOAM ADHESIVE - HYDROGEL RADIOTRNSPRNT (50/PK)

## (undated) DEVICE — CONTAINER, SPECIMEN, STERILE

## (undated) DEVICE — SNARECAPTIVATOR II - 20MM ROUND STIFF

## (undated) DEVICE — MASK WITH FACE SHIELD (25/BX 4BX/CA)

## (undated) DEVICE — TUBE CONNECTING SUCTION - CLEAR PLASTIC STERILE 72 IN (50EA/CA)

## (undated) DEVICE — CON SEDATION/>5 YR 1ST 15 MIN

## (undated) DEVICE — SYRINGE 3 CC 22 GA X 1-1/2 - NDL SAFETY (50/BX 8BX/CA)

## (undated) DEVICE — KIT CUSTOM PROCEDURE SINGLE FOR ENDO  (15/CA)

## (undated) DEVICE — CANISTER SUCTION RIGID RED 1500CC (40EA/CA)

## (undated) DEVICE — SNARECAPTIVATOR II - 20MM ROUND STIFF (40/BX)

## (undated) DEVICE — SOD. CHL 10CC SYRINGE PREFILL - W/10 CC (30/BX)

## (undated) DEVICE — TRAP POLYP E-TRAP (25EA/BX)

## (undated) DEVICE — CON SEDATION EA ADDL 15 MIN